# Patient Record
Sex: MALE | Race: WHITE | NOT HISPANIC OR LATINO | Employment: PART TIME | URBAN - METROPOLITAN AREA
[De-identification: names, ages, dates, MRNs, and addresses within clinical notes are randomized per-mention and may not be internally consistent; named-entity substitution may affect disease eponyms.]

---

## 2017-01-04 ENCOUNTER — GENERIC CONVERSION - ENCOUNTER (OUTPATIENT)
Dept: OTHER | Facility: OTHER | Age: 42
End: 2017-01-04

## 2017-01-09 ENCOUNTER — APPOINTMENT (OUTPATIENT)
Dept: PHYSICAL THERAPY | Facility: CLINIC | Age: 42
End: 2017-01-09
Payer: COMMERCIAL

## 2017-01-09 PROCEDURE — 97110 THERAPEUTIC EXERCISES: CPT

## 2017-01-09 PROCEDURE — 97162 PT EVAL MOD COMPLEX 30 MIN: CPT

## 2017-01-10 ENCOUNTER — APPOINTMENT (OUTPATIENT)
Dept: PHYSICAL THERAPY | Facility: CLINIC | Age: 42
End: 2017-01-10
Payer: COMMERCIAL

## 2017-01-11 ENCOUNTER — ALLSCRIPTS OFFICE VISIT (OUTPATIENT)
Dept: OTHER | Facility: OTHER | Age: 42
End: 2017-01-11

## 2017-01-11 ENCOUNTER — APPOINTMENT (OUTPATIENT)
Dept: PHYSICAL THERAPY | Facility: CLINIC | Age: 42
End: 2017-01-11
Payer: COMMERCIAL

## 2017-01-11 DIAGNOSIS — G47.00 INSOMNIA: ICD-10-CM

## 2017-01-11 DIAGNOSIS — N52.9 MALE ERECTILE DYSFUNCTION: ICD-10-CM

## 2017-01-11 PROCEDURE — 97110 THERAPEUTIC EXERCISES: CPT

## 2017-01-11 PROCEDURE — 97140 MANUAL THERAPY 1/> REGIONS: CPT

## 2017-01-13 ENCOUNTER — ALLSCRIPTS OFFICE VISIT (OUTPATIENT)
Dept: OTHER | Facility: OTHER | Age: 42
End: 2017-01-13

## 2017-01-17 ENCOUNTER — APPOINTMENT (OUTPATIENT)
Dept: PHYSICAL THERAPY | Facility: CLINIC | Age: 42
End: 2017-01-17
Payer: COMMERCIAL

## 2017-01-19 ENCOUNTER — APPOINTMENT (OUTPATIENT)
Dept: PHYSICAL THERAPY | Facility: CLINIC | Age: 42
End: 2017-01-19
Payer: COMMERCIAL

## 2017-01-23 ENCOUNTER — APPOINTMENT (OUTPATIENT)
Dept: PHYSICAL THERAPY | Facility: CLINIC | Age: 42
End: 2017-01-23
Payer: COMMERCIAL

## 2017-01-23 ENCOUNTER — GENERIC CONVERSION - ENCOUNTER (OUTPATIENT)
Dept: OTHER | Facility: OTHER | Age: 42
End: 2017-01-23

## 2017-01-23 PROCEDURE — 97140 MANUAL THERAPY 1/> REGIONS: CPT

## 2017-01-23 PROCEDURE — 97110 THERAPEUTIC EXERCISES: CPT

## 2017-02-01 ENCOUNTER — APPOINTMENT (OUTPATIENT)
Dept: PHYSICAL THERAPY | Facility: CLINIC | Age: 42
End: 2017-02-01
Payer: COMMERCIAL

## 2017-02-02 ENCOUNTER — APPOINTMENT (OUTPATIENT)
Dept: PHYSICAL THERAPY | Facility: CLINIC | Age: 42
End: 2017-02-02
Payer: COMMERCIAL

## 2017-02-02 PROCEDURE — 97110 THERAPEUTIC EXERCISES: CPT

## 2017-02-08 ENCOUNTER — APPOINTMENT (OUTPATIENT)
Dept: PHYSICAL THERAPY | Facility: CLINIC | Age: 42
End: 2017-02-08
Payer: COMMERCIAL

## 2017-02-09 ENCOUNTER — APPOINTMENT (OUTPATIENT)
Dept: PHYSICAL THERAPY | Facility: CLINIC | Age: 42
End: 2017-02-09
Payer: COMMERCIAL

## 2017-02-10 ENCOUNTER — GENERIC CONVERSION - ENCOUNTER (OUTPATIENT)
Dept: OTHER | Facility: OTHER | Age: 42
End: 2017-02-10

## 2017-02-14 ENCOUNTER — GENERIC CONVERSION - ENCOUNTER (OUTPATIENT)
Dept: OTHER | Facility: OTHER | Age: 42
End: 2017-02-14

## 2017-02-15 ENCOUNTER — APPOINTMENT (OUTPATIENT)
Dept: PHYSICAL THERAPY | Facility: CLINIC | Age: 42
End: 2017-02-15
Payer: COMMERCIAL

## 2017-02-17 ENCOUNTER — HOSPITAL ENCOUNTER (EMERGENCY)
Facility: HOSPITAL | Age: 42
Discharge: HOME HEALTH | End: 2017-02-17
Attending: EMERGENCY MEDICINE | Admitting: EMERGENCY MEDICINE
Payer: COMMERCIAL

## 2017-02-17 VITALS
WEIGHT: 184.08 LBS | OXYGEN SATURATION: 97 % | DIASTOLIC BLOOD PRESSURE: 75 MMHG | BODY MASS INDEX: 27.18 KG/M2 | RESPIRATION RATE: 18 BRPM | TEMPERATURE: 98 F | SYSTOLIC BLOOD PRESSURE: 117 MMHG | HEART RATE: 77 BPM

## 2017-02-17 DIAGNOSIS — A08.4 VIRAL GASTROENTERITIS: ICD-10-CM

## 2017-02-17 DIAGNOSIS — E86.0 DEHYDRATION: ICD-10-CM

## 2017-02-17 DIAGNOSIS — R11.10 ACUTE VOMITING: Primary | ICD-10-CM

## 2017-02-17 DIAGNOSIS — S37.009A KIDNEY INJURY: ICD-10-CM

## 2017-02-17 LAB
ALBUMIN SERPL BCP-MCNC: 4.6 G/DL (ref 3.5–5)
ALP SERPL-CCNC: 87 U/L (ref 46–116)
ALT SERPL W P-5'-P-CCNC: 65 U/L (ref 12–78)
ANION GAP BLD CALC-SCNC: 19 MMOL/L (ref 4–13)
ANION GAP SERPL CALCULATED.3IONS-SCNC: 13 MMOL/L (ref 4–13)
AST SERPL W P-5'-P-CCNC: 35 U/L (ref 5–45)
BASOPHILS # BLD AUTO: 0.01 THOUSANDS/ΜL (ref 0–0.1)
BASOPHILS NFR BLD AUTO: 0 % (ref 0–1)
BILIRUB SERPL-MCNC: 0.9 MG/DL (ref 0.2–1)
BUN BLD-MCNC: 24 MG/DL (ref 5–25)
BUN SERPL-MCNC: 27 MG/DL (ref 5–25)
CA-I BLD-SCNC: 1.14 MMOL/L (ref 1.12–1.32)
CALCIUM SERPL-MCNC: 9 MG/DL (ref 8.3–10.1)
CHLORIDE BLD-SCNC: 103 MMOL/L (ref 100–108)
CHLORIDE SERPL-SCNC: 98 MMOL/L (ref 100–108)
CK MB SERPL-MCNC: 1.7 NG/ML (ref 0–5)
CK MB SERPL-MCNC: <1 % (ref 0–2.5)
CK SERPL-CCNC: 234 U/L (ref 39–308)
CLARITY, POC: CLEAR
CO2 SERPL-SCNC: 24 MMOL/L (ref 21–32)
COLOR, POC: NORMAL
CREAT BLD-MCNC: 0.9 MG/DL (ref 0.6–1.3)
CREAT SERPL-MCNC: 1.34 MG/DL (ref 0.6–1.3)
EOSINOPHIL # BLD AUTO: 0.05 THOUSAND/ΜL (ref 0–0.61)
EOSINOPHIL NFR BLD AUTO: 1 % (ref 0–6)
ERYTHROCYTE [DISTWIDTH] IN BLOOD BY AUTOMATED COUNT: 14.7 % (ref 11.6–15.1)
EXT BILIRUBIN, UA: NEGATIVE
EXT BLOOD URINE: NEGATIVE
EXT GLUCOSE, UA: NEGATIVE
EXT KETONES: NEGATIVE
EXT NITRITE, UA: NEGATIVE
EXT PH, UA: 6
EXT PROTEIN, UA: NORMAL
EXT SPECIFIC GRAVITY, UA: 1.03
EXT UROBILINOGEN: NEGATIVE
GFR SERPL CREATININE-BSD FRML MDRD: 58.7 ML/MIN/1.73SQ M
GFR SERPL CREATININE-BSD FRML MDRD: >60 ML/MIN/1.73SQ M
GLUCOSE SERPL-MCNC: 105 MG/DL (ref 65–140)
GLUCOSE SERPL-MCNC: 110 MG/DL (ref 65–140)
HCT VFR BLD AUTO: 36.5 % (ref 36.5–49.3)
HCT VFR BLD CALC: 32 % (ref 36.5–49.3)
HGB BLD-MCNC: 11.5 G/DL (ref 12–17)
HGB BLDA-MCNC: 10.9 G/DL (ref 12–17)
LIPASE SERPL-CCNC: 161 U/L (ref 73–393)
LYMPHOCYTES # BLD AUTO: 0.66 THOUSANDS/ΜL (ref 0.6–4.47)
LYMPHOCYTES NFR BLD AUTO: 12 % (ref 14–44)
MCH RBC QN AUTO: 24.1 PG (ref 26.8–34.3)
MCHC RBC AUTO-ENTMCNC: 31.5 G/DL (ref 31.4–37.4)
MCV RBC AUTO: 76 FL (ref 82–98)
MONOCYTES # BLD AUTO: 0.8 THOUSAND/ΜL (ref 0.17–1.22)
MONOCYTES NFR BLD AUTO: 14 % (ref 4–12)
NEUTROPHILS # BLD AUTO: 4.05 THOUSANDS/ΜL (ref 1.85–7.62)
NEUTS SEG NFR BLD AUTO: 73 % (ref 43–75)
PCO2 BLD: 21 MMOL/L (ref 21–32)
PLATELET # BLD AUTO: 367 THOUSANDS/UL (ref 149–390)
PMV BLD AUTO: 9.1 FL (ref 8.9–12.7)
POTASSIUM BLD-SCNC: 3.4 MMOL/L (ref 3.5–5.3)
POTASSIUM SERPL-SCNC: 3.6 MMOL/L (ref 3.5–5.3)
PROT SERPL-MCNC: 8.5 G/DL (ref 6.4–8.2)
RBC # BLD AUTO: 4.78 MILLION/UL (ref 3.88–5.62)
SODIUM BLD-SCNC: 139 MMOL/L (ref 136–145)
SODIUM SERPL-SCNC: 135 MMOL/L (ref 136–145)
SPECIMEN SOURCE: ABNORMAL
WBC # BLD AUTO: 5.57 THOUSAND/UL (ref 4.31–10.16)
WBC # BLD EST: NEGATIVE 10*3/UL

## 2017-02-17 PROCEDURE — 36415 COLL VENOUS BLD VENIPUNCTURE: CPT | Performed by: PHYSICIAN ASSISTANT

## 2017-02-17 PROCEDURE — 82550 ASSAY OF CK (CPK): CPT | Performed by: PHYSICIAN ASSISTANT

## 2017-02-17 PROCEDURE — 85025 COMPLETE CBC W/AUTO DIFF WBC: CPT | Performed by: PHYSICIAN ASSISTANT

## 2017-02-17 PROCEDURE — 96361 HYDRATE IV INFUSION ADD-ON: CPT

## 2017-02-17 PROCEDURE — 99283 EMERGENCY DEPT VISIT LOW MDM: CPT

## 2017-02-17 PROCEDURE — 82553 CREATINE MB FRACTION: CPT | Performed by: PHYSICIAN ASSISTANT

## 2017-02-17 PROCEDURE — 81002 URINALYSIS NONAUTO W/O SCOPE: CPT | Performed by: PHYSICIAN ASSISTANT

## 2017-02-17 PROCEDURE — 85014 HEMATOCRIT: CPT

## 2017-02-17 PROCEDURE — 83690 ASSAY OF LIPASE: CPT | Performed by: PHYSICIAN ASSISTANT

## 2017-02-17 PROCEDURE — 96374 THER/PROPH/DIAG INJ IV PUSH: CPT

## 2017-02-17 PROCEDURE — 80053 COMPREHEN METABOLIC PANEL: CPT | Performed by: PHYSICIAN ASSISTANT

## 2017-02-17 PROCEDURE — 80047 BASIC METABLC PNL IONIZED CA: CPT

## 2017-02-17 RX ORDER — DICYCLOMINE HCL 20 MG
20 TABLET ORAL ONCE
Status: COMPLETED | OUTPATIENT
Start: 2017-02-17 | End: 2017-02-17

## 2017-02-17 RX ORDER — ONDANSETRON 4 MG/1
4 TABLET, ORALLY DISINTEGRATING ORAL EVERY 8 HOURS PRN
Qty: 10 TABLET | Refills: 0 | Status: SHIPPED | OUTPATIENT
Start: 2017-02-17 | End: 2017-12-31

## 2017-02-17 RX ORDER — DICYCLOMINE HYDROCHLORIDE 10 MG/1
10 CAPSULE ORAL
Qty: 20 CAPSULE | Refills: 0 | Status: SHIPPED | OUTPATIENT
Start: 2017-02-17 | End: 2017-02-22

## 2017-02-17 RX ORDER — ONDANSETRON 2 MG/ML
4 INJECTION INTRAMUSCULAR; INTRAVENOUS ONCE
Status: COMPLETED | OUTPATIENT
Start: 2017-02-17 | End: 2017-02-17

## 2017-02-17 RX ADMIN — DICYCLOMINE HYDROCHLORIDE 20 MG: 20 TABLET ORAL at 16:32

## 2017-02-17 RX ADMIN — SODIUM CHLORIDE 1000 ML: 0.9 INJECTION, SOLUTION INTRAVENOUS at 13:32

## 2017-02-17 RX ADMIN — SODIUM CHLORIDE 1000 ML: 0.9 INJECTION, SOLUTION INTRAVENOUS at 14:15

## 2017-02-17 RX ADMIN — ONDANSETRON 4 MG: 2 INJECTION INTRAMUSCULAR; INTRAVENOUS at 13:27

## 2017-02-20 ENCOUNTER — ALLSCRIPTS OFFICE VISIT (OUTPATIENT)
Dept: OTHER | Facility: OTHER | Age: 42
End: 2017-02-20

## 2017-02-20 DIAGNOSIS — E87.6 HYPOKALEMIA: ICD-10-CM

## 2017-02-20 DIAGNOSIS — D64.9 ANEMIA: ICD-10-CM

## 2017-02-24 ENCOUNTER — ALLSCRIPTS OFFICE VISIT (OUTPATIENT)
Dept: OTHER | Facility: OTHER | Age: 42
End: 2017-02-24

## 2017-03-30 ENCOUNTER — ALLSCRIPTS OFFICE VISIT (OUTPATIENT)
Dept: OTHER | Facility: OTHER | Age: 42
End: 2017-03-30

## 2017-03-30 DIAGNOSIS — M65.4 RADIAL STYLOID TENOSYNOVITIS: ICD-10-CM

## 2017-03-30 DIAGNOSIS — M77.01 MEDIAL EPICONDYLITIS OF RIGHT ELBOW: ICD-10-CM

## 2017-03-30 DIAGNOSIS — M77.02 MEDIAL EPICONDYLITIS OF LEFT ELBOW: ICD-10-CM

## 2017-03-30 DIAGNOSIS — D50.9 IRON DEFICIENCY ANEMIA: ICD-10-CM

## 2017-04-04 ENCOUNTER — APPOINTMENT (OUTPATIENT)
Dept: OCCUPATIONAL THERAPY | Facility: CLINIC | Age: 42
End: 2017-04-04
Payer: COMMERCIAL

## 2017-04-04 DIAGNOSIS — M65.4 RADIAL STYLOID TENOSYNOVITIS: ICD-10-CM

## 2017-04-04 DIAGNOSIS — M77.02 MEDIAL EPICONDYLITIS OF LEFT ELBOW: ICD-10-CM

## 2017-04-04 DIAGNOSIS — M77.01 MEDIAL EPICONDYLITIS OF RIGHT ELBOW: ICD-10-CM

## 2017-04-04 PROCEDURE — 97166 OT EVAL MOD COMPLEX 45 MIN: CPT

## 2017-04-05 ENCOUNTER — APPOINTMENT (OUTPATIENT)
Dept: OCCUPATIONAL THERAPY | Facility: CLINIC | Age: 42
End: 2017-04-05
Payer: COMMERCIAL

## 2017-04-05 PROCEDURE — 97140 MANUAL THERAPY 1/> REGIONS: CPT

## 2017-04-10 ENCOUNTER — APPOINTMENT (OUTPATIENT)
Dept: OCCUPATIONAL THERAPY | Facility: CLINIC | Age: 42
End: 2017-04-10
Payer: COMMERCIAL

## 2017-04-10 PROCEDURE — 97140 MANUAL THERAPY 1/> REGIONS: CPT

## 2017-04-12 ENCOUNTER — APPOINTMENT (OUTPATIENT)
Dept: OCCUPATIONAL THERAPY | Facility: CLINIC | Age: 42
End: 2017-04-12
Payer: COMMERCIAL

## 2017-04-12 PROCEDURE — 97140 MANUAL THERAPY 1/> REGIONS: CPT

## 2017-04-19 ENCOUNTER — TRANSCRIBE ORDERS (OUTPATIENT)
Dept: LAB | Facility: CLINIC | Age: 42
End: 2017-04-19

## 2017-04-19 ENCOUNTER — APPOINTMENT (OUTPATIENT)
Dept: OCCUPATIONAL THERAPY | Facility: CLINIC | Age: 42
End: 2017-04-19
Payer: COMMERCIAL

## 2017-04-19 ENCOUNTER — APPOINTMENT (OUTPATIENT)
Dept: LAB | Facility: CLINIC | Age: 42
End: 2017-04-19
Payer: COMMERCIAL

## 2017-04-19 DIAGNOSIS — N52.9 IMPOTENCE OF ORGANIC ORIGIN: Primary | ICD-10-CM

## 2017-04-19 DIAGNOSIS — G47.00 INSOMNIA: ICD-10-CM

## 2017-04-19 DIAGNOSIS — D64.9 ANEMIA: ICD-10-CM

## 2017-04-19 DIAGNOSIS — E87.6 HYPOKALEMIA: ICD-10-CM

## 2017-04-19 DIAGNOSIS — N52.9 MALE ERECTILE DYSFUNCTION: ICD-10-CM

## 2017-04-19 DIAGNOSIS — D64.9 ANEMIA, UNSPECIFIED: ICD-10-CM

## 2017-04-19 PROCEDURE — 97140 MANUAL THERAPY 1/> REGIONS: CPT

## 2017-04-20 ENCOUNTER — APPOINTMENT (OUTPATIENT)
Dept: OCCUPATIONAL THERAPY | Facility: CLINIC | Age: 42
End: 2017-04-20
Payer: COMMERCIAL

## 2017-04-20 ENCOUNTER — GENERIC CONVERSION - ENCOUNTER (OUTPATIENT)
Dept: OTHER | Facility: OTHER | Age: 42
End: 2017-04-20

## 2017-04-20 PROCEDURE — 97140 MANUAL THERAPY 1/> REGIONS: CPT

## 2017-04-22 ENCOUNTER — APPOINTMENT (OUTPATIENT)
Dept: LAB | Facility: CLINIC | Age: 42
End: 2017-04-22
Payer: COMMERCIAL

## 2017-04-22 DIAGNOSIS — D64.9 ANEMIA, UNSPECIFIED: ICD-10-CM

## 2017-04-22 DIAGNOSIS — N52.9 IMPOTENCE OF ORGANIC ORIGIN: ICD-10-CM

## 2017-04-22 LAB
ANION GAP SERPL CALCULATED.3IONS-SCNC: 6 MMOL/L (ref 4–13)
BASOPHILS # BLD AUTO: 0.03 THOUSANDS/ΜL (ref 0–0.1)
BASOPHILS NFR BLD AUTO: 1 % (ref 0–1)
BUN SERPL-MCNC: 20 MG/DL (ref 5–25)
CALCIUM SERPL-MCNC: 8.9 MG/DL (ref 8.3–10.1)
CHLORIDE SERPL-SCNC: 102 MMOL/L (ref 100–108)
CHOLEST SERPL-MCNC: 145 MG/DL (ref 50–200)
CO2 SERPL-SCNC: 29 MMOL/L (ref 21–32)
CREAT SERPL-MCNC: 0.81 MG/DL (ref 0.6–1.3)
EOSINOPHIL # BLD AUTO: 0.16 THOUSAND/ΜL (ref 0–0.61)
EOSINOPHIL NFR BLD AUTO: 3 % (ref 0–6)
ERYTHROCYTE [DISTWIDTH] IN BLOOD BY AUTOMATED COUNT: 14.9 % (ref 11.6–15.1)
FERRITIN SERPL-MCNC: 4 NG/ML (ref 8–388)
GFR SERPL CREATININE-BSD FRML MDRD: >60 ML/MIN/1.73SQ M
GLUCOSE P FAST SERPL-MCNC: 93 MG/DL (ref 65–99)
HCT VFR BLD AUTO: 34.4 % (ref 36.5–49.3)
HDLC SERPL-MCNC: 40 MG/DL (ref 40–60)
HGB BLD-MCNC: 10.5 G/DL (ref 12–17)
IRON SERPL-MCNC: 37 UG/DL (ref 65–175)
LDLC SERPL CALC-MCNC: 62 MG/DL (ref 0–100)
LYMPHOCYTES # BLD AUTO: 1.54 THOUSANDS/ΜL (ref 0.6–4.47)
LYMPHOCYTES NFR BLD AUTO: 28 % (ref 14–44)
MAGNESIUM SERPL-MCNC: 1.9 MG/DL (ref 1.6–2.6)
MCH RBC QN AUTO: 21.9 PG (ref 26.8–34.3)
MCHC RBC AUTO-ENTMCNC: 30.5 G/DL (ref 31.4–37.4)
MCV RBC AUTO: 72 FL (ref 82–98)
MONOCYTES # BLD AUTO: 0.73 THOUSAND/ΜL (ref 0.17–1.22)
MONOCYTES NFR BLD AUTO: 13 % (ref 4–12)
NEUTROPHILS # BLD AUTO: 3.1 THOUSANDS/ΜL (ref 1.85–7.62)
NEUTS SEG NFR BLD AUTO: 55 % (ref 43–75)
PLATELET # BLD AUTO: 423 THOUSANDS/UL (ref 149–390)
PMV BLD AUTO: 9.4 FL (ref 8.9–12.7)
POTASSIUM SERPL-SCNC: 4.8 MMOL/L (ref 3.5–5.3)
RBC # BLD AUTO: 4.8 MILLION/UL (ref 3.88–5.62)
SODIUM SERPL-SCNC: 137 MMOL/L (ref 136–145)
TIBC SERPL-MCNC: 540 UG/DL (ref 250–450)
TRIGL SERPL-MCNC: 215 MG/DL
TSH SERPL DL<=0.05 MIU/L-ACNC: 0.55 UIU/ML (ref 0.36–3.74)
WBC # BLD AUTO: 5.56 THOUSAND/UL (ref 4.31–10.16)

## 2017-04-22 PROCEDURE — 82728 ASSAY OF FERRITIN: CPT

## 2017-04-22 PROCEDURE — 80048 BASIC METABOLIC PNL TOTAL CA: CPT

## 2017-04-22 PROCEDURE — 83540 ASSAY OF IRON: CPT

## 2017-04-22 PROCEDURE — 83735 ASSAY OF MAGNESIUM: CPT

## 2017-04-22 PROCEDURE — 83550 IRON BINDING TEST: CPT

## 2017-04-22 PROCEDURE — 84443 ASSAY THYROID STIM HORMONE: CPT

## 2017-04-22 PROCEDURE — 85025 COMPLETE CBC W/AUTO DIFF WBC: CPT

## 2017-04-22 PROCEDURE — 80061 LIPID PANEL: CPT

## 2017-04-22 PROCEDURE — 36415 COLL VENOUS BLD VENIPUNCTURE: CPT

## 2017-04-24 ENCOUNTER — APPOINTMENT (OUTPATIENT)
Dept: OCCUPATIONAL THERAPY | Facility: CLINIC | Age: 42
End: 2017-04-24
Payer: COMMERCIAL

## 2017-04-24 PROCEDURE — 97140 MANUAL THERAPY 1/> REGIONS: CPT

## 2017-04-26 ENCOUNTER — ALLSCRIPTS OFFICE VISIT (OUTPATIENT)
Dept: OTHER | Facility: OTHER | Age: 42
End: 2017-04-26

## 2017-04-26 ENCOUNTER — GENERIC CONVERSION - ENCOUNTER (OUTPATIENT)
Dept: OTHER | Facility: OTHER | Age: 42
End: 2017-04-26

## 2017-04-28 ENCOUNTER — APPOINTMENT (OUTPATIENT)
Dept: OCCUPATIONAL THERAPY | Facility: CLINIC | Age: 42
End: 2017-04-28
Payer: COMMERCIAL

## 2017-04-28 PROCEDURE — 97140 MANUAL THERAPY 1/> REGIONS: CPT

## 2017-05-02 ENCOUNTER — APPOINTMENT (OUTPATIENT)
Dept: OCCUPATIONAL THERAPY | Facility: CLINIC | Age: 42
End: 2017-05-02
Payer: COMMERCIAL

## 2017-05-02 PROCEDURE — 97140 MANUAL THERAPY 1/> REGIONS: CPT

## 2017-05-05 ENCOUNTER — APPOINTMENT (OUTPATIENT)
Dept: OCCUPATIONAL THERAPY | Facility: CLINIC | Age: 42
End: 2017-05-05
Payer: COMMERCIAL

## 2017-05-05 ENCOUNTER — GENERIC CONVERSION - ENCOUNTER (OUTPATIENT)
Dept: OTHER | Facility: OTHER | Age: 42
End: 2017-05-05

## 2017-05-05 PROCEDURE — 97140 MANUAL THERAPY 1/> REGIONS: CPT

## 2017-05-09 ENCOUNTER — APPOINTMENT (OUTPATIENT)
Dept: OCCUPATIONAL THERAPY | Facility: CLINIC | Age: 42
End: 2017-05-09
Payer: COMMERCIAL

## 2017-05-09 PROCEDURE — 97140 MANUAL THERAPY 1/> REGIONS: CPT

## 2017-05-11 ENCOUNTER — ALLSCRIPTS OFFICE VISIT (OUTPATIENT)
Dept: OTHER | Facility: OTHER | Age: 42
End: 2017-05-11

## 2017-06-12 ENCOUNTER — GENERIC CONVERSION - ENCOUNTER (OUTPATIENT)
Dept: OTHER | Facility: OTHER | Age: 42
End: 2017-06-12

## 2017-06-12 ENCOUNTER — APPOINTMENT (OUTPATIENT)
Dept: LAB | Facility: CLINIC | Age: 42
End: 2017-06-12
Payer: COMMERCIAL

## 2017-06-12 ENCOUNTER — TRANSCRIBE ORDERS (OUTPATIENT)
Dept: LAB | Facility: CLINIC | Age: 42
End: 2017-06-12

## 2017-06-12 DIAGNOSIS — D50.9 IRON DEFICIENCY ANEMIA: ICD-10-CM

## 2017-06-12 LAB
BASOPHILS # BLD AUTO: 0.02 THOUSANDS/ΜL (ref 0–0.1)
BASOPHILS NFR BLD AUTO: 0 % (ref 0–1)
EOSINOPHIL # BLD AUTO: 0.21 THOUSAND/ΜL (ref 0–0.61)
EOSINOPHIL NFR BLD AUTO: 3 % (ref 0–6)
ERYTHROCYTE [DISTWIDTH] IN BLOOD BY AUTOMATED COUNT: 20.1 % (ref 11.6–15.1)
FERRITIN SERPL-MCNC: 8 NG/ML (ref 8–388)
HCT VFR BLD AUTO: 37 % (ref 36.5–49.3)
HGB BLD-MCNC: 11.8 G/DL (ref 12–17)
IRON SATN MFR SERPL: 10 %
IRON SERPL-MCNC: 45 UG/DL (ref 65–175)
LYMPHOCYTES # BLD AUTO: 1.67 THOUSANDS/ΜL (ref 0.6–4.47)
LYMPHOCYTES NFR BLD AUTO: 22 % (ref 14–44)
MCH RBC QN AUTO: 23.2 PG (ref 26.8–34.3)
MCHC RBC AUTO-ENTMCNC: 31.9 G/DL (ref 31.4–37.4)
MCV RBC AUTO: 73 FL (ref 82–98)
MONOCYTES # BLD AUTO: 0.72 THOUSAND/ΜL (ref 0.17–1.22)
MONOCYTES NFR BLD AUTO: 10 % (ref 4–12)
NEUTROPHILS # BLD AUTO: 4.99 THOUSANDS/ΜL (ref 1.85–7.62)
NEUTS SEG NFR BLD AUTO: 65 % (ref 43–75)
PLATELET # BLD AUTO: 369 THOUSANDS/UL (ref 149–390)
PMV BLD AUTO: 9.6 FL (ref 8.9–12.7)
RBC # BLD AUTO: 5.08 MILLION/UL (ref 3.88–5.62)
TIBC SERPL-MCNC: 467 UG/DL (ref 250–450)
WBC # BLD AUTO: 7.61 THOUSAND/UL (ref 4.31–10.16)

## 2017-06-12 PROCEDURE — 83540 ASSAY OF IRON: CPT

## 2017-06-12 PROCEDURE — 36415 COLL VENOUS BLD VENIPUNCTURE: CPT

## 2017-06-12 PROCEDURE — 82728 ASSAY OF FERRITIN: CPT

## 2017-06-12 PROCEDURE — 85025 COMPLETE CBC W/AUTO DIFF WBC: CPT

## 2017-06-12 PROCEDURE — 83550 IRON BINDING TEST: CPT

## 2017-06-20 ENCOUNTER — ALLSCRIPTS OFFICE VISIT (OUTPATIENT)
Dept: OTHER | Facility: OTHER | Age: 42
End: 2017-06-20

## 2017-07-05 ENCOUNTER — ALLSCRIPTS OFFICE VISIT (OUTPATIENT)
Dept: OTHER | Facility: OTHER | Age: 42
End: 2017-07-05

## 2017-07-17 ENCOUNTER — ALLSCRIPTS OFFICE VISIT (OUTPATIENT)
Dept: OTHER | Facility: OTHER | Age: 42
End: 2017-07-17

## 2017-08-17 ENCOUNTER — GENERIC CONVERSION - ENCOUNTER (OUTPATIENT)
Dept: OTHER | Facility: OTHER | Age: 42
End: 2017-08-17

## 2017-08-17 ENCOUNTER — APPOINTMENT (OUTPATIENT)
Dept: LAB | Facility: CLINIC | Age: 42
End: 2017-08-17
Payer: COMMERCIAL

## 2017-08-17 ENCOUNTER — TRANSCRIBE ORDERS (OUTPATIENT)
Dept: LAB | Facility: CLINIC | Age: 42
End: 2017-08-17

## 2017-08-17 DIAGNOSIS — D50.9 IRON DEFICIENCY ANEMIA: ICD-10-CM

## 2017-08-17 LAB
BASOPHILS # BLD AUTO: 0.02 THOUSANDS/ΜL (ref 0–0.1)
BASOPHILS NFR BLD AUTO: 0 % (ref 0–1)
EOSINOPHIL # BLD AUTO: 0.07 THOUSAND/ΜL (ref 0–0.61)
EOSINOPHIL NFR BLD AUTO: 1 % (ref 0–6)
ERYTHROCYTE [DISTWIDTH] IN BLOOD BY AUTOMATED COUNT: 17.2 % (ref 11.6–15.1)
FERRITIN SERPL-MCNC: 25 NG/ML (ref 8–388)
HCT VFR BLD AUTO: 42.4 % (ref 36.5–49.3)
HGB BLD-MCNC: 14.3 G/DL (ref 12–17)
IRON SATN MFR SERPL: 23 %
IRON SERPL-MCNC: 99 UG/DL (ref 65–175)
LYMPHOCYTES # BLD AUTO: 1.73 THOUSANDS/ΜL (ref 0.6–4.47)
LYMPHOCYTES NFR BLD AUTO: 25 % (ref 14–44)
MCH RBC QN AUTO: 25.8 PG (ref 26.8–34.3)
MCHC RBC AUTO-ENTMCNC: 33.7 G/DL (ref 31.4–37.4)
MCV RBC AUTO: 77 FL (ref 82–98)
MONOCYTES # BLD AUTO: 0.57 THOUSAND/ΜL (ref 0.17–1.22)
MONOCYTES NFR BLD AUTO: 8 % (ref 4–12)
NEUTROPHILS # BLD AUTO: 4.51 THOUSANDS/ΜL (ref 1.85–7.62)
NEUTS SEG NFR BLD AUTO: 66 % (ref 43–75)
PLATELET # BLD AUTO: 326 THOUSANDS/UL (ref 149–390)
PMV BLD AUTO: 8.9 FL (ref 8.9–12.7)
RBC # BLD AUTO: 5.54 MILLION/UL (ref 3.88–5.62)
TIBC SERPL-MCNC: 438 UG/DL (ref 250–450)
WBC # BLD AUTO: 6.9 THOUSAND/UL (ref 4.31–10.16)

## 2017-08-17 PROCEDURE — 83540 ASSAY OF IRON: CPT

## 2017-08-17 PROCEDURE — 83550 IRON BINDING TEST: CPT

## 2017-08-17 PROCEDURE — 82728 ASSAY OF FERRITIN: CPT

## 2017-08-17 PROCEDURE — 85025 COMPLETE CBC W/AUTO DIFF WBC: CPT

## 2017-08-17 PROCEDURE — 36415 COLL VENOUS BLD VENIPUNCTURE: CPT

## 2017-09-18 ENCOUNTER — ALLSCRIPTS OFFICE VISIT (OUTPATIENT)
Dept: OTHER | Facility: OTHER | Age: 42
End: 2017-09-18

## 2017-10-24 ENCOUNTER — GENERIC CONVERSION - ENCOUNTER (OUTPATIENT)
Dept: OTHER | Facility: OTHER | Age: 42
End: 2017-10-24

## 2017-11-01 ENCOUNTER — TRANSCRIBE ORDERS (OUTPATIENT)
Dept: LAB | Facility: CLINIC | Age: 42
End: 2017-11-01

## 2017-11-01 ENCOUNTER — APPOINTMENT (OUTPATIENT)
Dept: LAB | Facility: CLINIC | Age: 42
End: 2017-11-01
Payer: COMMERCIAL

## 2017-11-01 ENCOUNTER — GENERIC CONVERSION - ENCOUNTER (OUTPATIENT)
Dept: OTHER | Facility: OTHER | Age: 42
End: 2017-11-01

## 2017-11-01 DIAGNOSIS — D50.9 IRON DEFICIENCY ANEMIA: ICD-10-CM

## 2017-11-01 LAB
BASOPHILS # BLD AUTO: 0.01 THOUSANDS/ΜL (ref 0–0.1)
BASOPHILS NFR BLD AUTO: 0 % (ref 0–1)
EOSINOPHIL # BLD AUTO: 0.1 THOUSAND/ΜL (ref 0–0.61)
EOSINOPHIL NFR BLD AUTO: 1 % (ref 0–6)
ERYTHROCYTE [DISTWIDTH] IN BLOOD BY AUTOMATED COUNT: 13.9 % (ref 11.6–15.1)
FERRITIN SERPL-MCNC: 22 NG/ML (ref 8–388)
HCT VFR BLD AUTO: 42.4 % (ref 36.5–49.3)
HGB BLD-MCNC: 14.5 G/DL (ref 12–17)
IRON SATN MFR SERPL: 25 %
IRON SERPL-MCNC: 107 UG/DL (ref 65–175)
LYMPHOCYTES # BLD AUTO: 1.71 THOUSANDS/ΜL (ref 0.6–4.47)
LYMPHOCYTES NFR BLD AUTO: 24 % (ref 14–44)
MCH RBC QN AUTO: 28 PG (ref 26.8–34.3)
MCHC RBC AUTO-ENTMCNC: 34.2 G/DL (ref 31.4–37.4)
MCV RBC AUTO: 82 FL (ref 82–98)
MONOCYTES # BLD AUTO: 0.61 THOUSAND/ΜL (ref 0.17–1.22)
MONOCYTES NFR BLD AUTO: 8 % (ref 4–12)
NEUTROPHILS # BLD AUTO: 4.79 THOUSANDS/ΜL (ref 1.85–7.62)
NEUTS SEG NFR BLD AUTO: 67 % (ref 43–75)
PLATELET # BLD AUTO: 339 THOUSANDS/UL (ref 149–390)
PMV BLD AUTO: 9.3 FL (ref 8.9–12.7)
RBC # BLD AUTO: 5.17 MILLION/UL (ref 3.88–5.62)
TIBC SERPL-MCNC: 426 UG/DL (ref 250–450)
WBC # BLD AUTO: 7.22 THOUSAND/UL (ref 4.31–10.16)

## 2017-11-01 PROCEDURE — 83540 ASSAY OF IRON: CPT

## 2017-11-01 PROCEDURE — 85025 COMPLETE CBC W/AUTO DIFF WBC: CPT

## 2017-11-01 PROCEDURE — 82728 ASSAY OF FERRITIN: CPT

## 2017-11-01 PROCEDURE — 83550 IRON BINDING TEST: CPT

## 2017-11-01 PROCEDURE — 36415 COLL VENOUS BLD VENIPUNCTURE: CPT

## 2017-11-06 ENCOUNTER — ALLSCRIPTS OFFICE VISIT (OUTPATIENT)
Dept: OTHER | Facility: OTHER | Age: 42
End: 2017-11-06

## 2017-11-07 NOTE — CONSULTS
Assessment  1  Restless legs syndrome (333 94) (G25 81)   2  Hypnic jerks (307 49) (F51 8)    Plan  Hypnic jerks, Restless legs syndrome    · Follow-up PRN Evaluation and Treatment  Follow-up  Status: Complete  Done:  [id number]   Ordered; For: Hypnic jerks, Restless legs syndrome; Ordered By: Guido Leonardo Performed:  Due: [id number]  Restless legs syndrome    · Pramipexole Dihydrochloride 0 125 MG Oral Tablet; 1 po qhs, if no improvement  after 4 days can increase to 2 tabs po qhs   Rx By: Guido Leonardo; Dispense: 0 Days ; #:60 Tablet; Refill: 3;For: Restless legs syndrome; DUSTIN = N; Verified Transmission to [facility][id number] Last Updated By: System, [employer]; 11/6/2017 2:47:40 PM    Discussion/Summary  Discussion Summary:   Abnormal legs sensation and discomfort along with leg movements, an urge to move this legs and improvement with movement consistent with restless legs  He also has movement a night so perhaps PLMS but this is not bother some  We spoke about the associated between iron deficiency and RLS  He has supplemented but there have been studies showing keeping higher ferritin levels closer to 100 helping with symptoms  He can try to do this with diet if he no longer wishes to take pills  We spoke of the option of treating the symptoms with a medication versus staying on TylenolPM which helps him sleep  Will try pramipexole 0 125mg 1-2 hours before bed  If no improvement increase to 2 tabs  Will follow up with PCP  If worsens further workup with sleep study can be performed  Counseling Documentation With Imm: The patient was counseled regarding patient and family education,-- impressions,-- risks and benefits of treatment options  Medication SE Review and Pt Understands Tx: Possible side effects of new medications were reviewed with the patient/guardian today  The treatment plan was reviewed with the patient/guardian   The patient/guardian understands and agrees with the treatment plan      Chief Complaint  Chief Complaint Free Text Note Form: Patient present for a neurological consult for RLS  History of Present Illness  HPI: Brandon Leung is 43year old referred for neurological evaluation of restless legs syndrome  He developed shaking of his legs at night and while sleeping  It was bothering him but Tylenol PM has helped  This all started year ago but did not bother him  When exhausted and laying in bed his legs will move and feel uncomfortable  It can keep him up  Once he is asleep he is fine but his significant other tells him he kicks in his sleep as well  He thought it was secondary to vodka consumption but he has not drank in months  Kicks in sleep do not bother him as it does not wake him up  When symptoms are happening while awake moving his legs may help  He just lays there and lets it go until he falls asleep  He denies any numbness or weakness in his legs  No changes in gait  was iron deficient and has supplemented with iron  Last levels normalized  Ferritin 22  Review of Systems  Neurological ROS:   Constitutional: no fever, no chills, no recent weight gain, no recent weight loss, no complaints of feeling tired, no changes in appetite  HEENT:  no sinus problems, not feeling congested, no blurred vision, no dryness of the eyes, no eye pain, no hearing loss, no tinnitus, no mouth sores, no sore throat, no hoarseness, no dysphagia, no masses, no bleeding  Cardiovascular:  no chest pain or pressure, no palpitations present, the heart rate was not rapid or irregular, no swelling in the arms or legs, no poor circulation  Respiratory:  no unusual or persistant cough, no shortness of breath with or without exertion  Gastrointestinal:  no nausea, no vomiting, no diarrhea, no abdominal pain, no changes in bowel habits, no melena, no loss of bowel control     Genitourinary:  no incontinence, no feelings of urinary urgency, no increase in frequency, no urinary hesitancy, no dysuria, no hematuria  Musculoskeletal:  no arthralgias, no myalgias, no immobility or loss of function, no head/neck/back pain, no pain while walking  Integumentary  no masses, no rash, no skin lesions, no livedo reticularis  Psychiatric:  no anxiety, no depression, no mood swings, no psychiatric hospitalizations, no sleep problems  Endocrine  no unusual weight loss or gain, no excessive urination, no excessive thirst, no hair loss or gain, no hot or cold intolerance, no menstrual period change or irregularity, no loss of sexual ability or drive, no erection difficulty, no nipple discharge  Neurological General:  no headache, no nausea or vomiting, no lightheadedness, no convulsions, no blackouts, no syncope, no trauma, no photopsia, no increased sleepiness, no trouble falling asleep, no snoring, no awakening at night  Neurological Mental Status:  no confusion, no mood swings, no alteration or loss of consciousness, no difficulty expressing/understanding speech, no memory problems  Neurological Cranial Nerves:  no blurry or double vision, no loss of vision, no face drooping, no facial numbness or weakness, no taste or smell loss/changes, no hearing loss or ringing, no vertigo or dizziness, no dysphagia, no slurred speech  Neurological Motor findings include:  no tremor, no twitching, no cramping(pre/post exercise), no atrophy  Neurological Sensory:  no numbness, no pain, no tingling, does not fall when eyes closed or taking a shower  Neurological Gait:  no difficulty walking, not falling to one side, no sensation of being pushed, has not had falls  Active Problems  1  Acid reflux (530 81) (K21 9)   2  Anxiety (300 00) (F41 9)   3  Arthralgia, unspecified joint (719 40) (M25 50)   4  Closed fracture of proximal phalanx of left hand (816 01) (S60 693U)   5  De Quervain's tenosynovitis, left (727 04) (M65 4)   6  Encounter for smoking cessation counseling (V65 42,305 1) (Z71 6,Z72 0)   7   Erectile dysfunction (607 84) (N52 9)   8  External hemorrhoids (455 3) (K64 4)   9  Hypnic jerks (307 49) (F51 8)   10  Hypokalemia (276 8) (E87 6)   11  Insomnia (780 52) (G47 00)   12  Iron deficiency anemia (280 9) (D50 9)   13  Lateral epicondylitis of both elbows (726 32) (M77 11,M77 12)   14  Lower back pain (724 2) (M54 5)   15  Medial epicondylitis of left elbow (726 31) (M77 02)   16  Medial epicondylitis of right elbow (726 31) (M77 01)   17  Restless legs syndrome (333 94) (G25 81)   18  Skin tag (701 9) (L91 8)   19  Stress at home (V61 9) (F43 9)   20  Tendinitis of left rotator cuff (726 10) (M75 82)   21  Tendinitis of right rotator cuff (726 10) (M75 81)    Past Medical History  1  History of Acute upper respiratory infection (465 9) (J06 9)   2  History of Umbilical hernia (346 0) (K42 9)  Active Problems And Past Medical History Reviewed: The active problems and past medical history were reviewed and updated today  Surgical History  1  History of Closed Treatment Of Trimalleolar Ankle Fracture   2  History of Hernia Repair   3  History of Umbilical Hernia Repair  Surgical History Reviewed: The surgical history was reviewed and updated today  Family History  Mother    1  Denied: Family history of Alcoholism and drug addiction in family   2  Denied: Family history of Anxiety and depression   3  Denied: Family history of colon cancer   4  Denied: Family history of colonic polyps   5  Denied: Family history of liver disease  Father    10  Denied: Family history of Alcoholism and drug addiction in family   7  Denied: Family history of Anxiety and depression   8  Denied: Family history of colon cancer   9  Denied: Family history of colonic polyps   10  Denied: Family history of liver disease  Child    6  Denied: Family history of Alcoholism and drug addiction in family   15  Denied: Family history of Anxiety and depression  Sibling    15   Denied: Family history of Alcoholism and drug addiction in family   15  Denied: Family history of Anxiety and depression  Family History Reviewed: The family history was reviewed and updated today  Social History   · Alcohol use (V49 89) (Z78 9)   · Current some day smoker (305 1) (F17 200)   · Currently working   · History of marijuana use (305 23) (B45 655)   · Single   · Stress at home (V61 9) (F43 9)  Social History Reviewed: The social history was reviewed and updated today  Current Meds   1  Ibuprofen 800 MG Oral Tablet; TAKE 1 TABLET 3 TIMES DAILY AS NEEDED; Therapy: (Recorded:06Nov2017) to Recorded   2  RABEprazole Sodium 20 MG Oral Tablet Delayed Release; TAKE 1 TABLET DAILY AS   NEEDED FOR HEARTBURN;   Therapy: 11DJN8913 to (Evaluate:17Jan2018)  Requested for: 15Dsl7129; Last   Rx:[id number] Ordered   3  Tylenol PM Extra Strength 500-25 MG Oral Tablet; TAKE 1 TABLET AT BEDTIME AS   NEEDED FOR JOINT PAINS; Therapy: 27ZWV8259 to (Evaluate:17Nov2017)  Requested for: 18Sep2017; Last   Rx:[id number] Ordered  Medication List Reviewed: The medication list was reviewed and updated today  Allergies  1  No Known Drug Allergies    Vitals  Signs   Recorded: 63QBH2681 02:08PM   Heart Rate: 84  Systolic: 503  Diastolic: 68  Height: 5 ft 9 in  Weight: 168 lb   BMI Calculated: 24 81  BSA Calculated: 1 92    Physical Exam    Constitutional   General appearance: No acute distress, well appearing and well nourished  Eyes   Ophthalmoscopic examination: Vision is grossly normal  Gross visual field testing by confrontation shows no abnormalities  EOMI in both eyes  Conjunctivae clear  Eyelids normal palpebral fissures equal  Orbits exhibit normal position  No discharge from the eyes  PERRL  Musculoskeletal   Gait and station: Normal gait, stance and balance  Muscle strength: Normal strength throughout  Muscle tone: No atrophy, abnormal movements, flaccidity, cogwheeling or spasticity      Neurologic   Orientation to person, place, and time: Normal  Recent and remote memory: Demonstrates normal memory  Attention span and concentration: Normal thought process and attention span  Language: Names objects, able to repeat phrases and speaks spontaneously  Fund of knowledge: Normal vocabulary with appropriate knowledge of current events and past history      2nd cranial nerve: Normal     3rd, 4th, and 6th cranial nerves: Normal     5th cranial nerve: Normal     7th cranial nerve: Normal     8th cranial nerve: Normal     9th cranial nerve: Normal     11th cranial nerve: Normal     12th cranial nerve: Normal     Sensation: Normal     Reflexes: Normal     Coordination: Normal        Signatures   Electronically signed by : Valeria Pickering MD; Nov 6 2017  3:03PM EST                       (Author)

## 2017-12-19 ENCOUNTER — ALLSCRIPTS OFFICE VISIT (OUTPATIENT)
Dept: OTHER | Facility: OTHER | Age: 42
End: 2017-12-19

## 2017-12-20 NOTE — PROGRESS NOTES
Assessment  1  Left wrist pain (719 43) (M25 532)    Plan  De Quervain's tenosynovitis, left    · PredniSONE 10 MG (48) Oral Tablet Therapy Pack; use as directed    Discussion/Summary    Left wrist pain, likely overuse syndrome  Prescribed prednisone taper  Continue ibuprofen 800 mg as needed  Follow-up as needed if pain persists or worsens  Chief Complaint  1  Shoulder Pain    History of Present Illness  HPI: Patient returns today with worsening pain in his left wrist  He attributes the pain to increased use during the holiday season at [facility]  He has been taking ibuprofen 800 mg  States that he has not been drinking his usual amount of alcohol which has made the pain harder to deal with  He wears an elastic wrap over his wrist which helps slightly  Active Problems  1  Acid reflux (530 81) (K21 9)   2  Anxiety (300 00) (F41 9)   3  Arthralgia, unspecified joint (719 40) (M25 50)   4  Closed fracture of proximal phalanx of left hand (816 01) (S62 235F)   5  Encounter for smoking cessation counseling (V65 42,305 1) (Z71 6,Z72 0)   6  Erectile dysfunction (607 84) (N52 9)   7  External hemorrhoids (455 3) (K64 4)   8  Hypnic jerks (307 49) (F51 8)   9  Hypokalemia (276 8) (E87 6)   10  Insomnia (780 52) (G47 00)   11  Iron deficiency anemia (280 9) (D50 9)   12  Lateral epicondylitis of both elbows (726 32) (M77 11,M77 12)   13  Left wrist pain (719 43) (M25 532)   14  Lower back pain (724 2) (M54 5)   15  Medial epicondylitis of left elbow (726 31) (M77 02)   16  Medial epicondylitis of right elbow (726 31) (M77 01)   17  Restless legs syndrome (333 94) (G25 81)   18  Skin tag (701 9) (L91 8)   19  Stress at home (V61 9) (F43 9)   20  Tendinitis of left rotator cuff (726 10) (M75 82)   21  Tendinitis of right rotator cuff (726 10) (M75 81)    Current Meds   1  Ibuprofen 800 MG Oral Tablet; TAKE 1 TABLET 3 TIMES DAILY AS NEEDED  Requested for: 33MWW3701; Last Rx:[id number] Ordered   2   Pramipexole Dihydrochloride 0 125 MG Oral Tablet; 1 po qhs, if no improvement after 4 days can increase to 2 tabs po qhs; Therapy: 07DJS1106 to (Last Rx:06Nov2017)  Requested for: 42IYU8336 Ordered   3  RABEprazole Sodium 20 MG Oral Tablet Delayed Release; TAKE 1 TABLET DAILY AS NEEDED FOR HEARTBURN; Therapy: 48KNQ6626 to (Evaluate:17Jan2018)  Requested for: 11Wne8253; Last Rx:94Byq0043 Ordered   4  Tylenol PM Extra Strength 500-25 MG Oral Tablet; TAKE 1 TABLET AT BEDTIME AS NEEDED FOR JOINT PAINS; Therapy: 05JMK1503 to (Evaluate:17Nov2017)  Requested for: 72Qor2174; Last Rx:[id number] Ordered    Allergies  1  No Known Drug Allergies    Vitals  Signs   Heart Rate: 86  Systolic: 473  Diastolic: 81  Height: 5 ft 9 in  Weight: 164 lb   BMI Calculated: 24 22  BSA Calculated: 1 9    Physical Exam  Left wrist:  No soft tissue swelling  Tenderness to palpation diffusely over the dorsal carpus  Full range of motion of the wrist without DRUJ instability  Carpal tunnel compression test is negative  Skin is intact  2+ radial pulse  No gross sensory deficits  Mood and affect are appropriate        Signatures   Electronically signed by : FARRUKH Easley ; Dec 19 2017 12:22PM EST                       (Author)

## 2017-12-31 ENCOUNTER — HOSPITAL ENCOUNTER (EMERGENCY)
Facility: HOSPITAL | Age: 42
Discharge: HOME | End: 2017-12-31
Attending: EMERGENCY MEDICINE | Admitting: EMERGENCY MEDICINE
Payer: COMMERCIAL

## 2017-12-31 ENCOUNTER — APPOINTMENT (EMERGENCY)
Dept: CT IMAGING | Facility: HOSPITAL | Age: 42
End: 2017-12-31
Payer: COMMERCIAL

## 2017-12-31 VITALS
BODY MASS INDEX: 24.04 KG/M2 | RESPIRATION RATE: 16 BRPM | SYSTOLIC BLOOD PRESSURE: 128 MMHG | HEART RATE: 70 BPM | WEIGHT: 162.8 LBS | OXYGEN SATURATION: 100 % | TEMPERATURE: 97.8 F | DIASTOLIC BLOOD PRESSURE: 65 MMHG

## 2017-12-31 DIAGNOSIS — K52.9 GASTROENTERITIS: Primary | ICD-10-CM

## 2017-12-31 LAB
ALBUMIN SERPL BCP-MCNC: 3.8 G/DL (ref 3.5–5)
ALP SERPL-CCNC: 71 U/L (ref 46–116)
ALT SERPL W P-5'-P-CCNC: 61 U/L (ref 12–78)
ANION GAP SERPL CALCULATED.3IONS-SCNC: 9 MMOL/L (ref 4–13)
AST SERPL W P-5'-P-CCNC: 56 U/L (ref 5–45)
BASOPHILS # BLD AUTO: 0.01 THOUSANDS/ΜL (ref 0–0.1)
BASOPHILS NFR BLD AUTO: 0 % (ref 0–1)
BILIRUB SERPL-MCNC: 0.7 MG/DL (ref 0.2–1)
BUN SERPL-MCNC: 15 MG/DL (ref 5–25)
CALCIUM SERPL-MCNC: 9 MG/DL (ref 8.3–10.1)
CHLORIDE SERPL-SCNC: 97 MMOL/L (ref 100–108)
CO2 SERPL-SCNC: 27 MMOL/L (ref 21–32)
CREAT SERPL-MCNC: 0.8 MG/DL (ref 0.6–1.3)
EOSINOPHIL # BLD AUTO: 0.11 THOUSAND/ΜL (ref 0–0.61)
EOSINOPHIL NFR BLD AUTO: 2 % (ref 0–6)
ERYTHROCYTE [DISTWIDTH] IN BLOOD BY AUTOMATED COUNT: 13.3 % (ref 11.6–15.1)
GFR SERPL CREATININE-BSD FRML MDRD: 110 ML/MIN/1.73SQ M
GLUCOSE SERPL-MCNC: 97 MG/DL (ref 65–140)
HCT VFR BLD AUTO: 44.3 % (ref 36.5–49.3)
HGB BLD-MCNC: 15.1 G/DL (ref 12–17)
LIPASE SERPL-CCNC: 98 U/L (ref 73–393)
LYMPHOCYTES # BLD AUTO: 1.01 THOUSANDS/ΜL (ref 0.6–4.47)
LYMPHOCYTES NFR BLD AUTO: 15 % (ref 14–44)
MCH RBC QN AUTO: 27.7 PG (ref 26.8–34.3)
MCHC RBC AUTO-ENTMCNC: 34.1 G/DL (ref 31.4–37.4)
MCV RBC AUTO: 81 FL (ref 82–98)
MONOCYTES # BLD AUTO: 0.98 THOUSAND/ΜL (ref 0.17–1.22)
MONOCYTES NFR BLD AUTO: 15 % (ref 4–12)
NEUTROPHILS # BLD AUTO: 4.46 THOUSANDS/ΜL (ref 1.85–7.62)
NEUTS SEG NFR BLD AUTO: 68 % (ref 43–75)
PLATELET # BLD AUTO: 291 THOUSANDS/UL (ref 149–390)
PMV BLD AUTO: 9.1 FL (ref 8.9–12.7)
POTASSIUM SERPL-SCNC: 6.1 MMOL/L (ref 3.5–5.3)
PROT SERPL-MCNC: 8.2 G/DL (ref 6.4–8.2)
RBC # BLD AUTO: 5.45 MILLION/UL (ref 3.88–5.62)
SODIUM SERPL-SCNC: 133 MMOL/L (ref 136–145)
WBC # BLD AUTO: 6.57 THOUSAND/UL (ref 4.31–10.16)

## 2017-12-31 PROCEDURE — 96361 HYDRATE IV INFUSION ADD-ON: CPT

## 2017-12-31 PROCEDURE — 74177 CT ABD & PELVIS W/CONTRAST: CPT

## 2017-12-31 PROCEDURE — 96375 TX/PRO/DX INJ NEW DRUG ADDON: CPT

## 2017-12-31 PROCEDURE — 83690 ASSAY OF LIPASE: CPT | Performed by: EMERGENCY MEDICINE

## 2017-12-31 PROCEDURE — 80053 COMPREHEN METABOLIC PANEL: CPT | Performed by: EMERGENCY MEDICINE

## 2017-12-31 PROCEDURE — 96374 THER/PROPH/DIAG INJ IV PUSH: CPT

## 2017-12-31 PROCEDURE — 85025 COMPLETE CBC W/AUTO DIFF WBC: CPT | Performed by: EMERGENCY MEDICINE

## 2017-12-31 PROCEDURE — 99284 EMERGENCY DEPT VISIT MOD MDM: CPT

## 2017-12-31 PROCEDURE — 36415 COLL VENOUS BLD VENIPUNCTURE: CPT | Performed by: EMERGENCY MEDICINE

## 2017-12-31 RX ORDER — DICYCLOMINE HCL 20 MG
20 TABLET ORAL 2 TIMES DAILY
Qty: 20 TABLET | Refills: 0 | Status: SHIPPED | OUTPATIENT
Start: 2017-12-31 | End: 2018-08-16

## 2017-12-31 RX ORDER — DICYCLOMINE HCL 20 MG
20 TABLET ORAL ONCE
Status: COMPLETED | OUTPATIENT
Start: 2017-12-31 | End: 2017-12-31

## 2017-12-31 RX ORDER — ONDANSETRON 4 MG/1
4 TABLET, ORALLY DISINTEGRATING ORAL EVERY 8 HOURS PRN
Qty: 10 TABLET | Refills: 0 | Status: SHIPPED | OUTPATIENT
Start: 2017-12-31 | End: 2018-08-16

## 2017-12-31 RX ORDER — DICYCLOMINE HCL 20 MG
20 TABLET ORAL 3 TIMES DAILY PRN
Qty: 20 TABLET | Refills: 0 | Status: SHIPPED | OUTPATIENT
Start: 2017-12-31 | End: 2018-08-16

## 2017-12-31 RX ORDER — ONDANSETRON 2 MG/ML
4 INJECTION INTRAMUSCULAR; INTRAVENOUS ONCE
Status: COMPLETED | OUTPATIENT
Start: 2017-12-31 | End: 2017-12-31

## 2017-12-31 RX ORDER — ONDANSETRON 4 MG/1
4 TABLET, ORALLY DISINTEGRATING ORAL EVERY 8 HOURS PRN
Qty: 20 TABLET | Refills: 0 | Status: SHIPPED | OUTPATIENT
Start: 2017-12-31 | End: 2018-08-16

## 2017-12-31 RX ORDER — KETOROLAC TROMETHAMINE 30 MG/ML
15 INJECTION, SOLUTION INTRAMUSCULAR; INTRAVENOUS ONCE
Status: COMPLETED | OUTPATIENT
Start: 2017-12-31 | End: 2017-12-31

## 2017-12-31 RX ADMIN — SODIUM CHLORIDE 1000 ML: 0.9 INJECTION, SOLUTION INTRAVENOUS at 17:15

## 2017-12-31 RX ADMIN — KETOROLAC TROMETHAMINE 15 MG: 30 INJECTION, SOLUTION INTRAMUSCULAR at 17:12

## 2017-12-31 RX ADMIN — IODIXANOL 100 ML: 320 INJECTION, SOLUTION INTRAVASCULAR at 17:53

## 2017-12-31 RX ADMIN — ONDANSETRON 4 MG: 2 INJECTION INTRAMUSCULAR; INTRAVENOUS at 17:13

## 2017-12-31 RX ADMIN — DICYCLOMINE HYDROCHLORIDE 20 MG: 20 TABLET ORAL at 17:12

## 2017-12-31 NOTE — ED PROVIDER NOTES
History  Chief Complaint   Patient presents with    Abdominal Pain     Pt presents to ED from home w/ abd pain for 3 days  Pt (+) diarrhea 15x/daily  Pt (-) N/V   Pt (-) health hx      3 days of n/v/d        History provided by:  Patient   used: No    Diarrhea   Quality:  Explosive  Severity:  Severe  Onset quality:  Sudden  Duration:  3 days  Timing:  Constant  Progression:  Worsening  Ineffective treatments:  None tried  Associated symptoms: abdominal pain and vomiting    Associated symptoms: no arthralgias and no headaches    Abdominal pain:     Location:  LLQ    Quality: gnawing      Severity:  Moderate    Onset quality:  Gradual    Duration:  3 days    Timing:  Intermittent    Progression:  Waxing and waning    Chronicity:  New  Vomiting:     Severity:  Mild    Duration:  3 days    Timing:  Intermittent    Progression:  Partially resolved  Risk factors: no recent antibiotic use and no travel to endemic areas        Prior to Admission Medications   Prescriptions Last Dose Informant Patient Reported? Taking?   dicyclomine (BENTYL) 10 mg capsule   No No   Sig: Take 1 capsule by mouth 4 (four) times a day (before meals and at bedtime) for 5 days   ondansetron (ZOFRAN-ODT) 4 mg disintegrating tablet   No No   Sig: Take 1 tablet by mouth every 8 (eight) hours as needed for nausea or vomiting for up to 7 days   pantoprazole (PROTONIX) 20 mg tablet   Yes No   Sig: Take 20 mg by mouth daily      Facility-Administered Medications: None       Past Medical History:   Diagnosis Date    GERD (gastroesophageal reflux disease)        Past Surgical History:   Procedure Laterality Date    PA REPAIR UMBILICAL HAVW,7+S/M,IXSVN N/A 2/1/2016    Procedure: UMBILICAL HERNIA REPAIR ;  Surgeon: Cesar Herndon DO;  Location: [facility];  Service: General       History reviewed  No pertinent family history  I have reviewed and agree with the history as documented      Social History   Substance Use Topics    Smoking status: Current Every Day Smoker     Packs/day: 0 50     Types: Cigarettes    Smokeless tobacco: Never Used    Alcohol use No        Review of Systems   Constitutional: Negative for activity change and appetite change  HENT: Negative for congestion and facial swelling  Eyes: Negative for discharge and redness  Respiratory: Negative for cough and wheezing  Cardiovascular: Negative for chest pain and leg swelling  Gastrointestinal: Positive for abdominal pain, diarrhea and vomiting  Negative for abdominal distention and blood in stool  Endocrine: Negative for cold intolerance and polydipsia  Genitourinary: Negative for difficulty urinating and hematuria  Musculoskeletal: Negative for arthralgias and gait problem  Skin: Negative for color change and rash  Allergic/Immunologic: Negative for food allergies and immunocompromised state  Neurological: Negative for dizziness, seizures and headaches  Hematological: Negative for adenopathy  Does not bruise/bleed easily  Psychiatric/Behavioral: Negative for agitation, confusion and decreased concentration  All other systems reviewed and are negative  Physical Exam  ED Triage Vitals [12/31/17 1611]   Temperature Pulse Respirations Blood Pressure SpO2   97 8 °F (36 6 °C) 73 16 129/67 100 %      Temp Source Heart Rate Source Patient Position - Orthostatic VS BP Location FiO2 (%)   Oral Monitor Sitting Left arm --      Pain Score       7           Orthostatic Vital Signs  Vitals:    12/31/17 1611   BP: 129/67   Pulse: 73   Patient Position - Orthostatic VS: Sitting       Physical Exam   Constitutional: He is oriented to person, place, and time  He appears well-developed and well-nourished  Non-toxic appearance  HENT:   Head: Normocephalic and atraumatic     Right Ear: Tympanic membrane normal    Left Ear: Tympanic membrane normal    Nose: Nose normal    Mouth/Throat: Oropharynx is clear and moist    Eyes: Conjunctivae, EOM and lids are normal  Pupils are equal, round, and reactive to light  Neck: Trachea normal and normal range of motion  Neck supple  No JVD present  Carotid bruit is not present  Cardiovascular: Normal rate, regular rhythm, normal heart sounds and intact distal pulses  No extrasystoles are present  Pulmonary/Chest: Effort normal  He has no decreased breath sounds  He has no wheezes  He has no rhonchi  He has no rales  He exhibits no tenderness and no deformity  Abdominal: Soft  Bowel sounds are normal  There is tenderness in the left lower quadrant  There is no rebound, no guarding and no CVA tenderness  Musculoskeletal:        Right shoulder: He exhibits normal range of motion, no tenderness, no swelling and no deformity  Cervical back: Normal  He exhibits normal range of motion, no tenderness, no bony tenderness and no deformity  Lymphadenopathy:     He has no cervical adenopathy  He has no axillary adenopathy  Neurological: He is alert and oriented to person, place, and time  He has normal strength and normal reflexes  No cranial nerve deficit or sensory deficit  He displays a negative Romberg sign  Skin: Skin is warm and dry  Psychiatric: He has a normal mood and affect  His speech is normal and behavior is normal  Judgment and thought content normal  Cognition and memory are normal    Nursing note and vitals reviewed        ED Medications  Medications   ondansetron (ZOFRAN) injection 4 mg (4 mg Intravenous Given 12/31/17 1713)   ketorolac (TORADOL) injection 15 mg (15 mg Intravenous Given 12/31/17 1712)   sodium chloride 0 9 % bolus 1,000 mL (1,000 mL Intravenous New Bag 12/31/17 1715)   dicyclomine (BENTYL) tablet 20 mg (20 mg Oral Given 12/31/17 1712)   iodixanol (VISIPAQUE) 320 MG/ML injection 100 mL (100 mL Intravenous Given 12/31/17 1753)       Diagnostic Studies  Results Reviewed     Procedure Component Value Units Date/Time    CMP [[id number]]  (Abnormal) Collected:  12/31/17 1702    Lab Status:  Final result Specimen:  Blood from Arm, Left Updated:  12/31/17 1743     Sodium 133 (L) mmol/L      Potassium 6 1 (H) mmol/L      Chloride 97 (L) mmol/L      CO2 27 mmol/L      Anion Gap 9 mmol/L      BUN 15 mg/dL      Creatinine 0 80 mg/dL      Glucose 97 mg/dL      Calcium 9 0 mg/dL      AST 56 (H) U/L      ALT 61 U/L      Alkaline Phosphatase 71 U/L      Total Protein 8 2 g/dL      Albumin 3 8 g/dL      Total Bilirubin 0 70 mg/dL      eGFR 110 ml/min/1 73sq m     Narrative:         National Kidney Disease Education Program recommendations are as follows:  GFR calculation is accurate only with a steady state creatinine  Chronic Kidney disease less than 60 ml/min/1 73 sq  meters  Kidney failure less than 15 ml/min/1 73 sq  meters  Lipase [[id number]]  (Normal) Collected:  12/31/17 1702    Lab Status:  Final result Specimen:  Blood from Arm, Left Updated:  12/31/17 1732     Lipase 98 u/L     CBC and differential [[id number]]  (Abnormal) Collected:  12/31/17 1702    Lab Status:  Final result Specimen:  Blood from Arm, Left Updated:  12/31/17 1715     WBC 6 57 Thousand/uL      RBC 5 45 Million/uL      Hemoglobin 15 1 g/dL      Hematocrit 44 3 %      MCV 81 (L) fL      MCH 27 7 pg      MCHC 34 1 g/dL      RDW 13 3 %      MPV 9 1 fL      Platelets 137 Thousands/uL      Neutrophils Relative 68 %      Lymphocytes Relative 15 %      Monocytes Relative 15 (H) %      Eosinophils Relative 2 %      Basophils Relative 0 %      Neutrophils Absolute 4 46 Thousands/µL      Lymphocytes Absolute 1 01 Thousands/µL      Monocytes Absolute 0 98 Thousand/µL      Eosinophils Absolute 0 11 Thousand/µL      Basophils Absolute 0 01 Thousands/µL                  CT abdomen pelvis with contrast   Final Result by Binu Barragan MD (12/31 1811)      No acute abnormality within the abdomen or pelvis           Workstation performed: [id number]                    Procedures  Procedures       Phone Contacts  ED Phone Contact    ED Course  ED Course                                MDM  Number of Diagnoses or Management Options  Gastroenteritis: new and requires workup     Amount and/or Complexity of Data Reviewed  Clinical lab tests: ordered and reviewed  Tests in the radiology section of CPT®: ordered and reviewed  Tests in the medicine section of CPT®: ordered and reviewed    Patient Progress  Patient progress: improved    CritCare Time    Disposition  Final diagnoses:   Gastroenteritis     Time reflects when diagnosis was documented in both MDM as applicable and the Disposition within this note     Time User Action Codes Description Comment    12/31/2017  6:27 PM Willy Tavarez [K52 9] Gastroenteritis       ED Disposition     ED Disposition Condition Comment    Discharge  Luda Napier discharge to home/self care  Condition at discharge: Good        Follow-up Information     Follow up With Specialties Details Why Gia 124, DO Internal Medicine Schedule an appointment as soon as possible for a visit  [address] [place] [address],[address]  [address] [place] [address]  [phone]          Patient's Medications   Discharge Prescriptions    DICYCLOMINE (BENTYL) 20 MG TABLET    Take 1 tablet by mouth 3 (three) times a day as needed (crampy abd pain)       Start Date: 12/31/2017End Date: --       Order Dose: 20 mg       Quantity: 20 tablet    Refills: 0    DICYCLOMINE (BENTYL) 20 MG TABLET    Take 1 tablet by mouth 2 (two) times a day       Start Date: 12/31/2017End Date: --       Order Dose: 20 mg       Quantity: 20 tablet    Refills: 0    ONDANSETRON (ZOFRAN-ODT) 4 MG DISINTEGRATING TABLET    Take 1 tablet by mouth every 8 (eight) hours as needed for nausea or vomiting for up to 7 days       Start Date: 12/31/2017End Date: 1/7/2018       Order Dose: 4 mg       Quantity: 20 tablet    Refills: 0     No discharge procedures on file      ED Provider  Electronically Signed by           Kimberly Ramos DO  12/31/17 9041

## 2017-12-31 NOTE — DISCHARGE INSTRUCTIONS
Enteritis   WHAT YOU NEED TO KNOW:   Enteritis is inflammation of the small intestine  It may be caused by eating foods or drinking liquids contaminated with a virus, bacteria, or parasites  It may also be caused by certain medicines, damage from radiation, and medical conditions such as Crohn disease  DISCHARGE INSTRUCTIONS:   Return to the emergency department if:   · You cannot stop vomiting  · You have not urinated for 12 hours  Contact your healthcare provider if:   · You have a fever over 101 5  · You have blood or mucus in your bowel movements  · You continue to vomit or have diarrhea for more than 3 days, even after treatment  · You have a dry mouth and eyes, you are urinating less than usual, and you feel dizzy when you stand up  · Your mouth or eyes are dry  You are not urinating as much or as often  · You are losing weight without trying  · You have questions or concerns about your condition or care  Medicines:   · Medicines  may be given to fight an infection caused by bacteria or a parasite  You may also need medicines to slow or stop your diarrhea or vomiting  Do not take these medicines unless your healthcare provider say it is okay  Other medicines may be needed to treat medical conditions that are causing enteritis  · Take your medicine as directed  Contact your healthcare provider if you think your medicine is not helping or if you have side effects  Tell him of her if you are allergic to any medicine  Keep a list of the medicines, vitamins, and herbs you take  Include the amounts, and when and why you take them  Bring the list or the pill bottles to follow-up visits  Carry your medicine list with you in case of an emergency  Manage enteritis:   · Eat foods that help to decrease symptoms  Limit or avoid foods and liquids that are high in sugar, fat, and fiber to help relieve diarrhea  It may be helpful to avoid lactose   Lactose is a type of sugar that is found in milk products  You may be able to tolerate soups, broths, well-cooked vegetables, canned fruit, and baked or broiled meats  Ask your dietitian or healthcare provider if you should follow a special diet  You may need to avoid other foods if you have certain medical conditions such as celiac disease  · Drink liquids as directed  Ask how much liquid to drink each day and which liquids are best for you  It is important to prevent or treat dehydration  Even if you have been vomiting, suck on ice chips or take small sips of clear liquids often  Slowly increase the amount of clear liquids you drink  If you become dehydrated, you may need IV liquids  · Drink an oral rehydration solution (ORS) as directed  An ORS contains water, salts, and sugar that are needed to replace lost body fluids  Ask what kind of ORS to use, how much to drink, and where to get it  Prevent enteritis:  Enteritis that is caused by bacteria, parasites, or viruses can be prevented  The following may help to prevent this type of enteritis:  · Wash your hands often  Use soap and water  Wash your hands after you use the bathroom, change a child's diapers, or sneeze  Wash your hands before you prepare or eat food  · Clean surfaces and do laundry often  Wash your clothes and towels separately from the rest of the laundry  Clean surfaces in your home with antibacterial  or bleach  · Clean food thoroughly and cook safely  Wash raw vegetables before you cook  Cook meat, fish, and eggs fully  Do not use the same dishes for raw meat as you do for other foods  Refrigerate any leftover food immediately  · Be aware when you camp or travel  Drink only clean water  Do not drink from rivers or lakes unless you purify or boil the water first  When you travel, drink bottled water and do not add ice  Do not eat fruit that has not been peeled  Do not eat raw fish or meat that is not fully cooked    Follow up with your healthcare provider as directed:  Write down your questions so you remember to ask them during your visits  © 2017 [employer] Information is for End User's use only and may not be sold, redistributed or otherwise used for commercial purposes  All illustrations and images included in CareNotes® are the copyrighted property of [employer]  or Yoni Villa  The above information is an  only  It is not intended as medical advice for individual conditions or treatments  Talk to your doctor, nurse or pharmacist before following any medical regimen to see if it is safe and effective for you

## 2018-01-09 NOTE — RESULT NOTES
Verified Results  (1) CBC/PLT/DIFF [id number] 11:02AM [facility]: [id number]     Test Name Result Flag Reference   WBC COUNT 6 90 Thousand/uL  4 31-10 16   RBC COUNT 5 54 Million/uL  3 88-5 62   HEMOGLOBIN 14 3 g/dL  12 0-17 0   HEMATOCRIT 42 4 %  36 5-49 3   MCV 77 fL L 82-98   MCH 25 8 pg L 26 8-34 3   MCHC 33 7 g/dL  31 4-37 4   RDW 17 2 % H 11 6-15 1   MPV 8 9 fL  8 9-12 7   PLATELET COUNT 546 Thousands/uL  149-390   NEUTROPHILS RELATIVE PERCENT 66 %  43-75   LYMPHOCYTES RELATIVE PERCENT 25 %  14-44   MONOCYTES RELATIVE PERCENT 8 %  4-12   EOSINOPHILS RELATIVE PERCENT 1 %  0-6   BASOPHILS RELATIVE PERCENT 0 %  0-1   NEUTROPHILS ABSOLUTE COUNT 4 51 Thousands/? ??L  1 85-7 62   LYMPHOCYTES ABSOLUTE COUNT 1 73 Thousands/? ??L  0 60-4 47   MONOCYTES ABSOLUTE COUNT 0 57 Thousand/? ??L  0 17-1 22   EOSINOPHILS ABSOLUTE COUNT 0 07 Thousand/? ??L  0 00-0 61   BASOPHILS ABSOLUTE COUNT 0 02 Thousands/? ??L  0 00-0 10     (1) IRON SATURATION %, TIBC [id number] 11:02AM Alba Hamzah   [facility] Order Number: [id number]     Test Name Result Flag Reference   IRON SATURATION 23 %     TOTAL IRON BINDING CAPACITY 438 ug/dL  250-450   IRON 99 ug/dL     Patients treated with metal-binding drugs (ie  Deferoxamine) may have depressed iron values       (1) FERRITIN [id number] 11:02AM Alba Hamzah    Order Number: [id number]     Test Name Result Flag Reference   FERRITIN 25 ng/mL  5-175

## 2018-01-11 NOTE — RESULT NOTES
Verified Results  (1) CBC/PLT/DIFF [id number] 09:52AM Cheryl Cantu    Order Number: [id number]     Test Name Result Flag Reference   WBC COUNT 7 22 Thousand/uL  4 31-10 16   RBC COUNT 5 17 Million/uL  3 88-5 62   HEMOGLOBIN 14 5 g/dL  12 0-17 0   HEMATOCRIT 42 4 %  36 5-49 3   MCV 82 fL  82-98   MCH 28 0 pg  26 8-34 3   MCHC 34 2 g/dL  31 4-37 4   RDW 13 9 %  11 6-15 1   MPV 9 3 fL  8 9-12 7   PLATELET COUNT 169 Thousands/uL  149-390   NEUTROPHILS RELATIVE PERCENT 67 %  43-75   LYMPHOCYTES RELATIVE PERCENT 24 %  14-44   MONOCYTES RELATIVE PERCENT 8 %  4-12   EOSINOPHILS RELATIVE PERCENT 1 %  0-6   BASOPHILS RELATIVE PERCENT 0 %  0-1   NEUTROPHILS ABSOLUTE COUNT 4 79 Thousands/? ??L  1 85-7 62   LYMPHOCYTES ABSOLUTE COUNT 1 71 Thousands/? ??L  0 60-4 47   MONOCYTES ABSOLUTE COUNT 0 61 Thousand/? ??L  0 17-1 22   EOSINOPHILS ABSOLUTE COUNT 0 10 Thousand/? ??L  0 00-0 61   BASOPHILS ABSOLUTE COUNT 0 01 Thousands/? ??L  0 00-0 10     (1) IRON SATURATION %, TIBC [id number] 09:52AM Cheryl Cantu    Order Number: [id number]     Test Name Result Flag Reference   IRON SATURATION 25 %     TOTAL IRON BINDING CAPACITY 426 ug/dL  250-450   IRON 107 ug/dL     Patients treated with metal-binding drugs (ie  Deferoxamine) may have depressed iron values       (1) FERRITIN [id number] 09:52AM Cheryl Cantu    Order Number: [id number]     Test Name Result Flag Reference   FERRITIN 22 ng/mL  2-784

## 2018-01-12 VITALS
BODY MASS INDEX: 26.66 KG/M2 | HEIGHT: 69 IN | WEIGHT: 180 LBS | DIASTOLIC BLOOD PRESSURE: 81 MMHG | HEART RATE: 88 BPM | SYSTOLIC BLOOD PRESSURE: 121 MMHG

## 2018-01-12 VITALS
BODY MASS INDEX: 26.66 KG/M2 | WEIGHT: 180 LBS | HEART RATE: 105 BPM | DIASTOLIC BLOOD PRESSURE: 71 MMHG | HEIGHT: 69 IN | SYSTOLIC BLOOD PRESSURE: 110 MMHG

## 2018-01-12 NOTE — RESULT NOTES
Verified Results  (1) BASIC METABOLIC PROFILE [id number] 11:25AM Kimberley Lev Order Number: [id number]     Test Name Result Flag Reference   SODIUM 137 mmol/L  136-145   POTASSIUM 4 8 mmol/L  3 5-5 3   CHLORIDE 102 mmol/L  100-108   CARBON DIOXIDE 29 mmol/L  21-32   ANION GAP (CALC) 6 mmol/L  4-13   BLOOD UREA NITROGEN 20 mg/dL  5-25   CREATININE 0 81 mg/dL  0 60-1 30   Standardized to IDMS reference method   CALCIUM 8 9 mg/dL  8 3-10 1   eGFR Non-African American      >60 0 ml/min/1 73sq m   [facility] Disease Education Program recommendations are as follows:  GFR calculation is accurate only with a steady state creatinine  Chronic Kidney disease less than 60 ml/min/1 73 sq  meters  Kidney failure less than 15 ml/min/1 73 sq  meters  GLUCOSE FASTING 93 mg/dL  65-99     (1) TSH WITH FT4 REFLEX 22Apr2017 11:25AM [employer] Order Number: [id number]     Test Name Result Flag Reference   TSH 0 554 uIU/mL  0 358-3 740   Patients undergoing fluorescein dye angiography may retain small amounts of fluorescein in the body for 48-72 hours post procedure  Samples containing fluorescein can produce falsely depressed TSH values  If the patient had this procedure,a specimen should be resubmitted post fluorescein clearance  (1) LIPID PANEL FASTING W DIRECT LDL REFLEX 22Apr2017 11:25AM [employer] Order Number: [id number]     Test Name Result Flag Reference   CHOLESTEROL 145 mg/dL     LDL CHOLESTEROL CALCULATED 62 mg/dL  0-100   - Patient Instructions:  This is a fasting blood test  Water, black tea or black coffee only after 9:00pm the night before test   Drink 2 glasses of water the morning of test       Triglyceride:         Normal              <150 mg/dl       Borderline High    150-199 mg/dl       High               200-499 mg/dl       Very High          >499 mg/dl  Cholesterol:         Desirable        <200 mg/dl      Borderline High  200-239 mg/dl      High >239 mg/dl  HDL Cholesterol:        High    >59 mg/dL      Low     <41 mg/dL  LDL Cholesterol:        Optimal          <100 mg/dl        Near Optimal     100-129 mg/dl        Above Optimal          Borderline High   130-159 mg/dl          High              160-189 mg/dl          Very High        >189 mg/dl  LDL CALCULATED:    This screening LDL is a calculated result  It does not have the accuracy of the Direct Measured LDL in the monitoring of patients with hyperlipidemia and/or statin therapy  Direct Measure LDL (RRE019) must be ordered separately in these patients  TRIGLYCERIDES 215 mg/dL H <=150   Specimen collection should occur prior to N-Acetylcysteine or Metamizole administration due to the potential for falsely depressed results  HDL,DIRECT 40 mg/dL  40-60   Specimen collection should occur prior to Metamizole administration due to the potential for falsely depressed results

## 2018-01-13 VITALS
HEIGHT: 69 IN | RESPIRATION RATE: 18 BRPM | HEART RATE: 90 BPM | SYSTOLIC BLOOD PRESSURE: 100 MMHG | DIASTOLIC BLOOD PRESSURE: 70 MMHG | TEMPERATURE: 98 F | WEIGHT: 172.5 LBS | BODY MASS INDEX: 25.55 KG/M2 | OXYGEN SATURATION: 98 %

## 2018-01-13 VITALS
HEIGHT: 69 IN | WEIGHT: 186.25 LBS | BODY MASS INDEX: 27.59 KG/M2 | HEART RATE: 84 BPM | SYSTOLIC BLOOD PRESSURE: 101 MMHG | DIASTOLIC BLOOD PRESSURE: 69 MMHG

## 2018-01-13 VITALS
SYSTOLIC BLOOD PRESSURE: 120 MMHG | OXYGEN SATURATION: 98 % | HEIGHT: 69 IN | BODY MASS INDEX: 27.59 KG/M2 | HEART RATE: 96 BPM | WEIGHT: 186.25 LBS | RESPIRATION RATE: 18 BRPM | DIASTOLIC BLOOD PRESSURE: 82 MMHG

## 2018-01-13 VITALS
HEIGHT: 69 IN | DIASTOLIC BLOOD PRESSURE: 68 MMHG | BODY MASS INDEX: 24.88 KG/M2 | HEART RATE: 84 BPM | WEIGHT: 168 LBS | SYSTOLIC BLOOD PRESSURE: 141 MMHG

## 2018-01-13 NOTE — RESULT NOTES
Message   shoulder x-ray results are back  there is no arthritis or bony abnormalities in shoulder  Recommend to continue with current treatment plan as discussed today, thanks     Verified Results  * XR SHOULDER 2+ VIEW RIGHT [id number] 02:02PM Kimberley Barcenashand Order Number: [id number]     Test Name Result Flag Reference   XR SHOULDER 2+ VW RIGHT (Report)     RIGHT SHOULDER     INDICATION: Chronic generalized shoulder pain  COMPARISON: None     VIEWS: 3; 3 images     FINDINGS:     There is no acute fracture or dislocation  No degenerative changes  No lytic or blastic lesions are seen  Soft tissues are unremarkable  IMPRESSION:     No acute osseous abnormality         Workstation performed: [id number]     Signed by:   Va Kurtz MD   8/15/16

## 2018-01-14 VITALS
BODY MASS INDEX: 26.66 KG/M2 | HEART RATE: 75 BPM | SYSTOLIC BLOOD PRESSURE: 145 MMHG | WEIGHT: 180 LBS | DIASTOLIC BLOOD PRESSURE: 91 MMHG | HEIGHT: 69 IN

## 2018-01-14 VITALS
BODY MASS INDEX: 25.09 KG/M2 | OXYGEN SATURATION: 98 % | SYSTOLIC BLOOD PRESSURE: 122 MMHG | DIASTOLIC BLOOD PRESSURE: 68 MMHG | HEIGHT: 69 IN | WEIGHT: 169.38 LBS | RESPIRATION RATE: 18 BRPM | HEART RATE: 74 BPM

## 2018-01-14 VITALS
WEIGHT: 182.13 LBS | RESPIRATION RATE: 18 BRPM | BODY MASS INDEX: 26.97 KG/M2 | HEART RATE: 78 BPM | SYSTOLIC BLOOD PRESSURE: 134 MMHG | DIASTOLIC BLOOD PRESSURE: 76 MMHG | HEIGHT: 69 IN | OXYGEN SATURATION: 96 %

## 2018-01-14 VITALS
OXYGEN SATURATION: 99 % | HEART RATE: 80 BPM | BODY MASS INDEX: 26.36 KG/M2 | SYSTOLIC BLOOD PRESSURE: 122 MMHG | HEIGHT: 69 IN | WEIGHT: 178 LBS | RESPIRATION RATE: 18 BRPM | TEMPERATURE: 98 F | DIASTOLIC BLOOD PRESSURE: 88 MMHG

## 2018-01-14 VITALS
HEIGHT: 69 IN | BODY MASS INDEX: 25.03 KG/M2 | SYSTOLIC BLOOD PRESSURE: 112 MMHG | TEMPERATURE: 96.8 F | HEART RATE: 74 BPM | WEIGHT: 169 LBS | RESPIRATION RATE: 18 BRPM | OXYGEN SATURATION: 99 % | DIASTOLIC BLOOD PRESSURE: 72 MMHG

## 2018-01-14 VITALS
TEMPERATURE: 96.7 F | DIASTOLIC BLOOD PRESSURE: 62 MMHG | OXYGEN SATURATION: 99 % | BODY MASS INDEX: 25.4 KG/M2 | WEIGHT: 172 LBS | HEART RATE: 68 BPM | SYSTOLIC BLOOD PRESSURE: 116 MMHG

## 2018-01-14 NOTE — MISCELLANEOUS
Dear to whom it may concern    Lyn Hampton is requesting a lower bunk bed due to his history of ankle fracture in past which required surgery with placement of hardware      [address] [address]  Internal Medicine      Electronically signed by:Alireza Gary DO  Apr 20 2017 12:06PM EST Author

## 2018-01-15 NOTE — MISCELLANEOUS
Message   Recorded as Task   Date: 02/14/2017 04:03 PM, Created By: Jessica Pineda   Task Name: Med Renewal Request   Assigned To: Alireza Rodriguez   Regarding Patient: Rod Nye, Status: Active   Comment:    Jessica Pineda - 14 Feb 2017 4:03 PM     TASK CREATED  Please update Trazodone to 90-day supply per insurance  Phone: [phone]; please call patient once completed          Plan  Anxiety, Insomnia    · TraZODone HCl - 50 MG Oral Tablet; take 1 tablet at bedtime as needed for  sleep    Signatures   Electronically signed by : Pippa Alcala DO; Feb 14 2017  5:34PM EST                       (Author)

## 2018-01-16 NOTE — MISCELLANEOUS
Message   Recorded as Task   Date: 10/24/2017 03:09 PM, Created By: Micha Bardales   Task Name: Follow Up   Assigned To: Alireza Rodriguez   Regarding Patient: Yue Talamantes, Status: In Progress   Comment:    Jayla Hoover - 24 Oct 2017 3:09 PM     TASK CREATED  Caller: [facility]; General Medical Question; [phone]  [facility] pharmacy called  the rabeprazole is on back order and unavailable at this time  please call the pharmacy at  to make a change   Alireza Rodriguez - 24 Oct 2017 3:44 PM     TASK REPLIED TO: Previously Assigned To [employer],Team  I can order pantoprazole, does pharmacy have this rx?    if yes & I order rx, can pharmacy notify Alee Tamayo about medication change?     thanks   Ramon Ureña - 24 Oct 2017 4:31 PM     TASK IN PROGRESS   Ramon Ureña - 24 Oct 2017 4:38 PM     TASK REPLIED TO: Previously Assigned To SL JOSUE,Team  spoke to [facility] pharm who states yes they have pantoprazole and will be able to notify pt of this change        Plan  Acid reflux    · Pantoprazole Sodium 20 MG Oral Tablet Delayed Release; TAKE 1 TABLET DAILY  OR AS NEEDED FOR HEARTBURN - 30 MINUTES BEFORE MEAL SUCH AS  BREAKFAST    Signatures   Electronically signed by Rome Delacruz DO; Oct 24 2017  5:02PM EST                       (Author)

## 2018-01-17 NOTE — MISCELLANEOUS
Message   Recorded as Task   Date: 01/04/2017 08:40 AM, Created By: Iván Leiva   Task Name: Med Renewal Request   Assigned To: Alireza Rodriguez   Regarding Patient: Pati Schlatter, Status: Active   Comment:    Sylvie Kelly - 04 Jan 2017 8:40 AM     TASK CREATED  Caller: Self; Renew Medication; [phone] (Home)  PT  LM W/ ANSWERING SERVICE THAT HE RECENTLY MOVED AND LOST HIS HEARTBURN MED  SEND REFILL TO [facility]          Plan  Acid reflux    · RABEprazole Sodium 20 MG Oral Tablet Delayed Release; TAKE 1 TABLET  DAILY AS NEEDED FOR HEARTBURN    Signatures   Electronically signed by : Lizabeth Najjar, DO; Jan 4 2017 10:31AM EST                       (Author)

## 2018-01-17 NOTE — MISCELLANEOUS
Message   Recorded as Task   Date: 02/10/2017 02:53 PM, Created By: Rodrigue Garcia   Task Name: Follow Up   Assigned To: Alireza Rodriguez   Regarding Patient: Shaquille Phan, Status: Active   Comment:    Jayla Hoover - 10 Feb 2017 2:53 PM     TASK CREATED  Caller: Self; General Medical Question; [phone] (Home)  PTS LEG SHAKES ALOT AT NIGHT  WORSE WHEN TIRED  PLEASE CALL PT  [phone] TO ADVISE  HE IS ASKING ABOUT MEDICATION FOR IT   PHARMACY IS [facility], [place]    called and spoke to patient    poor historian, his description of sx changes during conversation  c/o legs moving when lying in bed watching TV and when using smartphone/mobile devices  has no trouble sleeping and no leg movement while asleep  no leg swelling or leg pains  pt reports it does not bother him but bothers his GF, later reports it does bother him(?)    worried it could be from drinking too much etoh  stretches his leg and feels relief  looked up sx in internet and found it could be a sign of neurologic disorder    hx of anxiety in past, declines to treat except with prn BZD  no other sleep sx or complaints    plan - advised if his symptoms occur while lying in bed watching TV and/or while on smartphone & do not bother him while he is asleep and he sleeps okay, this is not c/w RLS   discussed sleep hygeine   advised to cut down on etoh intake, drink plenty of water, strech his legs daily and exercise regularly   also advised to ask his GF if he has repeated/continuous leg movements during his sleep(does not sound like this is the case) & let me know   if not improved, t/c sleep eval vs neuro eval      Signatures   Electronically signed by : Michael Hernandez DO; Feb 10 2017  5:17PM EST                       (Author)

## 2018-01-23 VITALS
DIASTOLIC BLOOD PRESSURE: 81 MMHG | HEIGHT: 69 IN | BODY MASS INDEX: 24.29 KG/M2 | SYSTOLIC BLOOD PRESSURE: 123 MMHG | WEIGHT: 164 LBS | HEART RATE: 86 BPM

## 2018-02-15 DIAGNOSIS — G25.81 RESTLESS LEG SYNDROME: Primary | ICD-10-CM

## 2018-02-15 RX ORDER — PRAMIPEXOLE DIHYDROCHLORIDE 0.12 MG/1
TABLET ORAL
Qty: 60 TABLET | Refills: 3 | Status: SHIPPED | OUTPATIENT
Start: 2018-02-15 | End: 2018-08-16

## 2018-02-15 NOTE — TELEPHONE ENCOUNTER
Will refill this time but pt should be informed that further refills should be from PCP as he was only seen once and was to follow up prn

## 2018-02-15 NOTE — TELEPHONE ENCOUNTER
I called and spoke to pt and advised him that his medication was refilled but that future refills should come from his PCP  He is aware and aggreable and asked that I forward this message to his PCP to be aware for the future

## 2018-04-10 ENCOUNTER — TELEPHONE (OUTPATIENT)
Dept: OBGYN CLINIC | Facility: HOSPITAL | Age: 43
End: 2018-04-10

## 2018-04-10 DIAGNOSIS — M25.511 ACUTE PAIN OF BOTH SHOULDERS: Primary | ICD-10-CM

## 2018-04-10 DIAGNOSIS — M25.512 ACUTE PAIN OF BOTH SHOULDERS: Primary | ICD-10-CM

## 2018-04-10 RX ORDER — IBUPROFEN 800 MG/1
800 TABLET ORAL EVERY 8 HOURS PRN
Qty: 60 TABLET | Refills: 0 | Status: SHIPPED | OUTPATIENT
Start: 2018-04-10 | End: 2018-05-02 | Stop reason: SDUPTHER

## 2018-04-10 NOTE — TELEPHONE ENCOUNTER
Caller: patient  Call back number: [phone]  Patient's doctor: Dr Patrick Bee    Patient called stating he needs a refill of ibuprofen 800mg  Patient has not been seen since 2/24/17  Patient is aware he has not been seen for a year but he does not want to schedule   Please advise

## 2018-05-02 DIAGNOSIS — M25.512 ACUTE PAIN OF BOTH SHOULDERS: ICD-10-CM

## 2018-05-02 DIAGNOSIS — M25.511 ACUTE PAIN OF BOTH SHOULDERS: ICD-10-CM

## 2018-05-02 RX ORDER — IBUPROFEN 800 MG/1
800 TABLET ORAL EVERY 8 HOURS PRN
Qty: 60 TABLET | Refills: 0 | Status: SHIPPED | OUTPATIENT
Start: 2018-05-02 | End: 2019-09-23 | Stop reason: SDUPTHER

## 2018-05-30 ENCOUNTER — TELEPHONE (OUTPATIENT)
Dept: INTERNAL MEDICINE CLINIC | Facility: CLINIC | Age: 43
End: 2018-05-30

## 2018-05-30 DIAGNOSIS — K21.9 GASTROESOPHAGEAL REFLUX DISEASE, ESOPHAGITIS PRESENCE NOT SPECIFIED: Primary | ICD-10-CM

## 2018-05-30 DIAGNOSIS — M25.50 MULTIPLE JOINT PAIN: ICD-10-CM

## 2018-05-30 RX ORDER — ACETAMINOPHEN,DIPHENHYDRAMINE HCL 500; 25 MG/1; MG/1
TABLET, FILM COATED ORAL
COMMUNITY
Start: 2017-04-26 | End: 2018-05-30 | Stop reason: SDUPTHER

## 2018-05-30 RX ORDER — ACETAMINOPHEN,DIPHENHYDRAMINE HCL 500; 25 MG/1; MG/1
1 TABLET, FILM COATED ORAL DAILY
Qty: 30 TABLET | Refills: 0 | Status: SHIPPED | OUTPATIENT
Start: 2018-05-30 | End: 2018-08-16

## 2018-05-30 RX ORDER — RABEPRAZOLE SODIUM 20 MG/1
TABLET, DELAYED RELEASE ORAL
COMMUNITY
Start: 2015-12-28 | End: 2018-05-30 | Stop reason: SDUPTHER

## 2018-05-30 RX ORDER — RABEPRAZOLE SODIUM 20 MG/1
20 TABLET, DELAYED RELEASE ORAL DAILY
Qty: 90 TABLET | Refills: 0 | Status: SHIPPED | OUTPATIENT
Start: 2018-05-30 | End: 2018-08-16 | Stop reason: SDUPTHER

## 2018-05-30 NOTE — TELEPHONE ENCOUNTER
[facility] on  Rabeprazole SOD DR 20 mg tAB, QTY 90, TAKE ONE DAILY AS NEEDED FOR HEARTBURN  AND  [facility] ASPIRIN PM CAPLET, QTY 30, TAKE 1 TABLET AT BEDTIME AS NEEDED FOR JOINT PAINS  TO [facility] ON [address]

## 2018-06-18 ENCOUNTER — APPOINTMENT (EMERGENCY)
Dept: CT IMAGING | Facility: HOSPITAL | Age: 43
End: 2018-06-18
Payer: COMMERCIAL

## 2018-06-18 ENCOUNTER — HOSPITAL ENCOUNTER (EMERGENCY)
Facility: HOSPITAL | Age: 43
Discharge: HOME | End: 2018-06-19
Attending: EMERGENCY MEDICINE
Payer: COMMERCIAL

## 2018-06-18 DIAGNOSIS — R79.89 ELEVATED LFTS: ICD-10-CM

## 2018-06-18 DIAGNOSIS — K52.9 GASTROENTERITIS: Primary | ICD-10-CM

## 2018-06-18 LAB
ALBUMIN SERPL BCP-MCNC: 4.8 G/DL (ref 3.5–5)
ALP SERPL-CCNC: 118 U/L (ref 46–116)
ALT SERPL W P-5'-P-CCNC: 60 U/L (ref 12–78)
ANION GAP SERPL CALCULATED.3IONS-SCNC: 13 MMOL/L (ref 4–13)
AST SERPL W P-5'-P-CCNC: 31 U/L (ref 5–45)
BASOPHILS # BLD AUTO: 0.02 THOUSANDS/ΜL (ref 0–0.1)
BASOPHILS NFR BLD AUTO: 0 % (ref 0–1)
BILIRUB SERPL-MCNC: 1.6 MG/DL (ref 0.2–1)
BUN SERPL-MCNC: 19 MG/DL (ref 5–25)
CALCIUM SERPL-MCNC: 9.6 MG/DL (ref 8.3–10.1)
CHLORIDE SERPL-SCNC: 99 MMOL/L (ref 100–108)
CO2 SERPL-SCNC: 24 MMOL/L (ref 21–32)
CREAT SERPL-MCNC: 1.06 MG/DL (ref 0.6–1.3)
EOSINOPHIL # BLD AUTO: 0.01 THOUSAND/ΜL (ref 0–0.61)
EOSINOPHIL NFR BLD AUTO: 0 % (ref 0–6)
ERYTHROCYTE [DISTWIDTH] IN BLOOD BY AUTOMATED COUNT: 13.5 % (ref 11.6–15.1)
GFR SERPL CREATININE-BSD FRML MDRD: 86 ML/MIN/1.73SQ M
GLUCOSE SERPL-MCNC: 119 MG/DL (ref 65–140)
HCT VFR BLD AUTO: 47.7 % (ref 36.5–49.3)
HGB BLD-MCNC: 16.5 G/DL (ref 12–17)
LIPASE SERPL-CCNC: 112 U/L (ref 73–393)
LYMPHOCYTES # BLD AUTO: 0.86 THOUSANDS/ΜL (ref 0.6–4.47)
LYMPHOCYTES NFR BLD AUTO: 6 % (ref 14–44)
MCH RBC QN AUTO: 27.9 PG (ref 26.8–34.3)
MCHC RBC AUTO-ENTMCNC: 34.6 G/DL (ref 31.4–37.4)
MCV RBC AUTO: 81 FL (ref 82–98)
MONOCYTES # BLD AUTO: 0.66 THOUSAND/ΜL (ref 0.17–1.22)
MONOCYTES NFR BLD AUTO: 5 % (ref 4–12)
NEUTROPHILS # BLD AUTO: 12.8 THOUSANDS/ΜL (ref 1.85–7.62)
NEUTS SEG NFR BLD AUTO: 89 % (ref 43–75)
PLATELET # BLD AUTO: 366 THOUSANDS/UL (ref 149–390)
PMV BLD AUTO: 8.8 FL (ref 8.9–12.7)
POTASSIUM SERPL-SCNC: 4.1 MMOL/L (ref 3.5–5.3)
PROT SERPL-MCNC: 9.1 G/DL (ref 6.4–8.2)
RBC # BLD AUTO: 5.91 MILLION/UL (ref 3.88–5.62)
SODIUM SERPL-SCNC: 136 MMOL/L (ref 136–145)
WBC # BLD AUTO: 14.35 THOUSAND/UL (ref 4.31–10.16)

## 2018-06-18 PROCEDURE — 83690 ASSAY OF LIPASE: CPT | Performed by: EMERGENCY MEDICINE

## 2018-06-18 PROCEDURE — 96361 HYDRATE IV INFUSION ADD-ON: CPT

## 2018-06-18 PROCEDURE — 85025 COMPLETE CBC W/AUTO DIFF WBC: CPT | Performed by: EMERGENCY MEDICINE

## 2018-06-18 PROCEDURE — 80053 COMPREHEN METABOLIC PANEL: CPT | Performed by: EMERGENCY MEDICINE

## 2018-06-18 PROCEDURE — 96374 THER/PROPH/DIAG INJ IV PUSH: CPT

## 2018-06-18 PROCEDURE — 36415 COLL VENOUS BLD VENIPUNCTURE: CPT | Performed by: EMERGENCY MEDICINE

## 2018-06-18 RX ORDER — ONDANSETRON 2 MG/ML
4 INJECTION INTRAMUSCULAR; INTRAVENOUS ONCE
Status: COMPLETED | OUTPATIENT
Start: 2018-06-18 | End: 2018-06-18

## 2018-06-18 RX ADMIN — SODIUM CHLORIDE 1000 ML: 0.9 INJECTION, SOLUTION INTRAVENOUS at 23:12

## 2018-06-18 RX ADMIN — SODIUM CHLORIDE 1000 ML: 0.9 INJECTION, SOLUTION INTRAVENOUS at 22:23

## 2018-06-18 RX ADMIN — ONDANSETRON 4 MG: 2 INJECTION INTRAMUSCULAR; INTRAVENOUS at 22:25

## 2018-06-19 ENCOUNTER — OFFICE VISIT (OUTPATIENT)
Dept: INTERNAL MEDICINE CLINIC | Facility: CLINIC | Age: 43
End: 2018-06-19
Payer: COMMERCIAL

## 2018-06-19 VITALS
SYSTOLIC BLOOD PRESSURE: 121 MMHG | OXYGEN SATURATION: 97 % | TEMPERATURE: 98.3 F | DIASTOLIC BLOOD PRESSURE: 80 MMHG | HEART RATE: 74 BPM | RESPIRATION RATE: 14 BRPM

## 2018-06-19 VITALS
DIASTOLIC BLOOD PRESSURE: 72 MMHG | SYSTOLIC BLOOD PRESSURE: 104 MMHG | WEIGHT: 173 LBS | RESPIRATION RATE: 16 BRPM | HEIGHT: 69 IN | OXYGEN SATURATION: 98 % | HEART RATE: 74 BPM | BODY MASS INDEX: 25.62 KG/M2

## 2018-06-19 DIAGNOSIS — K21.9 GASTROESOPHAGEAL REFLUX DISEASE, ESOPHAGITIS PRESENCE NOT SPECIFIED: Primary | ICD-10-CM

## 2018-06-19 DIAGNOSIS — R74.8 ELEVATED LIVER ENZYMES: ICD-10-CM

## 2018-06-19 DIAGNOSIS — K52.9 GASTROENTERITIS: ICD-10-CM

## 2018-06-19 PROBLEM — N52.9 ERECTILE DYSFUNCTION: Status: ACTIVE | Noted: 2017-01-11

## 2018-06-19 PROBLEM — G25.81 RESTLESS LEGS SYNDROME: Status: ACTIVE | Noted: 2017-02-20

## 2018-06-19 PROCEDURE — 99284 EMERGENCY DEPT VISIT MOD MDM: CPT

## 2018-06-19 PROCEDURE — 99213 OFFICE O/P EST LOW 20 MIN: CPT | Performed by: INTERNAL MEDICINE

## 2018-06-19 RX ORDER — ONDANSETRON 4 MG/1
4 TABLET, ORALLY DISINTEGRATING ORAL EVERY 8 HOURS PRN
Qty: 10 TABLET | Refills: 0 | Status: SHIPPED | OUTPATIENT
Start: 2018-06-19 | End: 2019-03-11

## 2018-06-19 NOTE — ED PROVIDER NOTES
History  Chief Complaint   Patient presents with    Diarrhea     PT reports "not feeling well yesterday, N/V/D, I cant hold anything down  Took immodium about an hour ago"     45-year-old male presents chief complaint of diffuse crampy abdominal pain associated with nausea, vomiting and diarrhea  Onset was yesterday shortly after eating some shrimp and rice  Patient states that his wife ate similar food and did not suffer any symptoms  No fevers or chills  Patient reports he had similar symptoms in December of last year and was good to [facility] after 2 bags of normal saline  Patient appears in mild distress but does not appear toxic  History provided by:  Patient and medical records   used: No    Diarrhea   Quality:  Copious  Severity:  Moderate  Onset quality:  Sudden  Number of episodes:  Multiple  Duration:  2 days  Timing:  Constant  Progression:  Unchanged  Relieved by:  Nothing  Worsened by:  Nothing  Ineffective treatments:  None tried  Associated symptoms: abdominal pain (diffuse crampy) and vomiting    Associated symptoms: no chills, no diaphoresis and no fever    Vomiting   Associated symptoms: abdominal pain (diffuse crampy) and diarrhea    Associated symptoms: no chills and no fever        Prior to Admission Medications   Prescriptions Last Dose Informant Patient Reported? Taking?    RABEprazole (ACIPHEX) 20 MG tablet   No Yes   Sig: Take 1 tablet (20 mg total) by mouth daily   dicyclomine (BENTYL) 20 mg tablet   No Yes   Sig: Take 1 tablet by mouth 3 (three) times a day as needed (crampy abd pain)   dicyclomine (BENTYL) 20 mg tablet   No Yes   Sig: Take 1 tablet by mouth 2 (two) times a day   diphenhydrAMINE-acetaminophen (TYLENOL PM EXTRA STRENGTH)  MG TABS   No Yes   Sig: Take 1 tablet by mouth daily   ibuprofen (MOTRIN) 800 mg tablet   No Yes   Sig: TAKE 1 TABLET (800 MG TOTAL) BY MOUTH EVERY 8 (EIGHT) HOURS AS NEEDED FOR MILD PAIN   ondansetron (ZOFRAN-ODT) 4 mg disintegrating tablet   No No   Sig: Take 1 tablet by mouth every 8 (eight) hours as needed for nausea or vomiting for up to 7 days   ondansetron (ZOFRAN-ODT) 4 mg disintegrating tablet   No No   Sig: Take 1 tablet by mouth every 8 (eight) hours as needed for nausea or vomiting for up to 7 days   pramipexole (MIRAPEX) 0 125 mg tablet   No Yes   Sig: Take 2 tabs at bedtime      Facility-Administered Medications: None       Past Medical History:   Diagnosis Date    GERD (gastroesophageal reflux disease)     Umbilical hernia     last assessed 12/28/15       Past Surgical History:   Procedure Laterality Date    ANKLE SURGERY      closed treatment of trimalleolar ankle fracture, last assessed 12/28/15    IA REPAIR UMBILICAL RWKB,1+V/M,YSZIM N/A 2/1/2016    Procedure: UMBILICAL HERNIA REPAIR ;  repaired with mesh Surgeon: Lina Keys DO;  Location: [facility];  Service: General       History reviewed  No pertinent family history  I have reviewed and agree with the history as documented  Social History   Substance Use Topics    Smoking status: Current Every Day Smoker     Packs/day: 0 50     Types: Cigarettes    Smokeless tobacco: Never Used      Comment: current some day per [employer]    Alcohol use No      Comment: drinks of friday and saturday several drinks each night        Review of Systems   Constitutional: Negative for chills, diaphoresis and fever  Respiratory: Negative for shortness of breath  Cardiovascular: Negative for chest pain and palpitations  Gastrointestinal: Positive for abdominal pain (diffuse crampy), diarrhea, nausea and vomiting  Negative for constipation  Genitourinary: Negative for dysuria and frequency  Skin: Negative for rash  All other systems reviewed and are negative  Physical Exam  Physical Exam   Constitutional: He is oriented to person, place, and time  He appears well-developed and well-nourished  He appears distressed (mild)     HENT:   Head: Normocephalic and atraumatic  Eyes: EOM are normal  Pupils are equal, round, and reactive to light  Neck: Normal range of motion  No JVD present  Cardiovascular: Normal rate, regular rhythm, normal heart sounds and intact distal pulses  Exam reveals no gallop and no friction rub  No murmur heard  Pulmonary/Chest: Effort normal and breath sounds normal  No respiratory distress  He has no wheezes  He has no rales  He exhibits no tenderness  Abdominal: He exhibits no distension  There is no tenderness  There is no rebound and no guarding  Musculoskeletal: Normal range of motion  He exhibits no tenderness  Neurological: He is alert and oriented to person, place, and time  Skin: Skin is warm and dry  Psychiatric: He has a normal mood and affect  His behavior is normal  Judgment and thought content normal    Nursing note and vitals reviewed        Vital Signs  ED Triage Vitals [06/18/18 2040]   Temperature Pulse Respirations Blood Pressure SpO2   98 3 °F (36 8 °C) 86 16 141/71 97 %      Temp Source Heart Rate Source Patient Position - Orthostatic VS BP Location FiO2 (%)   Oral Monitor Lying Left arm --      Pain Score       --           Vitals:    06/18/18 2230 06/18/18 2300 06/18/18 2330 06/19/18 0000   BP: 145/90 132/88 125/82 121/80   Pulse: 70 66 66 74   Patient Position - Orthostatic VS: Lying Lying Lying Lying       Visual Acuity      ED Medications  Medications   sodium chloride 0 9 % bolus 1,000 mL (1,000 mL Intravenous New Bag 6/18/18 2312)   sodium chloride 0 9 % bolus 1,000 mL (0 mL Intravenous Stopped 6/18/18 2312)   ondansetron (ZOFRAN) injection 4 mg (4 mg Intravenous Given 6/18/18 2225)       Diagnostic Studies  Results Reviewed     Procedure Component Value Units Date/Time    Comprehensive metabolic panel [[id number]]  (Abnormal) Collected:  06/18/18 2222    Lab Status:  Final result Specimen:  Blood from Arm, Left Updated:  06/18/18 2247     Sodium 136 mmol/L      Potassium 4 1 mmol/L Chloride 99 (L) mmol/L      CO2 24 mmol/L      Anion Gap 13 mmol/L      BUN 19 mg/dL      Creatinine 1 06 mg/dL      Glucose 119 mg/dL      Calcium 9 6 mg/dL      AST 31 U/L      ALT 60 U/L      Alkaline Phosphatase 118 (H) U/L      Total Protein 9 1 (H) g/dL      Albumin 4 8 g/dL      Total Bilirubin 1 60 (H) mg/dL      eGFR 86 ml/min/1 73sq m     Narrative:         National Kidney Disease Education Program recommendations are as follows:  GFR calculation is accurate only with a steady state creatinine  Chronic Kidney disease less than 60 ml/min/1 73 sq  meters  Kidney failure less than 15 ml/min/1 73 sq  meters      Lipase [[id number]]  (Normal) Collected:  06/18/18 2222    Lab Status:  Final result Specimen:  Blood from Arm, Left Updated:  06/18/18 2247     Lipase 112 u/L     CBC and differential [[id number]]  (Abnormal) Collected:  06/18/18 2222    Lab Status:  Final result Specimen:  Blood from Arm, Left Updated:  06/18/18 2228     WBC 14 35 (H) Thousand/uL      RBC 5 91 (H) Million/uL      Hemoglobin 16 5 g/dL      Hematocrit 47 7 %      MCV 81 (L) fL      MCH 27 9 pg      MCHC 34 6 g/dL      RDW 13 5 %      MPV 8 8 (L) fL      Platelets 055 Thousands/uL      Neutrophils Relative 89 (H) %      Lymphocytes Relative 6 (L) %      Monocytes Relative 5 %      Eosinophils Relative 0 %      Basophils Relative 0 %      Neutrophils Absolute 12 80 (H) Thousands/µL      Lymphocytes Absolute 0 86 Thousands/µL      Monocytes Absolute 0 66 Thousand/µL      Eosinophils Absolute 0 01 Thousand/µL      Basophils Absolute 0 02 Thousands/µL                  CT abdomen pelvis with contrast    (Results Pending)              Procedures  Procedures       Phone Contacts  ED Phone Contact    ED Course                               MDM  Number of Diagnoses or Management Options  Elevated LFTs: new and requires workup  Gastroenteritis: new and requires workup  Diagnosis management comments: 43 y o  male presents with chief complaint of epigastric abdominal pain associated with nausea and vomiting  Differential: gastritis, gastroenteritis, pancreatitis, cholecystitis, choledocholithiasis, mesenteric adenitis, non specific abdominal pain    Plan: cbc, cmp, urinalysis, lipase, symptom control +/- ct scan         Amount and/or Complexity of Data Reviewed  Clinical lab tests: ordered and reviewed    Risk of Complications, Morbidity, and/or Mortality  Presenting problems: high  Diagnostic procedures: high  Management options: high  General comments: Patient is refusing the CT scan of his abdomen  He understands that it is possible that he has an infection in his gallbladder and that this could cause significant morbidity and even death  He would like to be discharged and states he will be seen by his family physician  He will return here if his symptoms worsen  He believes his bloodwork abnormality is likely due to significant drinking over the weekend  Patient Progress  Patient progress: stable    CritCare Time    Disposition  Final diagnoses:   Gastroenteritis   Elevated LFTs     Time reflects when diagnosis was documented in both MDM as applicable and the Disposition within this note     Time User Action Codes Description Comment    6/19/2018 12:00 AM Patricia ERNANDEZ Add [K52 9] Gastroenteritis     6/19/2018 12:01 AM Jacquie Maradiaga Add [R79 89] Elevated LFTs       ED Disposition     ED Disposition Condition Comment    Discharge  Otf Em discharge to home/self care      Condition at discharge: Good        Follow-up Information     Follow up With Specialties Details Why Gia 124, DO Internal Medicine Schedule an appointment as soon as possible for a visit As needed [address]  [address] [place] [address]  [phone]            Patient's Medications   Discharge Prescriptions    ONDANSETRON (ZOFRAN-ODT) 4 MG DISINTEGRATING TABLET    Take 1 tablet (4 mg total) by mouth every 8 (eight) hours as needed for nausea or vomiting for up to 30 days       Start Date: 6/19/2018 End Date: 7/19/2018       Order Dose: 4 mg       Quantity: 10 tablet    Refills: 0     No discharge procedures on file      ED Provider  Electronically Signed by           Zarina Venegas MD  06/19/18 5982

## 2018-06-19 NOTE — PROGRESS NOTES
Assessment/Plan:     Diagnoses and all orders for this visit:    Gastroesophageal reflux disease, esophagitis presence not specified  Comments:  tkaing aciphex with relief and no side effects    Gastroenteritis  Comments:  feeling better s/p IV fluids in ER, declines  imaging in ER and now & for elevated LFTs  re-check LFTs in few weeks and precautions advised    Elevated liver enzymes  Comments:  re-check as above  Orders:  -     Hepatic function panel; Future          Subjective:      Patient ID: Bassem Rodriguez is a 43 y o  male  HPI     Here for follow up, reviewed meds with patient  Taking aciphex for reflux but not every day with relief and no side effects  Off probation and living at home, back to drinking etoh  Went to ER yesterday for N/V and treated with IV fluids, declined further imaging in ER and again today  Feeling at least 50% improved since yesterday  Denies any other complaints  Past Medical History:   Diagnosis Date    GERD (gastroesophageal reflux disease)     Umbilical hernia     last assessed 12/28/15     Vitals:    06/19/18 1555   BP: 104/72   Pulse: 74   Resp: 16   SpO2: 98%   Weight: 78 5 kg (173 lb)   Height: 5' 9" (1 753 m)     Body mass index is 25 55 kg/m²      Current Outpatient Prescriptions:     dicyclomine (BENTYL) 20 mg tablet, Take 1 tablet by mouth 3 (three) times a day as needed (crampy abd pain), Disp: 20 tablet, Rfl: 0    dicyclomine (BENTYL) 20 mg tablet, Take 1 tablet by mouth 2 (two) times a day, Disp: 20 tablet, Rfl: 0    diphenhydrAMINE-acetaminophen (TYLENOL PM EXTRA STRENGTH)  MG TABS, Take 1 tablet by mouth daily, Disp: 30 tablet, Rfl: 0    ibuprofen (MOTRIN) 800 mg tablet, TAKE 1 TABLET (800 MG TOTAL) BY MOUTH EVERY 8 (EIGHT) HOURS AS NEEDED FOR MILD PAIN, Disp: 60 tablet, Rfl: 0    ondansetron (ZOFRAN-ODT) 4 mg disintegrating tablet, Take 1 tablet (4 mg total) by mouth every 8 (eight) hours as needed for nausea or vomiting for up to 30 days, Disp: 10 tablet, Rfl: 0    pramipexole (MIRAPEX) 0 125 mg tablet, Take 2 tabs at bedtime, Disp: 60 tablet, Rfl: 3    RABEprazole (ACIPHEX) 20 MG tablet, Take 1 tablet (20 mg total) by mouth daily, Disp: 90 tablet, Rfl: 0    ondansetron (ZOFRAN-ODT) 4 mg disintegrating tablet, Take 1 tablet by mouth every 8 (eight) hours as needed for nausea or vomiting for up to 7 days, Disp: 10 tablet, Rfl: 0    ondansetron (ZOFRAN-ODT) 4 mg disintegrating tablet, Take 1 tablet by mouth every 8 (eight) hours as needed for nausea or vomiting for up to 7 days, Disp: 20 tablet, Rfl: 0  No current facility-administered medications for this visit  No Known Allergies      Review of Systems   Constitutional: Negative for fever  HENT: Negative for congestion  Eyes: Negative for visual disturbance  Respiratory: Negative for shortness of breath  Cardiovascular: Negative for chest pain  Gastrointestinal: Negative for abdominal pain  Genitourinary: Negative for difficulty urinating  Neurological: Negative for headaches  Psychiatric/Behavioral: Negative for dysphoric mood  Objective:      /72   Pulse 74   Resp 16   Ht 5' 9" (1 753 m)   Wt 78 5 kg (173 lb)   SpO2 98%   BMI 25 55 kg/m²          Physical Exam   Constitutional: He is oriented to person, place, and time  He appears well-developed and well-nourished  HENT:   Head: Normocephalic and atraumatic  Eyes: Pupils are equal, round, and reactive to light  Cardiovascular: Normal rate, regular rhythm and normal heart sounds  No murmur heard  Pulmonary/Chest: Effort normal and breath sounds normal  He has no wheezes  He has no rales  Abdominal: Soft  Bowel sounds are normal  There is no tenderness  Musculoskeletal: He exhibits no edema  Lymphadenopathy:     He has no cervical adenopathy  Neurological: He is alert and oriented to person, place, and time  Psychiatric: He has a normal mood and affect   His behavior is normal    Vitals reviewed

## 2018-06-19 NOTE — ED NOTES
PT approached registrations asking for something to drink  Registration asked RN if water could be given  RN s/w PT stated that a room was being cleaned and asking for water could be addressed to the DR, because of N/V/D       April Janine [place] RN  06/18/18 5431

## 2018-06-19 NOTE — DISCHARGE INSTRUCTIONS
Gastroenteritis   AMBULATORY CARE:   Gastroenteritis , or stomach flu, is an infection of the stomach and intestines  It is caused by bacteria, parasites, or viruses  Common symptoms include the following:   · Diarrhea or gas    · Nausea, vomiting, or poor appetite    · Abdominal cramps, pain, or gurgling    · Fever    · Tiredness or weakness    · Headaches or muscle aches with any of the above symptoms  Call 911 for any of the following:   · You have trouble breathing or a very fast pulse  Seek care immediately if:   · You see blood in your diarrhea  · You cannot stop vomiting  · You have not urinated for 12 hours  · You feel like you are going to faint  Contact your healthcare provider if:   · You have a fever  · You continue to vomit or have diarrhea, even after treatment  · You see worms in your diarrhea  · Your mouth or eyes are dry  You are not urinating as much or as often  · You have questions or concerns about your condition or care  Treatment for gastroenteritis  may include medicines to slow or stop your diarrhea or vomiting  You may also need medicines to treat an infection caused by bacteria or a parasite  Manage your symptoms:   · Drink liquids as directed  Ask your healthcare provider how much liquid to drink each day, and which liquids are best for you  You may also need to drink an oral rehydration solution (ORS)  An ORS has the right amounts of sugar, salt, and minerals in water to replace body fluids  · Eat bland foods  When you feel hungry, begin eating soft, bland foods  Examples are bananas, clear soup, potatoes, and applesauce  Do not have dairy products, alcohol, sugary drinks, or drinks with caffeine until you feel better  · Rest as much as possible  Slowly start to do more each day when you begin to feel better  Prevent the spread of germs:  Gastroenteritis can spread easily   Keep yourself, your family, and your surroundings clean to help prevent the spread of gastroenteritis:  · Wash your hands often  Use soap and water  Wash your hands after you use the bathroom, change a child's diapers, or sneeze  Wash your hands before you prepare or eat food  · Clean surfaces and do laundry often  Wash your clothes and towels separately from the rest of the laundry  Clean surfaces in your home with antibacterial  or bleach  · Clean food thoroughly and cook safely  Wash raw vegetables before you cook  Cook meat, fish, and eggs fully  Do not use the same dishes for raw meat as you do for other foods  Refrigerate any leftover food immediately  · Be aware when you camp or travel  Drink only clean water  Do not drink from rivers or lakes unless you purify or boil the water first  When you travel, drink bottled water and do not add ice  Do not eat fruit that has not been peeled  Do not eat raw fish or meat that is not fully cooked  Follow up with your healthcare provider as directed:  Write down your questions so you remember to ask them during your visits  © 2017 [address] [facility] Information is for End User's use only and may not be sold, redistributed or otherwise used for commercial purposes  All illustrations and images included in CareNotes® are the copyrighted property of A D A M , Inc  or [employer]  The above information is an  only  It is not intended as medical advice for individual conditions or treatments  Talk to your doctor, nurse or pharmacist before following any medical regimen to see if it is safe and effective for you

## 2018-08-16 ENCOUNTER — OFFICE VISIT (OUTPATIENT)
Dept: INTERNAL MEDICINE CLINIC | Facility: CLINIC | Age: 43
End: 2018-08-16
Payer: COMMERCIAL

## 2018-08-16 ENCOUNTER — HOSPITAL ENCOUNTER (OUTPATIENT)
Dept: RADIOLOGY | Facility: HOSPITAL | Age: 43
Discharge: HOME | End: 2018-08-16

## 2018-08-16 VITALS
SYSTOLIC BLOOD PRESSURE: 126 MMHG | HEIGHT: 69 IN | WEIGHT: 171.8 LBS | BODY MASS INDEX: 25.45 KG/M2 | TEMPERATURE: 97.7 F | OXYGEN SATURATION: 98 % | DIASTOLIC BLOOD PRESSURE: 78 MMHG | HEART RATE: 67 BPM | RESPIRATION RATE: 18 BRPM

## 2018-08-16 DIAGNOSIS — M25.551 POSTERIOR PAIN OF HIP, RIGHT: ICD-10-CM

## 2018-08-16 DIAGNOSIS — T14.8XXA BRUISE OF MUSCLE: ICD-10-CM

## 2018-08-16 DIAGNOSIS — S39.012A STRAIN OF LUMBAR REGION, INITIAL ENCOUNTER: ICD-10-CM

## 2018-08-16 DIAGNOSIS — K21.9 GASTROESOPHAGEAL REFLUX DISEASE, ESOPHAGITIS PRESENCE NOT SPECIFIED: Primary | ICD-10-CM

## 2018-08-16 PROBLEM — M77.12 LATERAL EPICONDYLITIS OF BOTH ELBOWS: Status: ACTIVE | Noted: 2017-02-24

## 2018-08-16 PROBLEM — M77.01 MEDIAL EPICONDYLITIS OF RIGHT ELBOW: Status: RESOLVED | Noted: 2017-03-30 | Resolved: 2018-08-16

## 2018-08-16 PROBLEM — M77.11 LATERAL EPICONDYLITIS OF BOTH ELBOWS: Status: ACTIVE | Noted: 2017-02-24

## 2018-08-16 PROBLEM — M77.01 MEDIAL EPICONDYLITIS OF RIGHT ELBOW: Status: ACTIVE | Noted: 2017-03-30

## 2018-08-16 PROBLEM — M77.11 LATERAL EPICONDYLITIS OF BOTH ELBOWS: Status: RESOLVED | Noted: 2017-02-24 | Resolved: 2018-08-16

## 2018-08-16 PROBLEM — M77.02 MEDIAL EPICONDYLITIS OF LEFT ELBOW: Status: RESOLVED | Noted: 2017-03-30 | Resolved: 2018-08-16

## 2018-08-16 PROBLEM — M77.12 LATERAL EPICONDYLITIS OF BOTH ELBOWS: Status: RESOLVED | Noted: 2017-02-24 | Resolved: 2018-08-16

## 2018-08-16 PROBLEM — M77.02 MEDIAL EPICONDYLITIS OF LEFT ELBOW: Status: ACTIVE | Noted: 2017-03-30

## 2018-08-16 PROCEDURE — 99214 OFFICE O/P EST MOD 30 MIN: CPT | Performed by: INTERNAL MEDICINE

## 2018-08-16 PROCEDURE — 3008F BODY MASS INDEX DOCD: CPT | Performed by: INTERNAL MEDICINE

## 2018-08-16 RX ORDER — RABEPRAZOLE SODIUM 20 MG/1
20 TABLET, DELAYED RELEASE ORAL DAILY
Qty: 90 TABLET | Refills: 0 | Status: SHIPPED | OUTPATIENT
Start: 2018-08-16 | End: 2019-01-14 | Stop reason: SDUPTHER

## 2018-08-16 NOTE — PROGRESS NOTES
Assessment/Plan:     Diagnoses and all orders for this visit:    Gastroesophageal reflux disease, esophagitis presence not specified  Comments:  c/w aciphex, refuses EGD  Orders:  -     RABEprazole (ACIPHEX) 20 MG tablet; Take 1 tablet (20 mg total) by mouth daily    Strain of lumbar region, initial encounter  Comments:  rest/analgesics/PT  Orders:  -     XR spine lumbar minimum 4 views non injury; Future  -     Ambulatory referral to Physical Therapy; Future    Posterior pain of hip, right  Comments:  XR 2nd to ongoing pain  Orders:  -     XR hip/pelv 2-3 vws right if performed; Future  -     Ambulatory referral to Physical Therapy; Future    Bruise of muscle  Comments:  monitor for resolution over next 1-2 weeks          Subjective:      Patient ID: Otf Em is a 43 y o  male  HPI    Here for follow up  Reviewed meds with patient  Taking aciphex for VAHID and declined on multiple occassions to see GI for EGD(had iron def anemia last year)  Here with c/o bruise on right abd wall with tenderness and right hip pain  May have hit his right abd wall accidentally while at work, developed a bruise which has improved but tenderness and induration persists  Posterior right hip and low back pain(on same side) off/on for many months  Does lots of lifting at work & has been seeing chiropractor for adjustments which has helped(told his hip is 3/4 inch out of place)  No fall/injury or radicular sx  Pain mostly worse with changing positions  No prior back or hip problems  No other complaints  Past Medical History:   Diagnosis Date    GERD (gastroesophageal reflux disease)     Umbilical hernia     last assessed 12/28/15     Vitals:    08/16/18 1055   BP: 126/78   Pulse: 67   Resp: 18   Temp: 97 7 °F (36 5 °C)   TempSrc: Oral   SpO2: 98%   Weight: 77 9 kg (171 lb 12 8 oz)   Height: 5' 9" (1 753 m)     Body mass index is 25 37 kg/m²      Current Outpatient Prescriptions:     ibuprofen (MOTRIN) 800 mg tablet, TAKE 1 TABLET (800 MG TOTAL) BY MOUTH EVERY 8 (EIGHT) HOURS AS NEEDED FOR MILD PAIN, Disp: 60 tablet, Rfl: 0    RABEprazole (ACIPHEX) 20 MG tablet, Take 1 tablet (20 mg total) by mouth daily, Disp: 90 tablet, Rfl: 0    ondansetron (ZOFRAN-ODT) 4 mg disintegrating tablet, Take 1 tablet (4 mg total) by mouth every 8 (eight) hours as needed for nausea or vomiting for up to 30 days, Disp: 10 tablet, Rfl: 0  No Known Allergies      Review of Systems   Constitutional: Negative for fever  HENT: Negative for congestion  Eyes: Negative for visual disturbance  Respiratory: Negative for shortness of breath  Cardiovascular: Negative for chest pain  Gastrointestinal: Negative for abdominal pain  Genitourinary: Negative for difficulty urinating  Musculoskeletal: Positive for arthralgias and back pain  Neurological: Negative for headaches  Psychiatric/Behavioral: Negative for dysphoric mood  Objective:      /78   Pulse 67   Temp 97 7 °F (36 5 °C) (Oral)   Resp 18   Ht 5' 9" (1 753 m)   Wt 77 9 kg (171 lb 12 8 oz)   SpO2 98%   BMI 25 37 kg/m²          Physical Exam   Constitutional: He appears well-developed and well-nourished  HENT:   Head: Normocephalic and atraumatic  Mouth/Throat: Oropharynx is clear and moist    Eyes: Conjunctivae are normal  Pupils are equal, round, and reactive to light  Cardiovascular: Normal rate, regular rhythm and normal heart sounds  No murmur heard  Pulmonary/Chest: Effort normal and breath sounds normal  He has no wheezes  He has no rales  Abdominal: Soft  Bowel sounds are normal  There is no tenderness  Musculoskeletal: He exhibits no edema  No lumbar spinal or paraspinal tenderness  SLR  Positive on right but not on left  No hip pain with passive ROM b/l   Neurological: He is alert  Motor 5/5 LE b/l   Skin: Ecchymosis (healing on left lateral abd wall with local induration/tenderness(resolving hematoma?) approx 1 x 3cm in size) noted  Psychiatric: He has a normal mood and affect  His behavior is normal    Vitals reviewed

## 2019-01-14 DIAGNOSIS — K21.9 GASTROESOPHAGEAL REFLUX DISEASE, ESOPHAGITIS PRESENCE NOT SPECIFIED: ICD-10-CM

## 2019-01-14 RX ORDER — RABEPRAZOLE SODIUM 20 MG/1
20 TABLET, DELAYED RELEASE ORAL DAILY
Qty: 90 TABLET | Refills: 0 | Status: SHIPPED | OUTPATIENT
Start: 2019-01-14 | End: 2019-02-11 | Stop reason: SDUPTHER

## 2019-02-11 ENCOUNTER — TELEPHONE (OUTPATIENT)
Dept: UROLOGY | Facility: MEDICAL CENTER | Age: 44
End: 2019-02-11

## 2019-02-11 ENCOUNTER — OFFICE VISIT (OUTPATIENT)
Dept: INTERNAL MEDICINE CLINIC | Facility: CLINIC | Age: 44
End: 2019-02-11
Payer: COMMERCIAL

## 2019-02-11 VITALS
DIASTOLIC BLOOD PRESSURE: 74 MMHG | BODY MASS INDEX: 26.19 KG/M2 | HEART RATE: 62 BPM | OXYGEN SATURATION: 97 % | RESPIRATION RATE: 18 BRPM | WEIGHT: 176.8 LBS | HEIGHT: 69 IN | TEMPERATURE: 98.8 F | SYSTOLIC BLOOD PRESSURE: 126 MMHG

## 2019-02-11 DIAGNOSIS — R17 ELEVATED BILIRUBIN: ICD-10-CM

## 2019-02-11 DIAGNOSIS — K21.9 GASTROESOPHAGEAL REFLUX DISEASE, ESOPHAGITIS PRESENCE NOT SPECIFIED: Primary | ICD-10-CM

## 2019-02-11 DIAGNOSIS — Z13.6 ENCOUNTER FOR LIPID SCREENING FOR CARDIOVASCULAR DISEASE: ICD-10-CM

## 2019-02-11 DIAGNOSIS — A63.0 GENITAL WARTS: ICD-10-CM

## 2019-02-11 DIAGNOSIS — Z13.220 ENCOUNTER FOR LIPID SCREENING FOR CARDIOVASCULAR DISEASE: ICD-10-CM

## 2019-02-11 PROBLEM — F51.8 HYPNIC JERKS: Status: RESOLVED | Noted: 2017-04-26 | Resolved: 2019-02-11

## 2019-02-11 PROBLEM — F51.8 HYPNIC JERKS: Status: ACTIVE | Noted: 2017-04-26

## 2019-02-11 PROCEDURE — 99214 OFFICE O/P EST MOD 30 MIN: CPT | Performed by: INTERNAL MEDICINE

## 2019-02-11 RX ORDER — RABEPRAZOLE SODIUM 20 MG/1
20 TABLET, DELAYED RELEASE ORAL DAILY
Qty: 90 TABLET | Refills: 1 | Status: SHIPPED | OUTPATIENT
Start: 2019-02-11 | End: 2019-10-03 | Stop reason: SDUPTHER

## 2019-02-11 NOTE — TELEPHONE ENCOUNTER
New Patient Intake form:    Complaint/Diagnosis:  Genital Warts    Insurance:[employer]  History of Cancer: no    Previous urologist: no    Outside Testing/where:n/a    If yes, what kind:    Records Requested/Where:[facility]  Preferred location:[place]

## 2019-02-11 NOTE — PROGRESS NOTES
Assessment/Plan:     Diagnoses and all orders for this visit:    Gastroesophageal reflux disease, esophagitis presence not specified  Comments:  symptoms controlled/refused EGD in past   c/w PPI Prn  Orders:  -     RABEprazole (ACIPHEX) 20 MG tablet; Take 1 tablet (20 mg total) by mouth daily    Elevated bilirubin  Comments:  did not complete f/u BW after last OV  BW now and advised alcohol in moderation  Orders:  -     Comprehensive metabolic panel; Future  -     Lipase; Future    Encounter for lipid screening for cardiovascular disease  -     Lipid Panel with Direct LDL reflex; Future    Genital warts  Comments:  eval with urologist advised, ref provided  Orders:  -     Ambulatory referral to Urology; Future          Subjective:      Patient ID: Caty Cates is a 37 y o  male  HPI    Here for 6 mos follow up  Did not complete back, hip x-rays or BW as advised  Feeling well overall, takes PPI prn and acid reflux remains well controlled  Still occ smoker, declines flu and pneumonia vaccines  Works [employer], in a relationship  [facility] for chronic pain, mostly in ankle where he had a break and subsequent surgery  Has had genital warts for years, had cryo-ablated by derm in past with temporary relief but was quite painful  We discussed contagious nature of genital warts/HPV and toro verbalized understanding  No other complaints  Past Medical History:   Diagnosis Date    Umbilical hernia     last assessed 12/28/15     Vitals:    02/11/19 1559   BP: 126/74   Pulse: 62   Resp: 18   Temp: 98 8 °F (37 1 °C)   TempSrc: Oral   SpO2: 97%   Weight: 80 2 kg (176 lb 12 8 oz)   Height: 5' 9" (1 753 m)     Body mass index is 26 11 kg/m²      Current Outpatient Medications:     ibuprofen (MOTRIN) 800 mg tablet, TAKE 1 TABLET (800 MG TOTAL) BY MOUTH EVERY 8 (EIGHT) HOURS AS NEEDED FOR MILD PAIN, Disp: 60 tablet, Rfl: 0    RABEprazole (ACIPHEX) 20 MG tablet, Take 1 tablet (20 mg total) by mouth daily, Disp: 90 tablet, Rfl: 1    ondansetron (ZOFRAN-ODT) 4 mg disintegrating tablet, Take 1 tablet (4 mg total) by mouth every 8 (eight) hours as needed for nausea or vomiting for up to 30 days, Disp: 10 tablet, Rfl: 0  No Known Allergies      Review of Systems   Constitutional: Negative for fever  HENT: Negative for congestion  Eyes: Negative for visual disturbance  Respiratory: Negative for shortness of breath  Cardiovascular: Negative for chest pain  Gastrointestinal: Negative for abdominal pain  Genitourinary: Negative for dysuria  Musculoskeletal: Negative for arthralgias  Skin: Positive for rash  Neurological: Negative for headaches  Psychiatric/Behavioral: Negative for dysphoric mood  Objective:      /74   Pulse 62   Temp 98 8 °F (37 1 °C) (Oral)   Resp 18   Ht 5' 9" (1 753 m)   Wt 80 2 kg (176 lb 12 8 oz)   SpO2 97%   BMI 26 11 kg/m²          Physical Exam   Constitutional: He appears well-developed and well-nourished  HENT:   Head: Normocephalic and atraumatic  Eyes: Pupils are equal, round, and reactive to light  Cardiovascular: Normal rate, regular rhythm and normal heart sounds  No murmur heard  Pulmonary/Chest: Effort normal and breath sounds normal  He has no wheezes  He has no rales  Abdominal: Soft  Bowel sounds are normal  There is no tenderness  Genitourinary:   Genitourinary Comments: 2-3 verrocous wart-like lesions on left scrotum   Musculoskeletal: He exhibits no edema  Neurological: He is alert  Psychiatric: He has a normal mood and affect  His behavior is normal    Vitals reviewed

## 2019-02-11 NOTE — TELEPHONE ENCOUNTER
Patient mailed new patient paperwork    Patient has appointment in [facility] on 03/13/2019 at 5:15 pm

## 2019-03-11 PROBLEM — A63.0 GENITAL WARTS: Status: ACTIVE | Noted: 2019-03-11

## 2019-03-11 NOTE — PROGRESS NOTES
Problem List Items Addressed This Visit        Musculoskeletal and Integument    Genital warts     Patient with a history of recurrent genital condyloma, he currently has over 10 lesions, all of which are less than 1 centimeter in size  I spoke to him about the pathophysiology of this disorder, the need for consideration of vaccination for his children, as well as counseling his partner on the potential for genital warts  She has been attended to by her GYN physician and does not currently have these condyloma  He previously has had ablation procedures by a dermatologist, but was displeased with the experience of having this done while awake  I spoke with him about excision of condyloma and use of CO2 laser for ablation of genital condyloma and discussed with him the risks of infection, bleeding, pain, damage to surrounding structures, need for additional procedures, risk of failure of the procedure, risk of anesthesia, risk of positioning complications including neurapraxia, chronic pain, and paralysis as well as risk of rhabdomyolysis and compartment syndrome  Risk of deep venous thrombosis and venous thromboembolism as well as pneumonia and other potential unpredictable complications were also discussed with the patient  Plan:  Proceed to excision and laser destruction of multiple genital condylomata         Relevant Orders    Case request operating room: EXCISION CONDYLOMA PERINEAL (PENILE/VAGINAL) WITH LASER CO2, excision of condylomata (Completed)       Genitourinary    Erectile dysfunction - Primary            Discussion:  Patient denies a history of easy bruising and bleeding, endorses good physical fitness an exercise tolerance    No history of chest pain or shortness of breath or cardiovascular or vascular or neurologic disease      Assessment and plan:       Please see problem oriented charting for the assessment plan of today's urological complaints    Ivan Pierce MD      Chief Complaint     Chief Complaint   Patient presents with    Genital Warts         History of Present Illness     Sigrid Rosas is a 37 y o  gentleman that works for [employer] that is referred in consultation by Dr Lola Cevallos for genital condyloma  A copy of today's consultation is being sent to the referring provider in the name of continuity of care  The patient states that for many years he has had recurrent genital condyloma, that are painful and make it difficult for him to perform sexually as well as to shave his genitals  He has previously seen a dermatologist that perform some freezing procedures, but he did not like the way that this felt and he wants more definitive management  He identifies no aggravating or alleviating factors, he has been honest with his female partner regarding this finding and the need for follow-up with her health care provider, and she has done so  The patient states that he has had numerous female partners throughout his lifetime, and no male sexual partners  He is voiding well  On review of systems today he denies dysuria, incontinence, hesitancy of urination, gross hematuria, urgency, nocturia, he feels empty after voiding and stream quality is good  No other complaints today  The following portions of the patient's history were reviewed and updated as appropriate: allergies, current medications, past family history, past medical history, past social history, past surgical history and problem list     Detailed Urologic History     - please refer to HPI    Review of Systems     Review of Systems   Constitutional: Negative  HENT: Negative  Eyes: Negative  Respiratory: Negative  Cardiovascular: Negative  Gastrointestinal: Negative  Endocrine: Negative  Genitourinary:        As per HPI   Musculoskeletal: Negative  Skin: Negative  Allergic/Immunologic: Negative  Neurological: Negative  Hematological: Negative      Psychiatric/Behavioral: Negative  Allergies     No Known Allergies    Physical Exam     Physical Exam   Constitutional: He is oriented to person, place, and time  He appears well-developed and well-nourished  No distress  HENT:   Head: Normocephalic and atraumatic  Right Ear: External ear normal    Left Ear: External ear normal    Nose: Nose normal    Eyes: Pupils are equal, round, and reactive to light  Conjunctivae and EOM are normal  Right eye exhibits no discharge  Left eye exhibits no discharge  No scleral icterus  Neck: Normal range of motion  Neck supple  No tracheal deviation present  No thyromegaly present  Cardiovascular: Regular rhythm and intact distal pulses  Pulmonary/Chest: Effort normal  No stridor  No respiratory distress  He has no wheezes  Abdominal: Soft  He exhibits no distension and no mass  There is no tenderness  There is no rebound and no guarding  No hernia  Genitourinary:         Musculoskeletal: He exhibits no edema, tenderness or deformity  Neurological: He is alert and oriented to person, place, and time  No cranial nerve deficit  Coordination normal    Skin: Skin is warm and dry  No rash noted  He is not diaphoretic  No erythema  No pallor  Psychiatric: He has a normal mood and affect  His behavior is normal  Judgment and thought content normal    Nursing note and vitals reviewed            Vital Signs  Vitals:    03/13/19 1717   BP: 118/86   BP Location: Left arm   Patient Position: Sitting   Cuff Size: Standard   Pulse: 80   Weight: 80 2 kg (176 lb 12 8 oz)   Height: 5' 9" (1 753 m)         Current Medications       Current Outpatient Medications:     ibuprofen (MOTRIN) 800 mg tablet, TAKE 1 TABLET (800 MG TOTAL) BY MOUTH EVERY 8 (EIGHT) HOURS AS NEEDED FOR MILD PAIN, Disp: 60 tablet, Rfl: 0    RABEprazole (ACIPHEX) 20 MG tablet, Take 1 tablet (20 mg total) by mouth daily, Disp: 90 tablet, Rfl: 1      Active Problems     Patient Active Problem List   Diagnosis    Acid reflux  Multiple joint pain    Anxiety    Erectile dysfunction    Restless legs syndrome    External hemorrhoids    Closed fracture of proximal phalanx of left hand    Genital warts         Past Medical History     Past Medical History:   Diagnosis Date    Umbilical hernia     last assessed 12/28/15         Surgical History     Past Surgical History:   Procedure Laterality Date    ANKLE SURGERY      closed treatment of trimalleolar ankle fracture, last assessed 12/28/15    AL REPAIR UMBILICAL YWJI,5+L/U,IYOWM N/A 2/1/2016    Procedure: UMBILICAL HERNIA REPAIR ;  repaired with mesh Surgeon: Sho Waldron DO;  Location: [facility];  Service: General         Family History     Family History   Problem Relation Age of Onset    Alcohol abuse Neg Hx     Substance Abuse Neg Hx     Depression Neg Hx     Mental illness Neg Hx          Social History     Social History     Social History     Tobacco Use   Smoking Status Current Every Day Smoker    Packs/day: 0 50    Types: Cigarettes   Smokeless Tobacco Never Used   Tobacco Comment    current some day per [employer]         Pertinent Lab Values     Lab Results   Component Value Date    CREATININE 1 06 06/18/2018       No results found for: PSA          Pertinent Imaging      There is no pertinent urological imaging for my review

## 2019-03-11 NOTE — PATIENT INSTRUCTIONS
Genital Warts   WHAT YOU NEED TO KNOW:   What are genital warts? Genital warts are a sexually transmitted infection (STI) caused by the human papillomavirus (HPV)  Genital warts are growths that appear in or on the penis, vagina, or anus  Genital warts are spread during genital, anal, or oral sex  A woman can also pass them to a baby when she gives birth  What increases my risk for genital warts? · Not wearing protection during sex, such as a condom    · Multiple sex partners    · A weak immune system caused by other STIs, HIV, or cancer    · Smoking  What are the signs and symptoms of genital warts? Genital warts are flat or dome shaped and can be pink, red, or brown  As the warts grow, your skin may itch or burn  Warts can be painful if they grow together  Over time the warts may look like cauliflower  They may feel moist and rough when you touch them  How are genital warts diagnosed? Your healthcare provider will examine you and ask about your sexual activity  He will use a light to look at your penis, vagina, or anus  You may need any of the following tests:  · An acetic acid test  is when your healthcare provider puts a solution on the affected area  The solution turns the warts white  · A sample of one or more warts  may be removed and sent for tests  The tests may show if you have a higher risk of certain types of cancer, such as cervical cancer  · A Pap smear  may show if you have HPV or other cervical problems  A sample of cells from your cervix are collected and sent for testing  How are genital warts treated? Small genital warts may heal without treatment  In some cases, the warts can get bigger, or you may get more of them  Treatment can help prevent you from spreading warts to others and may help prevent cervical cancer in women  Treatment can also take away your symptoms and help you feel better   You may need any of the following:  · Medicines:      ¨ Immunomodulators  help strengthen your immune system and treat genital warts  ¨ Antiproliferatives  help stop genital warts from growing in size or increasing in number  ¨ Antivirals  help control and stop virus growth, such as HPV  · Procedures:      ¨ Cryotherapy  is used to freeze and destroy genital warts with liquid nitrogen  ¨ Electrocautery  is used to destroy genital warts with heat  ¨ A laser  may be used to remove larger or thicker genital warts  The laser uses heat to destroy the tissues around or near your warts  This treatment may help the areas heal without leaving scars  ¨ Surgery  with a scalpel, scissors, or other surgical tools may be used to remove the warts  How can I manage my symptoms? · Do not touch or scratch the warts  This can cause the infection to spread to other parts of your body  · Do not have sex while you are being treated for genital warts  Medicine used on your skin weakens condoms and diaphragms  You also risk spreading genital warts to your partner  · Get regular Pap smears  If you are a woman, this can help diagnose HPV and prevent the spread of the virus  How can I help prevent the spread of genital warts? · Tell your sexual partners that you are being treated for genital warts  They may also be infected and need treatment  · Get the HPV vaccine  The HPV vaccine is given at 5to 32years of age to help prevent cervical cancer and genital warts  Ask your healthcare provider for more information about this vaccine  When should I contact my healthcare provider? · Your genital warts return  · The skin that is being treated for genital warts is very painful or swollen  · You see or feel new warts on another part of your body  · You have questions or concerns about your condition or care  CARE AGREEMENT:   You have the right to help plan your care  Learn about your health condition and how it may be treated   Discuss treatment options with your caregivers to decide what care you want to receive  You always have the right to refuse treatment  The above information is an  only  It is not intended as medical advice for individual conditions or treatments  Talk to your doctor, nurse or pharmacist before following any medical regimen to see if it is safe and effective for you  © 2017 [employer] Information is for End User's use only and may not be sold, redistributed or otherwise used for commercial purposes  All illustrations and images included in CareNotes® are the copyrighted property of A D A M [employer]  or [employer]

## 2019-03-13 ENCOUNTER — OFFICE VISIT (OUTPATIENT)
Dept: UROLOGY | Facility: CLINIC | Age: 44
End: 2019-03-13
Payer: COMMERCIAL

## 2019-03-13 VITALS
DIASTOLIC BLOOD PRESSURE: 86 MMHG | HEIGHT: 69 IN | WEIGHT: 176.8 LBS | SYSTOLIC BLOOD PRESSURE: 118 MMHG | HEART RATE: 80 BPM | BODY MASS INDEX: 26.19 KG/M2

## 2019-03-13 DIAGNOSIS — A63.0 GENITAL WARTS: ICD-10-CM

## 2019-03-13 DIAGNOSIS — N52.9 ERECTILE DYSFUNCTION, UNSPECIFIED ERECTILE DYSFUNCTION TYPE: Primary | ICD-10-CM

## 2019-03-13 PROCEDURE — 99244 OFF/OP CNSLTJ NEW/EST MOD 40: CPT | Performed by: UROLOGY

## 2019-03-13 NOTE — ASSESSMENT & PLAN NOTE
Patient with a history of recurrent genital condyloma, he currently has over 10 lesions, all of which are less than 1 centimeter in size  I spoke to him about the pathophysiology of this disorder, the need for consideration of vaccination for his children, as well as counseling his partner on the potential for genital warts  She has been attended to by her GYN physician and does not currently have these condyloma  He previously has had ablation procedures by a dermatologist, but was displeased with the experience of having this done while awake  I spoke with him about excision of condyloma and use of CO2 laser for ablation of genital condyloma and discussed with him the risks of infection, bleeding, pain, damage to surrounding structures, need for additional procedures, risk of failure of the procedure, risk of anesthesia, risk of positioning complications including neurapraxia, chronic pain, and paralysis as well as risk of rhabdomyolysis and compartment syndrome  Risk of deep venous thrombosis and venous thromboembolism as well as pneumonia and other potential unpredictable complications were also discussed with the patient      Plan:  Proceed to excision and laser destruction of multiple genital condylomata

## 2019-03-13 NOTE — LETTER
March 13, 2019     Thao Waters DO  [address]  [facility]    Patient: Jerry Del Rosario   YOB: 1975   Date of Visit: 3/13/2019       Dear Dr Eloise Peters:    Thank you for referring Jerry Del Rosario to me for evaluation  Below are my notes for this consultation  If you have questions, please do not hesitate to call me  I look forward to following your patient along with you  Sincerely,        Candace Mohr MD        CC: No Recipients  Candace Mohr MD  3/13/2019  5:41 PM  Sign at close encounter       Problem List Items Addressed This Visit        Musculoskeletal and Integument    Genital warts     Patient with a history of recurrent genital condyloma, he currently has over 10 lesions, all of which are less than 1 centimeter in size  I spoke to him about the pathophysiology of this disorder, the need for consideration of vaccination for his children, as well as counseling his partner on the potential for genital warts  She has been attended to by her GYN physician and does not currently have these condyloma  He previously has had ablation procedures by a dermatologist, but was displeased with the experience of having this done while awake  I spoke with him about excision of condyloma and use of CO2 laser for ablation of genital condyloma and discussed with him the risks of infection, bleeding, pain, damage to surrounding structures, need for additional procedures, risk of failure of the procedure, risk of anesthesia, risk of positioning complications including neurapraxia, chronic pain, and paralysis as well as risk of rhabdomyolysis and compartment syndrome  Risk of deep venous thrombosis and venous thromboembolism as well as pneumonia and other potential unpredictable complications were also discussed with the patient      Plan:  Proceed to excision and laser destruction of multiple genital condylomata         Relevant Orders    Case request operating room: EXCISION CONDYLOMA PERINEAL (PENILE/VAGINAL) WITH LASER CO2, excision of condylomata (Completed)       Genitourinary    Erectile dysfunction - Primary            Discussion:  Patient denies a history of easy bruising and bleeding, endorses good physical fitness an exercise tolerance  No history of chest pain or shortness of breath or cardiovascular or vascular or neurologic disease      Assessment and plan:       Please see problem oriented charting for the assessment plan of today's urological complaints    Ivan Pierce MD      Chief Complaint     Chief Complaint   Patient presents with    Genital Warts         History of Present Illness     Jono Brock is a 37 y o  gentleman that works for [employer] that is referred in consultation by Dr John Paul Henson for genital condyloma  A copy of today's consultation is being sent to the referring provider in the name of continuity of care  The patient states that for many years he has had recurrent genital condyloma, that are painful and make it difficult for him to perform sexually as well as to shave his genitals  He has previously seen a dermatologist that perform some freezing procedures, but he did not like the way that this felt and he wants more definitive management  He identifies no aggravating or alleviating factors, he has been honest with his female partner regarding this finding and the need for follow-up with her health care provider, and she has done so  The patient states that he has had numerous female partners throughout his lifetime, and no male sexual partners  He is voiding well  On review of systems today he denies dysuria, incontinence, hesitancy of urination, gross hematuria, urgency, nocturia, he feels empty after voiding and stream quality is good  No other complaints today      The following portions of the patient's history were reviewed and updated as appropriate: allergies, current medications, past family history, past medical history, past social history, past surgical history and problem list     Detailed Urologic History     - please refer to HPI    Review of Systems     Review of Systems   Constitutional: Negative  HENT: Negative  Eyes: Negative  Respiratory: Negative  Cardiovascular: Negative  Gastrointestinal: Negative  Endocrine: Negative  Genitourinary:        As per HPI   Musculoskeletal: Negative  Skin: Negative  Allergic/Immunologic: Negative  Neurological: Negative  Hematological: Negative  Psychiatric/Behavioral: Negative  Allergies     No Known Allergies    Physical Exam     Physical Exam   Constitutional: He is oriented to person, place, and time  He appears well-developed and well-nourished  No distress  HENT:   Head: Normocephalic and atraumatic  Right Ear: External ear normal    Left Ear: External ear normal    Nose: Nose normal    Eyes: Pupils are equal, round, and reactive to light  Conjunctivae and EOM are normal  Right eye exhibits no discharge  Left eye exhibits no discharge  No scleral icterus  Neck: Normal range of motion  Neck supple  No tracheal deviation present  No thyromegaly present  Cardiovascular: Regular rhythm and intact distal pulses  Pulmonary/Chest: Effort normal  No stridor  No respiratory distress  He has no wheezes  Abdominal: Soft  He exhibits no distension and no mass  There is no tenderness  There is no rebound and no guarding  No hernia  Genitourinary:         Musculoskeletal: He exhibits no edema, tenderness or deformity  Neurological: He is alert and oriented to person, place, and time  No cranial nerve deficit  Coordination normal    Skin: Skin is warm and dry  No rash noted  He is not diaphoretic  No erythema  No pallor  Psychiatric: He has a normal mood and affect  His behavior is normal  Judgment and thought content normal    Nursing note and vitals reviewed            Vital Signs  Vitals:    03/13/19 1717   BP: 118/86   BP Location: Left arm   Patient Position: Sitting   Cuff Size: Standard   Pulse: 80   Weight: 80 2 kg (176 lb 12 8 oz)   Height: 5' 9" (1 753 m)         Current Medications       Current Outpatient Medications:     ibuprofen (MOTRIN) 800 mg tablet, TAKE 1 TABLET (800 MG TOTAL) BY MOUTH EVERY 8 (EIGHT) HOURS AS NEEDED FOR MILD PAIN, Disp: 60 tablet, Rfl: 0    RABEprazole (ACIPHEX) 20 MG tablet, Take 1 tablet (20 mg total) by mouth daily, Disp: 90 tablet, Rfl: 1      Active Problems     Patient Active Problem List   Diagnosis    Acid reflux    Multiple joint pain    Anxiety    Erectile dysfunction    Restless legs syndrome    External hemorrhoids    Closed fracture of proximal phalanx of left hand    Genital warts         Past Medical History     Past Medical History:   Diagnosis Date    Umbilical hernia     last assessed 12/28/15         Surgical History     Past Surgical History:   Procedure Laterality Date    ANKLE SURGERY      closed treatment of trimalleolar ankle fracture, last assessed 12/28/15    MT REPAIR UMBILICAL OUMY,8+A/M,DLNBS N/A 2/1/2016    Procedure: UMBILICAL HERNIA REPAIR ;  repaired with mesh Surgeon: Terra Cardenas DO;  [facility];  Service: General         Family History     Family History   Problem Relation Age of Onset    Alcohol abuse Neg Hx     Substance Abuse Neg Hx     Depression Neg Hx     Mental illness Neg Hx          Social History     Social History     Social History     Tobacco Use   Smoking Status Current Every Day Smoker    Packs/day: 0 50    Types: Cigarettes   Smokeless Tobacco Never Used   Tobacco Comment    current some day per [employer]         Pertinent Lab Values     Lab Results   Component Value Date    CREATININE 1 06 06/18/2018       No results found for: PSA          Pertinent Imaging      There is no pertinent urological imaging for my review

## 2019-03-18 ENCOUNTER — TELEPHONE (OUTPATIENT)
Dept: UROLOGY | Facility: CLINIC | Age: 44
End: 2019-03-18

## 2019-04-01 ENCOUNTER — ANESTHESIA (OUTPATIENT)
Dept: PERIOP | Facility: HOSPITAL | Age: 44
End: 2019-04-01
Payer: COMMERCIAL

## 2019-04-01 ENCOUNTER — TELEPHONE (OUTPATIENT)
Dept: UROLOGY | Facility: CLINIC | Age: 44
End: 2019-04-01

## 2019-04-01 ENCOUNTER — HOSPITAL ENCOUNTER (OUTPATIENT)
Facility: HOSPITAL | Age: 44
Setting detail: OUTPATIENT SURGERY
Discharge: HOME | End: 2019-04-01
Attending: UROLOGY | Admitting: UROLOGY
Payer: COMMERCIAL

## 2019-04-01 ENCOUNTER — ANESTHESIA EVENT (OUTPATIENT)
Dept: PERIOP | Facility: HOSPITAL | Age: 44
End: 2019-04-01
Payer: COMMERCIAL

## 2019-04-01 VITALS
OXYGEN SATURATION: 97 % | TEMPERATURE: 97.5 F | BODY MASS INDEX: 26.51 KG/M2 | SYSTOLIC BLOOD PRESSURE: 126 MMHG | WEIGHT: 179 LBS | HEART RATE: 71 BPM | RESPIRATION RATE: 16 BRPM | HEIGHT: 69 IN | DIASTOLIC BLOOD PRESSURE: 81 MMHG

## 2019-04-01 DIAGNOSIS — A63.0 GENITAL WARTS: Primary | ICD-10-CM

## 2019-04-01 PROCEDURE — 88305 TISSUE EXAM BY PATHOLOGIST: CPT | Performed by: PATHOLOGY

## 2019-04-01 PROCEDURE — ERR1 ERRONEOUS ENCOUNTER-DISREGARD: Performed by: UROLOGY

## 2019-04-01 PROCEDURE — 54065 DESTRUCTION PENIS LESION(S): CPT | Performed by: UROLOGY

## 2019-04-01 RX ORDER — ACETAMINOPHEN 325 MG/1
650 TABLET ORAL EVERY 6 HOURS PRN
Status: DISCONTINUED | OUTPATIENT
Start: 2019-04-01 | End: 2019-04-01 | Stop reason: HOSPADM

## 2019-04-01 RX ORDER — ONDANSETRON 2 MG/ML
4 INJECTION INTRAMUSCULAR; INTRAVENOUS EVERY 6 HOURS PRN
Status: DISCONTINUED | OUTPATIENT
Start: 2019-04-01 | End: 2019-04-01 | Stop reason: HOSPADM

## 2019-04-01 RX ORDER — CEFAZOLIN SODIUM 2 G/50ML
2000 SOLUTION INTRAVENOUS ONCE
Status: COMPLETED | OUTPATIENT
Start: 2019-04-01 | End: 2019-04-01

## 2019-04-01 RX ORDER — OXYCODONE HYDROCHLORIDE AND ACETAMINOPHEN 5; 325 MG/1; MG/1
1 TABLET ORAL EVERY 6 HOURS PRN
Qty: 12 TABLET | Refills: 0 | Status: SHIPPED | OUTPATIENT
Start: 2019-04-01 | End: 2019-04-06

## 2019-04-01 RX ORDER — SODIUM CHLORIDE 9 MG/ML
100 INJECTION, SOLUTION INTRAVENOUS CONTINUOUS
Status: DISCONTINUED | OUTPATIENT
Start: 2019-04-01 | End: 2019-04-01 | Stop reason: HOSPADM

## 2019-04-01 RX ORDER — PROPOFOL 10 MG/ML
INJECTION, EMULSION INTRAVENOUS AS NEEDED
Status: DISCONTINUED | OUTPATIENT
Start: 2019-04-01 | End: 2019-04-01 | Stop reason: SURG

## 2019-04-01 RX ORDER — FENTANYL CITRATE/PF 50 MCG/ML
50 SYRINGE (ML) INJECTION
Status: COMPLETED | OUTPATIENT
Start: 2019-04-01 | End: 2019-04-01

## 2019-04-01 RX ORDER — DOCUSATE SODIUM 100 MG/1
100 CAPSULE, LIQUID FILLED ORAL 3 TIMES DAILY
Qty: 42 CAPSULE | Refills: 0 | Status: SHIPPED | OUTPATIENT
Start: 2019-04-01 | End: 2019-04-29

## 2019-04-01 RX ORDER — OXYCODONE HYDROCHLORIDE AND ACETAMINOPHEN 5; 325 MG/1; MG/1
2 TABLET ORAL EVERY 6 HOURS PRN
Status: DISCONTINUED | OUTPATIENT
Start: 2019-04-01 | End: 2019-04-01 | Stop reason: HOSPADM

## 2019-04-01 RX ORDER — FENTANYL CITRATE 50 UG/ML
INJECTION, SOLUTION INTRAMUSCULAR; INTRAVENOUS AS NEEDED
Status: DISCONTINUED | OUTPATIENT
Start: 2019-04-01 | End: 2019-04-01 | Stop reason: SURG

## 2019-04-01 RX ORDER — MIDAZOLAM HYDROCHLORIDE 1 MG/ML
INJECTION INTRAMUSCULAR; INTRAVENOUS AS NEEDED
Status: DISCONTINUED | OUTPATIENT
Start: 2019-04-01 | End: 2019-04-01 | Stop reason: SURG

## 2019-04-01 RX ORDER — KETOROLAC TROMETHAMINE 30 MG/ML
15 INJECTION, SOLUTION INTRAMUSCULAR; INTRAVENOUS EVERY 6 HOURS PRN
Status: DISCONTINUED | OUTPATIENT
Start: 2019-04-01 | End: 2019-04-01 | Stop reason: HOSPADM

## 2019-04-01 RX ORDER — ONDANSETRON 2 MG/ML
4 INJECTION INTRAMUSCULAR; INTRAVENOUS ONCE AS NEEDED
Status: DISCONTINUED | OUTPATIENT
Start: 2019-04-01 | End: 2019-04-01 | Stop reason: HOSPADM

## 2019-04-01 RX ORDER — LIDOCAINE HYDROCHLORIDE 10 MG/ML
INJECTION, SOLUTION INFILTRATION; PERINEURAL AS NEEDED
Status: DISCONTINUED | OUTPATIENT
Start: 2019-04-01 | End: 2019-04-01 | Stop reason: SURG

## 2019-04-01 RX ORDER — ONDANSETRON 2 MG/ML
INJECTION INTRAMUSCULAR; INTRAVENOUS AS NEEDED
Status: DISCONTINUED | OUTPATIENT
Start: 2019-04-01 | End: 2019-04-01 | Stop reason: SURG

## 2019-04-01 RX ORDER — DIPHENHYDRAMINE HCL 25 MG
25 TABLET ORAL EVERY 6 HOURS PRN
Status: DISCONTINUED | OUTPATIENT
Start: 2019-04-01 | End: 2019-04-01 | Stop reason: HOSPADM

## 2019-04-01 RX ORDER — LIDOCAINE HYDROCHLORIDE 10 MG/ML
INJECTION, SOLUTION EPIDURAL; INFILTRATION; INTRACAUDAL; PERINEURAL AS NEEDED
Status: DISCONTINUED | OUTPATIENT
Start: 2019-04-01 | End: 2019-04-01 | Stop reason: HOSPADM

## 2019-04-01 RX ORDER — SODIUM CHLORIDE 9 MG/ML
INJECTION, SOLUTION INTRAVENOUS CONTINUOUS PRN
Status: DISCONTINUED | OUTPATIENT
Start: 2019-04-01 | End: 2019-04-01 | Stop reason: SURG

## 2019-04-01 RX ORDER — NICOTINE 21 MG/24HR
1 PATCH, TRANSDERMAL 24 HOURS TRANSDERMAL DAILY
Qty: 28 PATCH | Refills: 0 | Status: SHIPPED | OUTPATIENT
Start: 2019-04-01 | End: 2019-04-25 | Stop reason: SDUPTHER

## 2019-04-01 RX ADMIN — OXYCODONE HYDROCHLORIDE AND ACETAMINOPHEN 2 TABLET: 5; 325 TABLET ORAL at 14:32

## 2019-04-01 RX ADMIN — FENTANYL CITRATE 50 MCG: 50 INJECTION INTRAMUSCULAR; INTRAVENOUS at 13:41

## 2019-04-01 RX ADMIN — FENTANYL CITRATE 50 MCG: 50 INJECTION INTRAMUSCULAR; INTRAVENOUS at 13:35

## 2019-04-01 RX ADMIN — ONDANSETRON 4 MG: 2 INJECTION INTRAMUSCULAR; INTRAVENOUS at 12:31

## 2019-04-01 RX ADMIN — FENTANYL CITRATE 50 MCG: 50 INJECTION INTRAMUSCULAR; INTRAVENOUS at 13:46

## 2019-04-01 RX ADMIN — CEFAZOLIN SODIUM 2000 MG: 2 SOLUTION INTRAVENOUS at 12:03

## 2019-04-01 RX ADMIN — FENTANYL CITRATE 50 MCG: 50 INJECTION INTRAMUSCULAR; INTRAVENOUS at 13:02

## 2019-04-01 RX ADMIN — PROPOFOL 200 MG: 10 INJECTION, EMULSION INTRAVENOUS at 12:31

## 2019-04-01 RX ADMIN — MIDAZOLAM HYDROCHLORIDE 2 MG: 1 INJECTION, SOLUTION INTRAMUSCULAR; INTRAVENOUS at 12:25

## 2019-04-01 RX ADMIN — SODIUM CHLORIDE: 0.9 INJECTION, SOLUTION INTRAVENOUS at 12:11

## 2019-04-01 RX ADMIN — FENTANYL CITRATE 50 MCG: 50 INJECTION INTRAMUSCULAR; INTRAVENOUS at 12:29

## 2019-04-01 RX ADMIN — LIDOCAINE HYDROCHLORIDE ANHYDROUS 50 MG: 10 INJECTION, SOLUTION INFILTRATION at 12:31

## 2019-04-01 RX ADMIN — FENTANYL CITRATE 50 MCG: 50 INJECTION INTRAMUSCULAR; INTRAVENOUS at 13:55

## 2019-04-03 ENCOUNTER — TELEPHONE (OUTPATIENT)
Dept: UROLOGY | Facility: CLINIC | Age: 44
End: 2019-04-03

## 2019-04-16 ENCOUNTER — TELEPHONE (OUTPATIENT)
Dept: UROLOGY | Facility: MEDICAL CENTER | Age: 44
End: 2019-04-16

## 2019-04-17 ENCOUNTER — TELEPHONE (OUTPATIENT)
Dept: UROLOGY | Facility: CLINIC | Age: 44
End: 2019-04-17

## 2019-04-25 DIAGNOSIS — A63.0 GENITAL WARTS: ICD-10-CM

## 2019-04-29 ENCOUNTER — OFFICE VISIT (OUTPATIENT)
Dept: UROLOGY | Facility: CLINIC | Age: 44
End: 2019-04-29
Payer: COMMERCIAL

## 2019-04-29 VITALS
HEIGHT: 64 IN | HEART RATE: 72 BPM | WEIGHT: 178 LBS | SYSTOLIC BLOOD PRESSURE: 116 MMHG | BODY MASS INDEX: 30.39 KG/M2 | DIASTOLIC BLOOD PRESSURE: 84 MMHG

## 2019-04-29 DIAGNOSIS — N52.9 ERECTILE DYSFUNCTION, UNSPECIFIED ERECTILE DYSFUNCTION TYPE: ICD-10-CM

## 2019-04-29 DIAGNOSIS — A63.0 GENITAL WARTS: Primary | ICD-10-CM

## 2019-04-29 PROCEDURE — 99214 OFFICE O/P EST MOD 30 MIN: CPT | Performed by: UROLOGY

## 2019-06-14 DIAGNOSIS — A63.0 GENITAL WARTS: ICD-10-CM

## 2019-08-12 ENCOUNTER — OFFICE VISIT (OUTPATIENT)
Dept: INTERNAL MEDICINE CLINIC | Facility: CLINIC | Age: 44
End: 2019-08-12
Payer: COMMERCIAL

## 2019-08-12 VITALS
BODY MASS INDEX: 30.11 KG/M2 | HEART RATE: 84 BPM | TEMPERATURE: 98.4 F | SYSTOLIC BLOOD PRESSURE: 120 MMHG | RESPIRATION RATE: 18 BRPM | DIASTOLIC BLOOD PRESSURE: 70 MMHG | OXYGEN SATURATION: 97 % | WEIGHT: 176.4 LBS | HEIGHT: 64 IN

## 2019-08-12 DIAGNOSIS — M25.551 CHRONIC RIGHT HIP PAIN: ICD-10-CM

## 2019-08-12 DIAGNOSIS — E66.09 CLASS 1 OBESITY DUE TO EXCESS CALORIES WITHOUT SERIOUS COMORBIDITY WITH BODY MASS INDEX (BMI) OF 30.0 TO 30.9 IN ADULT: ICD-10-CM

## 2019-08-12 DIAGNOSIS — K21.9 GASTROESOPHAGEAL REFLUX DISEASE, ESOPHAGITIS PRESENCE NOT SPECIFIED: Primary | ICD-10-CM

## 2019-08-12 DIAGNOSIS — A63.0 GENITAL WARTS: ICD-10-CM

## 2019-08-12 DIAGNOSIS — G89.29 CHRONIC RIGHT HIP PAIN: ICD-10-CM

## 2019-08-12 DIAGNOSIS — R17 ELEVATED BILIRUBIN: ICD-10-CM

## 2019-08-12 PROCEDURE — 4004F PT TOBACCO SCREEN RCVD TLK: CPT | Performed by: NURSE PRACTITIONER

## 2019-08-12 PROCEDURE — 3008F BODY MASS INDEX DOCD: CPT | Performed by: NURSE PRACTITIONER

## 2019-08-12 PROCEDURE — 99214 OFFICE O/P EST MOD 30 MIN: CPT | Performed by: NURSE PRACTITIONER

## 2019-08-12 NOTE — PROGRESS NOTES
Assessment/Plan:    Acid reflux  Continue aciphex  Encouraged alcohol and smoking cessation but patient is not interested     Genital warts  S/p laser excision by urology       Chronic right hip pain  Declined imaging studies   He wants to continue chiropractor       Elevated bilirubin  Not interested in alcohol cessation  Recheck liver enzymes        Diagnoses and all orders for this visit:    Gastroesophageal reflux disease, esophagitis presence not specified  -     CBC and differential; Future    Elevated bilirubin  -     Comprehensive metabolic panel; Future    Genital warts    Class 1 obesity due to excess calories without serious comorbidity with body mass index (BMI) of 30 0 to 30 9 in adult  -     Lipid Panel with Direct LDL reflex; Future    Chronic right hip pain          Subjective:      Patient ID: Florence Wilson is a 37 y o  male  Here for follow up    On aciphex for reflux, only takes if on the weekends when he drinks alcohol  Drinks "a lot" on fridays and saturdays, cannot give an amount   Smokes 1 pack per week   Uses marijuana for chronic 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, has a medical card      Genital warts- s/p laser excision by urology   Doing much better     Elevated bili last year, has not had rechecked     Chronic right hip pain  Declines an xray   Sees a chiropractor which helps temporarily            The following portions of the patient's history were reviewed and updated as appropriate: allergies, current medications, past family history, past medical history, past social history, past surgical history and problem list     Review of Systems   Constitutional: Negative for activity change, appetite change, fatigue and unexpected weight change  Eyes: Negative for visual disturbance  Respiratory: Negative for cough, chest tightness and shortness of breath  Cardiovascular: Negative for chest pain, palpitations and leg swelling     Gastrointestinal: Negative for abdominal pain, blood in stool, constipation and diarrhea  Genitourinary: Negative for difficulty urinating  Musculoskeletal: Negative for arthralgias  Chronic right hip and right ankle pain    Skin: Negative for rash  Neurological: Negative for dizziness, weakness, light-headedness and headaches  Psychiatric/Behavioral: Negative for sleep disturbance  Objective:      /70   Pulse 84   Temp 98 4 °F (36 9 °C) (Oral)   Resp 18   Ht 5' 4" (1 626 m)   Wt 80 kg (176 lb 6 4 oz)   SpO2 97%   BMI 30 28 kg/m²          Physical Exam   Constitutional: He is oriented to person, place, and time  He appears well-developed and well-nourished  HENT:   Mouth/Throat: Oropharynx is clear and moist    Eyes: Pupils are equal, round, and reactive to light  Conjunctivae are normal    Neck: No thyromegaly present  Cardiovascular: Normal rate, regular rhythm, normal heart sounds and intact distal pulses  Pulmonary/Chest: Effort normal and breath sounds normal    Abdominal: Soft  Bowel sounds are normal    Musculoskeletal: He exhibits no edema  Right hip: He exhibits decreased strength  He exhibits normal range of motion  Lymphadenopathy:     He has no cervical adenopathy  Neurological: He is alert and oriented to person, place, and time  Skin: Skin is warm and dry  Psychiatric: He has a normal mood and affect  His behavior is normal    Vitals reviewed

## 2019-09-23 DIAGNOSIS — M25.511 ACUTE PAIN OF BOTH SHOULDERS: ICD-10-CM

## 2019-09-23 DIAGNOSIS — M25.512 ACUTE PAIN OF BOTH SHOULDERS: ICD-10-CM

## 2019-09-24 RX ORDER — IBUPROFEN 800 MG/1
800 TABLET ORAL EVERY 8 HOURS PRN
Qty: 60 TABLET | Refills: 0 | Status: SHIPPED | OUTPATIENT
Start: 2019-09-24 | End: 2020-09-09 | Stop reason: SDUPTHER

## 2019-10-03 DIAGNOSIS — K21.9 GASTROESOPHAGEAL REFLUX DISEASE, ESOPHAGITIS PRESENCE NOT SPECIFIED: ICD-10-CM

## 2019-10-03 RX ORDER — RABEPRAZOLE SODIUM 20 MG/1
20 TABLET, DELAYED RELEASE ORAL DAILY
Qty: 90 TABLET | Refills: 1 | Status: SHIPPED | OUTPATIENT
Start: 2019-10-03 | End: 2020-04-01 | Stop reason: SDUPTHER

## 2020-04-01 DIAGNOSIS — K21.9 GASTROESOPHAGEAL REFLUX DISEASE, ESOPHAGITIS PRESENCE NOT SPECIFIED: ICD-10-CM

## 2020-04-01 RX ORDER — RABEPRAZOLE SODIUM 20 MG/1
20 TABLET, DELAYED RELEASE ORAL DAILY
Qty: 90 TABLET | Refills: 1 | Status: SHIPPED | OUTPATIENT
Start: 2020-04-01 | End: 2020-09-25

## 2020-06-10 ENCOUNTER — OFFICE VISIT (OUTPATIENT)
Dept: OBGYN CLINIC | Facility: CLINIC | Age: 45
End: 2020-06-10
Payer: COMMERCIAL

## 2020-06-10 ENCOUNTER — APPOINTMENT (OUTPATIENT)
Dept: RADIOLOGY | Facility: AMBULARY SURGERY CENTER | Age: 45
End: 2020-06-10
Attending: SURGERY
Payer: COMMERCIAL

## 2020-06-10 VITALS
HEIGHT: 64 IN | BODY MASS INDEX: 30.05 KG/M2 | DIASTOLIC BLOOD PRESSURE: 84 MMHG | WEIGHT: 176 LBS | SYSTOLIC BLOOD PRESSURE: 127 MMHG | HEART RATE: 67 BPM

## 2020-06-10 DIAGNOSIS — M77.02 MEDIAL EPICONDYLITIS OF LEFT ELBOW: ICD-10-CM

## 2020-06-10 DIAGNOSIS — M25.522 PAIN IN LEFT ELBOW: ICD-10-CM

## 2020-06-10 DIAGNOSIS — R20.0 NUMBNESS AND TINGLING IN LEFT HAND: ICD-10-CM

## 2020-06-10 DIAGNOSIS — R20.2 NUMBNESS AND TINGLING IN LEFT HAND: ICD-10-CM

## 2020-06-10 DIAGNOSIS — M75.22 BICEPS TENDINITIS OF LEFT UPPER EXTREMITY: Primary | ICD-10-CM

## 2020-06-10 PROCEDURE — 99215 OFFICE O/P EST HI 40 MIN: CPT | Performed by: SURGERY

## 2020-06-10 PROCEDURE — 73080 X-RAY EXAM OF ELBOW: CPT

## 2020-06-10 PROCEDURE — 3008F BODY MASS INDEX DOCD: CPT | Performed by: SURGERY

## 2020-06-10 PROCEDURE — 4004F PT TOBACCO SCREEN RCVD TLK: CPT | Performed by: SURGERY

## 2020-06-10 RX ORDER — METHYLPREDNISOLONE 4 MG/1
TABLET ORAL
Qty: 1 EACH | Refills: 0 | Status: SHIPPED | OUTPATIENT
Start: 2020-06-10 | End: 2020-09-02 | Stop reason: ALTCHOICE

## 2020-06-10 RX ORDER — MELOXICAM 7.5 MG/1
15 TABLET ORAL DAILY
Qty: 30 TABLET | Refills: 3 | Status: SHIPPED | OUTPATIENT
Start: 2020-06-10 | End: 2020-09-02 | Stop reason: ALTCHOICE

## 2020-06-16 ENCOUNTER — EVALUATION (OUTPATIENT)
Dept: OCCUPATIONAL THERAPY | Facility: CLINIC | Age: 45
End: 2020-06-16
Payer: COMMERCIAL

## 2020-06-16 DIAGNOSIS — M77.02 MEDIAL EPICONDYLITIS OF LEFT ELBOW: ICD-10-CM

## 2020-06-16 DIAGNOSIS — M75.22 BICEPS TENDINITIS OF LEFT UPPER EXTREMITY: ICD-10-CM

## 2020-06-16 PROCEDURE — 97165 OT EVAL LOW COMPLEX 30 MIN: CPT | Performed by: OCCUPATIONAL THERAPIST

## 2020-06-18 ENCOUNTER — OFFICE VISIT (OUTPATIENT)
Dept: OCCUPATIONAL THERAPY | Facility: CLINIC | Age: 45
End: 2020-06-18
Payer: COMMERCIAL

## 2020-06-18 DIAGNOSIS — M77.02 MEDIAL EPICONDYLITIS OF LEFT ELBOW: ICD-10-CM

## 2020-06-18 DIAGNOSIS — M75.22 BICEPS TENDINITIS OF LEFT UPPER EXTREMITY: Primary | ICD-10-CM

## 2020-06-18 PROCEDURE — 97010 HOT OR COLD PACKS THERAPY: CPT | Performed by: OCCUPATIONAL THERAPIST

## 2020-06-18 PROCEDURE — 97140 MANUAL THERAPY 1/> REGIONS: CPT | Performed by: OCCUPATIONAL THERAPIST

## 2020-06-18 PROCEDURE — 97110 THERAPEUTIC EXERCISES: CPT | Performed by: OCCUPATIONAL THERAPIST

## 2020-06-24 ENCOUNTER — APPOINTMENT (OUTPATIENT)
Dept: OCCUPATIONAL THERAPY | Facility: CLINIC | Age: 45
End: 2020-06-24
Payer: COMMERCIAL

## 2020-06-24 ENCOUNTER — TELEPHONE (OUTPATIENT)
Dept: OBGYN CLINIC | Facility: CLINIC | Age: 45
End: 2020-06-24

## 2020-06-25 ENCOUNTER — OFFICE VISIT (OUTPATIENT)
Dept: OCCUPATIONAL THERAPY | Facility: CLINIC | Age: 45
End: 2020-06-25
Payer: COMMERCIAL

## 2020-06-25 DIAGNOSIS — M77.02 MEDIAL EPICONDYLITIS OF LEFT ELBOW: ICD-10-CM

## 2020-06-25 DIAGNOSIS — M75.22 BICEPS TENDINITIS OF LEFT UPPER EXTREMITY: Primary | ICD-10-CM

## 2020-06-25 PROCEDURE — 97110 THERAPEUTIC EXERCISES: CPT | Performed by: OCCUPATIONAL THERAPIST

## 2020-06-25 PROCEDURE — 97140 MANUAL THERAPY 1/> REGIONS: CPT | Performed by: OCCUPATIONAL THERAPIST

## 2020-07-07 ENCOUNTER — TELEPHONE (OUTPATIENT)
Dept: OTHER | Facility: OTHER | Age: 45
End: 2020-07-07

## 2020-07-07 ENCOUNTER — APPOINTMENT (OUTPATIENT)
Dept: OCCUPATIONAL THERAPY | Facility: CLINIC | Age: 45
End: 2020-07-07
Payer: COMMERCIAL

## 2020-07-07 NOTE — TELEPHONE ENCOUNTER
AN  [facility], [address] [facility], [address], [facility] [place], [place], [address]      Pt wanted to cancel appt   Gave him office #

## 2020-07-09 ENCOUNTER — OFFICE VISIT (OUTPATIENT)
Dept: OCCUPATIONAL THERAPY | Facility: CLINIC | Age: 45
End: 2020-07-09
Payer: COMMERCIAL

## 2020-07-09 DIAGNOSIS — M75.22 BICEPS TENDINITIS OF LEFT UPPER EXTREMITY: Primary | ICD-10-CM

## 2020-07-09 DIAGNOSIS — M77.02 MEDIAL EPICONDYLITIS OF LEFT ELBOW: ICD-10-CM

## 2020-07-09 PROCEDURE — 97140 MANUAL THERAPY 1/> REGIONS: CPT | Performed by: OCCUPATIONAL THERAPIST

## 2020-07-09 NOTE — PROGRESS NOTES
Daily Note     Today's date: 2020  Patient name: Viviane Solorio  : 1975  MRN: [id number]  Referring provider: Blue Gaines MD  Dx:   Encounter Diagnosis     ICD-10-CM    1  Biceps tendinitis of left upper extremity M75 22    2  Medial epicondylitis of left elbow M77 02                   Subjective: "I can't rest it"      Objective: See treatment diary below      Assessment: Pt returns after ~ 2 weeks absence  Reports no change, however less tender (grossly) medial epicondyle  Plan: Continue per plan of care        Precautions: Universal    HEP: Pec stretch, bicep stretch; eccentric WF, eccentric EF      Manuals           IASTM 25 15 15          TPR 5 5 5          Cupping 5 5 5          KT  5           Neuro Re-Ed             scap stabilization - FF and AB with bolster             Scap stab- alphabet w/weight in supine             Int/ext rot             SA pro                                                    Ther Ex             Eccentric grasp BPW 2x 10 BPW 3x 10           Eccentric EF 5# 2 x10 4# 3x 10 5# 3x 10          Eccentric WF  3# 3x 10           Eccentric pronation                                                                 Ther Activity                                                                              Modalities             mhp 10 10 10

## 2020-07-14 ENCOUNTER — OFFICE VISIT (OUTPATIENT)
Dept: OCCUPATIONAL THERAPY | Facility: CLINIC | Age: 45
End: 2020-07-14
Payer: COMMERCIAL

## 2020-07-14 DIAGNOSIS — M75.22 BICEPS TENDINITIS OF LEFT UPPER EXTREMITY: Primary | ICD-10-CM

## 2020-07-14 DIAGNOSIS — M77.02 MEDIAL EPICONDYLITIS OF LEFT ELBOW: ICD-10-CM

## 2020-07-14 PROCEDURE — 97140 MANUAL THERAPY 1/> REGIONS: CPT | Performed by: OCCUPATIONAL THERAPIST

## 2020-07-14 NOTE — PROGRESS NOTES
Daily Note     Today's date: 2020  Patient name: Anya Potts  : 1975  MRN: [id number]  Referring provider: Wendy Young MD  Dx:   Encounter Diagnosis     ICD-10-CM    1  Biceps tendinitis of left upper extremity M75 22    2  Medial epicondylitis of left elbow M77 02                   Subjective: "I can't ever rest it"      Objective: See treatment diary below      Assessment: Pain continues with frequent exacerbation from work  Plan: Continue per plan of care        Precautions: Universal    HEP: Pec stretch, bicep stretch; eccentric WF, eccentric EF      Manuals          IASTM 25 15 15 15         TPR 5 5 5 5         Cupping 5 5 5 5         KT  5  5         Neuro Re-Ed             scap stabilization - FF and AB with bolster             Scap stab- alphabet w/weight in supine             Int/ext rot             SA pro                                                    Ther Ex             Eccentric grasp BPW 2x 10 BPW 3x 10  Black 2x 10         Eccentric EF 5# 2 x10 4# 3x 10 5# 3x 10 6# 3x 10         Eccentric WF  3# 3x 10           Eccentric pronation                                                                 Ther Activity                                                                              Modalities             p 10 10 10

## 2020-07-16 ENCOUNTER — OFFICE VISIT (OUTPATIENT)
Dept: OCCUPATIONAL THERAPY | Facility: CLINIC | Age: 45
End: 2020-07-16
Payer: COMMERCIAL

## 2020-07-16 DIAGNOSIS — M75.22 BICEPS TENDINITIS OF LEFT UPPER EXTREMITY: Primary | ICD-10-CM

## 2020-07-16 DIAGNOSIS — M77.02 MEDIAL EPICONDYLITIS OF LEFT ELBOW: ICD-10-CM

## 2020-07-16 PROCEDURE — 97140 MANUAL THERAPY 1/> REGIONS: CPT | Performed by: OCCUPATIONAL THERAPIST

## 2020-07-16 PROCEDURE — 97010 HOT OR COLD PACKS THERAPY: CPT | Performed by: OCCUPATIONAL THERAPIST

## 2020-07-16 PROCEDURE — 97110 THERAPEUTIC EXERCISES: CPT | Performed by: OCCUPATIONAL THERAPIST

## 2020-07-16 NOTE — PROGRESS NOTES
Daily Note     Today's date: 2020  Patient name: Laura Nair  : 1975  MRN: [id number]  Referring provider: Yumiko Garcia MD  Dx:   Encounter Diagnosis     ICD-10-CM    1  Biceps tendinitis of left upper extremity M75 22    2  Medial epicondylitis of left elbow M77 02                   Subjective: "I never get to rest it"      Objective: See treatment diary below      Assessment: Fair tolerance; some C/O shooting pain, questionable ulnar nerve involvement  Plan: Progress note during next visit        Precautions: Universal    HEP: Pec stretch, bicep stretch; eccentric WF, eccentric EF      Manuals         IASTM 25 15 15 15 15        TPR 5 5 5 5 5        Cupping 5 5 5 5 5        KT  5  5         Neuro Re-Ed             scap stabilization - FF and AB with bolster             Scap stab- alphabet w/weight in supine             Int/ext rot             SA pro                                                    Ther Ex             Eccentric grasp BPW 2x 10 BPW 3x 10  Black 2x 10         Eccentric EF 5# 2 x10 4# 3x 10 5# 3x 10 6# 3x 10 8# 2x 10        Eccentric WF  3# 3x 10           Eccentric pronation             Int/ext rot     RTB 3x 10                                               Ther Activity                                                                              Modalities             mhp 10 10 10  10

## 2020-07-21 ENCOUNTER — OFFICE VISIT (OUTPATIENT)
Dept: OCCUPATIONAL THERAPY | Facility: CLINIC | Age: 45
End: 2020-07-21
Payer: COMMERCIAL

## 2020-07-21 DIAGNOSIS — M75.22 BICEPS TENDINITIS OF LEFT UPPER EXTREMITY: Primary | ICD-10-CM

## 2020-07-21 DIAGNOSIS — M77.02 MEDIAL EPICONDYLITIS OF LEFT ELBOW: ICD-10-CM

## 2020-07-21 PROCEDURE — 97110 THERAPEUTIC EXERCISES: CPT | Performed by: OCCUPATIONAL THERAPIST

## 2020-07-21 PROCEDURE — 97140 MANUAL THERAPY 1/> REGIONS: CPT | Performed by: OCCUPATIONAL THERAPIST

## 2020-07-21 NOTE — PROGRESS NOTES
Daily Note     Today's date: 2020  Patient name: Erwin Chau  : 1975  MRN: [id number]  Referring provider: Casandra Berger MD  Dx:   Encounter Diagnosis     ICD-10-CM    1  Biceps tendinitis of left upper extremity M75 22    2  Medial epicondylitis of left elbow M77 02                   Subjective: "I know I havent come enough times"      Objective: See treatment diary below      Assessment: Visit # 7, including initial evaluation  Pain: 4/10 with work/after work    Strength:  R 110 6, L 88 2 MMT: 5/ WF/WE/S/P  Reports some DOMS after int/ext rot shoulder PRE  States some improvement with pain  Plan: Continue per plan of care        Precautions: Universal    HEP: Pec stretch, bicep stretch; eccentric WF, eccentric EF      Manuals        IASTM 25 15 15 15 15 15       TPR 5 5 5 5 5        Cupping 5 5 5 5 5        KT  5  5         Neuro Re-Ed             scap stabilization - FF and AB with bolster             Scap stab- alphabet w/weight in supine             Int/ext rot             SA pro                                                    Ther Ex             Eccentric grasp BPW 2x 10 BPW 3x 10  Black 2x 10         Eccentric EF 5# 2 x10 4# 3x 10 5# 3x 10 6# 3x 10 8# 2x 10 8# 2x 19       Eccentric WF  3# 3x 10    7# 2x10       Eccentric pronation             Int/ext rot     RTB 3x 10 RTb 3x 10                                              Ther Activity                                                                              Modalities             mhp 10 10 10  10 5

## 2020-07-22 ENCOUNTER — OFFICE VISIT (OUTPATIENT)
Dept: OBGYN CLINIC | Facility: CLINIC | Age: 45
End: 2020-07-22
Payer: COMMERCIAL

## 2020-07-22 VITALS
BODY MASS INDEX: 30.05 KG/M2 | HEIGHT: 64 IN | DIASTOLIC BLOOD PRESSURE: 89 MMHG | HEART RATE: 92 BPM | SYSTOLIC BLOOD PRESSURE: 132 MMHG | WEIGHT: 176 LBS

## 2020-07-22 DIAGNOSIS — M75.22 BICEPS TENDINITIS OF LEFT UPPER EXTREMITY: Primary | ICD-10-CM

## 2020-07-22 DIAGNOSIS — M77.02 MEDIAL EPICONDYLITIS OF LEFT ELBOW: ICD-10-CM

## 2020-07-22 PROCEDURE — 99213 OFFICE O/P EST LOW 20 MIN: CPT | Performed by: SURGERY

## 2020-07-22 PROCEDURE — 3008F BODY MASS INDEX DOCD: CPT | Performed by: SURGERY

## 2020-07-22 PROCEDURE — 4004F PT TOBACCO SCREEN RCVD TLK: CPT | Performed by: SURGERY

## 2020-07-22 NOTE — PROGRESS NOTES
ASSESSMENT/PLAN:      Left medial epicondylitis , left biceps tendonitis     Did discuss rest would greatly help calm his medial tendonitis in part with physical therapy  He understands but prefers to work  Offered CSI, he will hold off  Discussed the cortisone is to calm the inflammation not cure of symptoms  He will continue with physical therapy, activity modification but this condition can still take 3-6 more months to resolve  He will think about taking some time off, note supplied in case he wants to use  No lifting greater than 10lbs with left arm for 6 weeks  See in 6-8 weeks      The patient verbalized understanding of exam findings and treatment plan  We engaged in the shared decision-making process and treatment options were discussed at length with the patient  Surgical and conservative management discussed today along with risks and benefits  To Do Next Visit:  Re-evaluation of current issue    ____________________________________________________________________________________________________________________________________________      CHIEF COMPLAINT:  Chief Complaint   Patient presents with    Left Elbow - Follow-up       SUBJECTIVE:  Krzysztof Haley is a 40y o  year old male in for f/u regarding his left medial epicondylitis, bicipital tendonitis  He has attended 7 session of OT  He is getting graston  He feels therapy helps some but as soon as active his symptoms return  He did medrol dose ritu, and stopped using mobic  He continues to note diffuse pain medial epicondyle and biceps tendon  He works for [employer] and is constantly lifting, grabbing boxes of various weights which further aggravate his symptoms  He does have cock up wrist brace for carpal tunnel symptoms but main issue is elbow currently  I have personally reviewed all the relevant PMH, PSH, SH, FH, Medications and allergies       PAST MEDICAL HISTORY:  Past Medical History:   Diagnosis Date    GERD (gastroesophageal reflux disease)     Umbilical hernia     last assessed 12/28/15       PAST SURGICAL HISTORY:  Past Surgical History:   Procedure Laterality Date    ANKLE SURGERY Right     closed treatment of trimalleolar ankle fracture, last assessed 12/28/15    HERNIA REPAIR      LASER ABLATION OF CONDYLOMAS N/A 4/1/2019    Procedure: EXCISION CONDYLOMA PERINEAL (PENILE/VAGINAL) WITH LASER CO2, excision of condylomata;   Surgeon: Jessica Null MD;  [facility];  Service: Urology    CT REPAIR UMBILICAL EOKO,5+O/Z,NXLKL N/A 2/1/2016    Procedure: UMBILICAL HERNIA REPAIR ;  repaired with mesh Surgeon: Vicki Hernandez DO;  [facility];  Service: General    WISDOM TOOTH EXTRACTION         FAMILY HISTORY:  Family History   Problem Relation Age of Onset    Alcohol abuse Neg Hx     Substance Abuse Neg Hx     Depression Neg Hx     Mental illness Neg Hx        SOCIAL HISTORY:  Social History     Tobacco Use    Smoking status: Current Every Day Smoker     Packs/day: 0 50     Types: Cigarettes    Smokeless tobacco: Never Used    Tobacco comment: current some day per [employer]   Substance Use Topics    Alcohol use: Yes     Frequency: 2-3 times a week     Drinks per session: 5 or 6     Comment: drinks of friday and saturday several drinks each night    Drug use: Yes     Types: Marijuana     Comment: marijuana use daily       MEDICATIONS:    Current Outpatient Medications:     ibuprofen (MOTRIN) 800 mg tablet, TAKE 1 TABLET (800 MG TOTAL) BY MOUTH EVERY 8 (EIGHT) HOURS AS NEEDED FOR MILD PAIN, Disp: 60 tablet, Rfl: 0    meloxicam (MOBIC) 7 5 mg tablet, Take 2 tablets (15 mg total) by mouth daily, Disp: 30 tablet, Rfl: 3    methylPREDNISolone 4 MG tablet therapy pack, Use as directed on package, Disp: 1 each, Rfl: 0    NICOTINE STEP 2 14 MG/24HR TD 24 hr patch, PLACE 1 PATCH ON THE SKIN DAILY EVERY 24 HOURS TO A CLEAN,DRY,HAIRLESS SITE ON THE UPPER ARM OR HIP , Disp: 28 patch, Rfl: 0   RABEprazole (ACIPHEX) 20 MG tablet, Take 1 tablet (20 mg total) by mouth daily, Disp: 90 tablet, Rfl: 1    ALLERGIES:  No Known Allergies    REVIEW OF SYSTEMS:  Review of Systems   Constitutional: Negative for chills and fever  HENT: Negative for drooling and sneezing  Eyes: Negative for redness  Respiratory: Negative for cough, shortness of breath and wheezing  Cardiovascular: Negative for chest pain  Skin: Negative for color change  Psychiatric/Behavioral: The patient is not nervous/anxious  VITALS:  Vitals:    07/22/20 1630   BP: 132/89   Pulse: 92       LABS:  HgA1c: No results found for: HGBA1C  BMP:   Lab Results   Component Value Date    GLUCOSE 105 02/17/2017    CALCIUM 9 6 06/18/2018    K 4 1 06/18/2018    CO2 24 06/18/2018    CL 99 (L) 06/18/2018    BUN 19 06/18/2018    CREATININE 1 06 06/18/2018       _____________________________________________________  PHYSICAL EXAMINATION:  General: well developed and well nourished, alert, oriented times 3 and appears comfortable  Psychiatric: Normal  HEENT: Normocephalic, Atraumatic Trachea Midline, No torticollis  Pulmonary: No audible wheezing or respiratory distress   Cardiovascular: No pitting edema, 2+ radial pulse   Skin: No masses, erythema, lacerations, fluctation, ulcerations  Neurovascular: Sensation Intact to the Median, Ulnar, Radial Nerve, Motor Intact to the Median, Ulnar, Radial Nerve and Pulses Intact  Musculoskeletal: Normal, except as noted in detailed exam and in HPI  MUSCULOSKELETAL EXAMINATION:    Left Elbow:  No swelling or deformity  Full range of motion with flexion, extension, supination and pronation  Positive tenderness to palpation over the Medical Epicondyles and bicep's tendon  Pain with passive extension of the wrist with elbow fully extended  Pain with resisted wrist extension with elbow fully extended    Negative tinels over the ulnar nerve at the elbow   ___________________________________________________  STUDIES REVIEWED:  No new images to review           PROCEDURES PERFORMED:  Procedures      _____________________________________________________      Dima Cure    I,:   Lizy Camilo am acting as a scribe while in the presence of the attending physician :        I,:   Karen Barragan MD personally performed the services described in this documentation    as scribed in my presence :

## 2020-07-22 NOTE — LETTER
July 22, 2020     Patient: Tiana Burkett   YOB: 1975   Date of Visit: 7/22/2020       To Whom it May Concern:    Tiana Burkett is under my professional care  He was seen in my office on 7/22/2020  He has following restriction max lifting 10 lbs with left arm until next evaluation  If you have any questions or concerns, please don't hesitate to call           Sincerely,          Josse Aguila MD        CC: No Recipients

## 2020-07-23 ENCOUNTER — OFFICE VISIT (OUTPATIENT)
Dept: OCCUPATIONAL THERAPY | Facility: CLINIC | Age: 45
End: 2020-07-23
Payer: COMMERCIAL

## 2020-07-23 DIAGNOSIS — M77.02 MEDIAL EPICONDYLITIS OF LEFT ELBOW: ICD-10-CM

## 2020-07-23 DIAGNOSIS — M75.22 BICEPS TENDINITIS OF LEFT UPPER EXTREMITY: Primary | ICD-10-CM

## 2020-07-23 PROCEDURE — 97140 MANUAL THERAPY 1/> REGIONS: CPT | Performed by: OCCUPATIONAL THERAPIST

## 2020-07-23 PROCEDURE — 97110 THERAPEUTIC EXERCISES: CPT | Performed by: OCCUPATIONAL THERAPIST

## 2020-07-23 NOTE — PROGRESS NOTES
Daily Note     Today's date: 2020  Patient name: Bahman Holt  : 1975  MRN: [id number]  Referring provider: Katharina Pearl MD  Dx:   Encounter Diagnosis     ICD-10-CM    1  Biceps tendinitis of left upper extremity M75 22    2  Medial epicondylitis of left elbow M77 02                   Subjective: "I don't know if I stop working if it will even get better"      Objective: See treatment diary below  Assessment: Increased medial epicondyle pain today to palpation  Discomfort with eccentrics, but able to tolerate and complete PREs  Plan: Continue per plan of care        Precautions: Universal    HEP: Pec stretch, bicep stretch; eccentric WF, eccentric EF      Manuals       IASTM 25 15 15 15 15 15 15'      TPR 5 5 5 5 5  5'      Cupping 5 5 5 5 5  5'      KT  5  5         Neuro Re-Ed             scap stabilization - FF and AB with bolster             Scap stab- alphabet w/weight in supine             Int/ext rot             SA pro                                                    Ther Ex             Eccentric grasp BPW 2x 10 BPW 3x 10  Black 2x 10         Eccentric EF 5# 2 x10 4# 3x 10 5# 3x 10 6# 3x 10 8# 2x 10 8# 2x 19 8# 2x10       Eccentric WF  3# 3x 10    7# 2x10 7# 2x10       Eccentric pronation             Int/ext rot     RTB 3x 10 RTb 3x 10 RTB 3x 10                                             Ther Activity                                                                              Modalities             mhp 10 10 10  10 5 5'

## 2020-07-28 ENCOUNTER — OFFICE VISIT (OUTPATIENT)
Dept: OCCUPATIONAL THERAPY | Facility: CLINIC | Age: 45
End: 2020-07-28
Payer: COMMERCIAL

## 2020-07-28 DIAGNOSIS — M75.22 BICEPS TENDINITIS OF LEFT UPPER EXTREMITY: Primary | ICD-10-CM

## 2020-07-28 DIAGNOSIS — M77.02 MEDIAL EPICONDYLITIS OF LEFT ELBOW: ICD-10-CM

## 2020-07-28 PROCEDURE — 97140 MANUAL THERAPY 1/> REGIONS: CPT | Performed by: OCCUPATIONAL THERAPIST

## 2020-07-28 NOTE — PROGRESS NOTES
Daily Note     Today's date: 2020  Patient name: Jono Brock  : 1975  MRN: [id number]  Referring provider: Franny Carranza MD  Dx:   Encounter Diagnosis     ICD-10-CM    1  Biceps tendinitis of left upper extremity M75 22    2  Medial epicondylitis of left elbow M77 02                   Subjective: "I can't rest it"      Objective: See treatment diary below      Assessment: See RE      Plan: Continue per plan of care        Precautions: Universal    HEP: Pec stretch, bicep stretch; eccentric WF, eccentric EF      Manuals      IASTM 25 15 15 15 15 15 15' 15     TPR 5 5 5 5 5  5'      Cupping 5 5 5 5 5  5'      KT  5  5    5     Neuro Re-Ed             scap stabilization - FF and AB with bolster             Scap stab- alphabet w/weight in supine             Int/ext rot             SA pro                                                    Ther Ex             Eccentric grasp BPW 2x 10 BPW 3x 10  Black 2x 10         Eccentric EF 5# 2 x10 4# 3x 10 5# 3x 10 6# 3x 10 8# 2x 10 8# 2x 19 8# 2x10       Eccentric WF  3# 3x 10    7# 2x10 7# 2x10       Eccentric pronation             Int/ext rot     RTB 3x 10 RTb 3x 10 RTB 3x 10                                             Ther Activity                                                                              Modalities             mhp 10 10 10  10 5 5' 5

## 2020-07-28 NOTE — PROGRESS NOTES
OT Re-Evaluation     Today's date: 2020  Patient name: Paul Noriega  : 1975  MRN: [id number]  Referring provider: Anjana Diggs MD  Dx:   Encounter Diagnosis     ICD-10-CM    1  Biceps tendinitis of left upper extremity M75 22    2  Medial epicondylitis of left elbow M77 02                   Assessment  Assessment details: Alfonso Mendoza presents with pain for several months in his distal bicep, medial epicondyle pain and median nerve irritation/compression  He also demonstrates proximal weakness and decreased AROM  He works at a [email or web address] and frequently has to lift moderate to heavy objects to chest an overhead levels  Recommend tx 2x/week for 4-6 weeks to reach goals    : Alfonso Mendoza still presents with pain, somewhat worse weakness and somewhat worse motion  He does have point tenderness at his mediall epicondyle, but worse pain at his cubital tunnel and symptomatic neural tension across his ulnar nerve  He also has recently been complaining of worse pain at night  Goals  STG) Motion increased by 25% in 4-6 weeks NOT MET  STG) Strength/Motor skills improved by 25% in 4-6 weeks NOT MET  STG) Pain decreased by 25% in 4-6 weeks PARTIALLY MET      STG) Decreased neural tension in 4 weeks  STG) Improved AROM and strength in 4 weeks  STG) Decreased pain 2-3 points in 4 weeks    LTG) ADL and IADL skills improved   LTG) Work skills improved  LTG) Leisure skills improved    Patient Goals: Pt wants less pain with work activities      Goals, plan of care and treatment condition discussed with patient  Patient expresses their understanding and questions regarding these isues were addressed        Plan  Planned modality interventions: thermotherapy: hydrocollator packs  Planned therapy interventions: Chen taping, manual therapy, joint mobilization, neuromuscular re-education, orthotic fitting/training, strengthening, therapeutic activities and therapeutic exercise  Frequency: 2x week  Duration in visits: 10        Subjective Evaluation    History of Present Illness  Mechanism of injury: Camilla Callaway has had L bicep distal tenderness for ~ 1 year  He also reports occasional N/T in his thumb and 1st 3 digits  He also has a dx of medial epicondolitis     Pain  Current pain ratin  At worst pain ratin    Hand dominance: right          Objective     Active Range of Motion   Left Shoulder   Flexion: 160 degrees   Extension: 60 degrees   Abduction: 145 degrees with pain  External rotation 45°: 55 degrees     Left Elbow   Flexion: WFL  Extension: WFL  Forearm supination: 85 degrees   Forearm pronation: 80 degrees     Left Wrist   Wrist flexion: 45 degrees   Wrist extension: 44 degrees     Additional Active Range of Motion Details  Pain w/ pronation    Strength/Myotome Testing     Left Shoulder     Planes of Motion   Flexion: 4   Abduction: 4     Left Elbow   Flexion: 4+  Extension: 4+  Forearm supination: 5  Forearm pronation: 4+    Left Wrist/Hand   Wrist extension: 5  Wrist flexion: 4+     (2nd hand position)     Trial 1: 83 1    Right Wrist/Hand      (2nd hand position)     Trial 1: 116 1    Additional Strength Details  Appears pain limited    Tests     Additional Tests Details  Tender CT    + ulnar  Nerve tension

## 2020-07-30 ENCOUNTER — APPOINTMENT (OUTPATIENT)
Dept: OCCUPATIONAL THERAPY | Facility: CLINIC | Age: 45
End: 2020-07-30
Payer: COMMERCIAL

## 2020-08-04 ENCOUNTER — OFFICE VISIT (OUTPATIENT)
Dept: OCCUPATIONAL THERAPY | Facility: CLINIC | Age: 45
End: 2020-08-04
Payer: COMMERCIAL

## 2020-08-04 DIAGNOSIS — M75.22 BICEPS TENDINITIS OF LEFT UPPER EXTREMITY: Primary | ICD-10-CM

## 2020-08-04 DIAGNOSIS — M77.02 MEDIAL EPICONDYLITIS OF LEFT ELBOW: ICD-10-CM

## 2020-08-04 PROCEDURE — 97140 MANUAL THERAPY 1/> REGIONS: CPT | Performed by: OCCUPATIONAL THERAPIST

## 2020-08-04 NOTE — PROGRESS NOTES
Daily Note     Today's date: 2020  Patient name: Ellyn Aschoff  : 1975  MRN: [id number]  Referring provider: Oscar Rose MD  Dx:   Encounter Diagnosis     ICD-10-CM    1  Biceps tendinitis of left upper extremity  M75 22    2  Medial epicondylitis of left elbow  M77 02                   Subjective: "I think if I feel the pain it is helping it"      Objective: See treatment diary below      Assessment: Cubital tunnel discomfort with elbow flexion greater than 90 degrees  No radiating symptoms or complaints of numbness or tingling  Held other PREs due to pain  Plan: Continue per plan of care        Precautions: Universal    HEP: Pec stretch, bicep stretch; eccentric WF, eccentric EF      Manuals     IASTM 25 15 15 15 15 15 15' 15 20'    TPR 5 5 5 5 5  5'      Cupping 5 5 5 5 5  5'  5'    JADE         10'    KT  5  5    5     Neuro Re-Ed             scap stabilization - FF and AB with bolster             Scap stab- alphabet w/weight in supine         2#,1x    Int/ext rot             SA pro                                                    Ther Ex             Eccentric grasp BPW 2x 10 BPW 3x 10  Black 2x 10         Eccentric EF 5# 2 x10 4# 3x 10 5# 3x 10 6# 3x 10 8# 2x 10 8# 2x 19 8# 2x10       Eccentric WF  3# 3x 10    7# 2x10 7# 2x10       Eccentric pronation             Int/ext rot     RTB 3x 10 RTb 3x 10 RTB 3x 10  RTB 3x10                                           Ther Activity                                                                              Modalities             mhp 10 10 10  10 5 5' 5 5'

## 2020-08-06 ENCOUNTER — OFFICE VISIT (OUTPATIENT)
Dept: OCCUPATIONAL THERAPY | Facility: CLINIC | Age: 45
End: 2020-08-06
Payer: COMMERCIAL

## 2020-08-06 DIAGNOSIS — M77.02 MEDIAL EPICONDYLITIS OF LEFT ELBOW: ICD-10-CM

## 2020-08-06 DIAGNOSIS — M75.22 BICEPS TENDINITIS OF LEFT UPPER EXTREMITY: Primary | ICD-10-CM

## 2020-08-06 PROCEDURE — 97140 MANUAL THERAPY 1/> REGIONS: CPT | Performed by: OCCUPATIONAL THERAPIST

## 2020-08-06 NOTE — PROGRESS NOTES
Daily Note     Today's date: 2020  Patient name: Georgina Saldivar  : 1975  MRN: [id number]  Referring provider: Clari Keys MD  Dx:   Encounter Diagnosis     ICD-10-CM    1  Biceps tendinitis of left upper extremity  M75 22    2  Medial epicondylitis of left elbow  M77 02                   Subjective: "it is right here" referring to the cubital tunnel for area of discomfort      Objective: See treatment diary below      Assessment: Cubital tunnel pain with elbow flexion greater than 90 degrees  No radiating symptoms or complaints of numbness or tingling  Held other PREs due to pain which is worse after multiple days of work  Plan: Continue per plan of care        Precautions: Universal    HEP: Pec stretch, bicep stretch; eccentric WF, eccentric EF      Manuals    IASTM 25 15 15 15 15 15 15' 15 20' 20'   TPR 5 5 5 5 5  5'      Cupping 5 5 5 5 5  5'  5' 5'   JADE         10' 10'   KT  5  5    5     Neuro Re-Ed             scap stabilization - FF and AB with bolster             Scap stab- alphabet w/weight in supine         2#,1x 2#,1x   Int/ext rot             SA pro                                                    Ther Ex             Eccentric grasp BPW 2x 10 BPW 3x 10  Black 2x 10         Eccentric EF 5# 2 x10 4# 3x 10 5# 3x 10 6# 3x 10 8# 2x 10 8# 2x 19 8# 2x10       Eccentric WF  3# 3x 10    7# 2x10 7# 2x10       Eccentric pronation             Int/ext rot     RTB 3x 10 RTb 3x 10 RTB 3x 10  RTB 3x10 RTB 3x10                                          Ther Activity                                                                              Modalities             mhp 10 10 10  10 5 5' 5 5' 5'

## 2020-08-11 ENCOUNTER — OFFICE VISIT (OUTPATIENT)
Dept: OCCUPATIONAL THERAPY | Facility: CLINIC | Age: 45
End: 2020-08-11
Payer: COMMERCIAL

## 2020-08-11 DIAGNOSIS — M77.02 MEDIAL EPICONDYLITIS OF LEFT ELBOW: ICD-10-CM

## 2020-08-11 DIAGNOSIS — M75.22 BICEPS TENDINITIS OF LEFT UPPER EXTREMITY: Primary | ICD-10-CM

## 2020-08-11 PROCEDURE — 97140 MANUAL THERAPY 1/> REGIONS: CPT | Performed by: OCCUPATIONAL THERAPIST

## 2020-08-11 NOTE — PROGRESS NOTES
Daily Note     Today's date: 2020  Patient name: Bahman Holt  : 1975  MRN: [id number]  Referring provider: Katharina Pearl MD  Dx:   Encounter Diagnosis     ICD-10-CM    1  Biceps tendinitis of left upper extremity  M75 22    2  Medial epicondylitis of left elbow  M77 02                   Subjective: "It's Tuesday, so the pain isn't so bad"      Objective: See treatment diary below      Assessment: Pt admits to not trying elbow block/towel; urged to try in order to determine ulnar nerve contribution to pain  Continued neural tension across wrist and elbow  Plan: Continue per plan of care        Precautions: Universal    HEP: Pec stretch, bicep stretch; eccentric WF, eccentric EF, doorway pec      Manuals    IASTM 10 15 15 15 15 15 15' 15 20' 20'   TPR  5 5 5 5  5'      Cupping 5 moving, distal to CT 5 5 5 5  5'  5' 5'   JADE 10        10' 10'   KT 5 5  5    5     Neuro Re-Ed             scap stabilization - FF and AB with bolster             Scap stab- alphabet w/weight in supine         2#,1x 2#,1x   Int/ext rot             SA pro                                                    Ther Ex             Eccentric grasp  BPW 3x 10  Black 2x 10         Eccentric EF  4# 3x 10 5# 3x 10 6# 3x 10 8# 2x 10 8# 2x 19 8# 2x10       Eccentric WF  3# 3x 10    7# 2x10 7# 2x10       Eccentric pronation             Int/ext rot     RTB 3x 10 RTb 3x 10 RTB 3x 10  RTB 3x10 RTB 3x10                                          Ther Activity                                       EDU Elbow block for night wear                                      Modalities             mhp 10 10 10  10 5 5' 5 5' 5'

## 2020-08-13 ENCOUNTER — OFFICE VISIT (OUTPATIENT)
Dept: OCCUPATIONAL THERAPY | Facility: CLINIC | Age: 45
End: 2020-08-13
Payer: COMMERCIAL

## 2020-08-13 DIAGNOSIS — M77.02 MEDIAL EPICONDYLITIS OF LEFT ELBOW: ICD-10-CM

## 2020-08-13 DIAGNOSIS — M75.22 BICEPS TENDINITIS OF LEFT UPPER EXTREMITY: Primary | ICD-10-CM

## 2020-08-13 PROCEDURE — 97110 THERAPEUTIC EXERCISES: CPT | Performed by: OCCUPATIONAL THERAPIST

## 2020-08-13 PROCEDURE — 97140 MANUAL THERAPY 1/> REGIONS: CPT | Performed by: OCCUPATIONAL THERAPIST

## 2020-08-13 NOTE — PROGRESS NOTES
Daily Note     Today's date: 2020  Patient name: Jono Brock  : 1975  MRN: [id number]  Referring provider: Franny Carranza MD  Dx:   Encounter Diagnosis     ICD-10-CM    1  Biceps tendinitis of left upper extremity  M75 22    2  Medial epicondylitis of left elbow  M77 02                   Subjective: "Yeah I started the towel thing"      Objective: See treatment diary below      Assessment: Pt can localize pain to cubital tunnel and reports painful symptoms with JADE, but no N/T  Grossly less today for neural tension, unsure of contribution from towel block  Plan: Continue per plan of care        Precautions: Universal    HEP: Pec stretch, bicep stretch; eccentric WF, eccentric EF, doorway pec      Manuals    IASTM 10 10 15 15 15 15 15' 15 20' 20'   TPR   5 5 5  5'      Cupping 5 moving, distal to CT 5 moving to ct 5 5 5  5'  5' 5'   JADE 10 10       10' 10'   KT 5 5  5    5     Neuro Re-Ed             scap stabilization - FF and AB with bolster             Scap stab- alphabet w/weight in supine         2#,1x 2#,1x   Int/ext rot             SA pro                                                    Ther Ex             Eccentric grasp  BPW 3x 10  Black 2x 10         Eccentric EF  4# 3x 10 5# 3x 10 6# 3x 10 8# 2x 10 8# 2x 19 8# 2x10       Eccentric WF  3# 3x 10    7# 2x10 7# 2x10       Eccentric pronation             Int/ext rot  rtb 3x 10   RTB 3x 10 RTb 3x 10 RTB 3x 10  RTB 3x10 RTB 3x10   scap add  M, L 3x 10 B                                     Ther Activity                                       EDU Elbow block for night wear                                      Modalities             mhp 10 5 10  10 5 5' 5 5' 5'

## 2020-08-18 ENCOUNTER — OFFICE VISIT (OUTPATIENT)
Dept: OCCUPATIONAL THERAPY | Facility: CLINIC | Age: 45
End: 2020-08-18
Payer: COMMERCIAL

## 2020-08-18 DIAGNOSIS — M77.02 MEDIAL EPICONDYLITIS OF LEFT ELBOW: ICD-10-CM

## 2020-08-18 DIAGNOSIS — M75.22 BICEPS TENDINITIS OF LEFT UPPER EXTREMITY: Primary | ICD-10-CM

## 2020-08-18 PROCEDURE — 97140 MANUAL THERAPY 1/> REGIONS: CPT | Performed by: OCCUPATIONAL THERAPIST

## 2020-08-18 PROCEDURE — 97110 THERAPEUTIC EXERCISES: CPT | Performed by: OCCUPATIONAL THERAPIST

## 2020-08-18 NOTE — PROGRESS NOTES
Daily Note     Today's date: 2020  Patient name: Sherolyn Duverney  : 1975  MRN: [id number]  Referring provider: Karen Mcdonough MD  Dx:   Encounter Diagnosis     ICD-10-CM    1  Biceps tendinitis of left upper extremity  M75 22    2  Medial epicondylitis of left elbow  M77 02                   Subjective: "my shoulder right here is killing me today" referring to distal deltoid      Objective: See treatment diary below      Assessment: Area of most ulnar nerve referred paresthesias is 1-2 cm proximal to the cubital tunnel  Component of shoulder impingement persists  Plan: Continue per plan of care        Precautions: Universal    HEP: Pec stretch, bicep stretch; eccentric WF, eccentric EF, doorway pec      Manuals  8   IASTM 10 10 10' 15 15 15 15' 13 20' 20'   TPR    5 5  5'      Cupping 5 moving, distal to CT 5 moving to ct  5 5  5'  5' 5'   JADE 10 10 10'      10' 10'   KT 5 5  5    5     Neuro Re-Ed             scap stabilization - FF and AB with bolster             Scap stab- alphabet w/weight in supine         2#,1x 2#,1x   Int/ext rot             SA pro                                                    Ther Ex             Eccentric grasp  BPW 3x 10  Black 2x 10         Eccentric EF  4# 3x 10 4# 3x 10 6# 3x 10 8# 2x 10 8# 2x 19 8# 2x10       Eccentric WF  3# 3x 10 3# 3x 10   7# 2x10 7# 2x10       Eccentric pronation             Int/ext rot  rtb 3x 10 rtb 3x 10  RTB 3x 10 RTb 3x 10 RTB 3x 10  RTB 3x10 RTB 3x10   scap add  M, L 3x 10 B M, L 3x 10 B          Prone scapular Y's   3x10                       Ther Activity                                       EDU Elbow block for night wear                                      Modalities             mhp 10 5 10'  10 5 5' 5 5' 5'

## 2020-08-20 ENCOUNTER — OFFICE VISIT (OUTPATIENT)
Dept: OCCUPATIONAL THERAPY | Facility: CLINIC | Age: 45
End: 2020-08-20
Payer: COMMERCIAL

## 2020-08-20 DIAGNOSIS — M75.22 BICEPS TENDINITIS OF LEFT UPPER EXTREMITY: Primary | ICD-10-CM

## 2020-08-20 DIAGNOSIS — M77.02 MEDIAL EPICONDYLITIS OF LEFT ELBOW: ICD-10-CM

## 2020-08-20 PROCEDURE — 97140 MANUAL THERAPY 1/> REGIONS: CPT | Performed by: OCCUPATIONAL THERAPIST

## 2020-08-20 PROCEDURE — 97110 THERAPEUTIC EXERCISES: CPT | Performed by: OCCUPATIONAL THERAPIST

## 2020-08-20 NOTE — PROGRESS NOTES
Daily Note     Today's date: 2020  Patient name: mIelda Langston  : 1975  MRN: [id number]  Referring provider: Dennis Patton MD  Dx:   Encounter Diagnosis     ICD-10-CM    1  Biceps tendinitis of left upper extremity  M75 22    2  Medial epicondylitis of left elbow  M77 02                   Subjective: "I cant lift for very long"      Objective: See treatment diary below      Assessment: Pulling sensation but still relatively asymptomatic cubital tunnel/ulnar neural tension  More symptomatic pronator pain today  Plan: Continue per plan of care        Precautions: Universal    HEP: Pec stretch, bicep stretch; eccentric WF, eccentric EF, doorway pec      Manuals    IASTM 10 10 10' 10 15 15 15' 13 20' 20'   TPR    5 5  5'      Cupping 5 moving, distal to CT 5 moving to ct   5  5'  5' 5'   JADE 10 10 10' 10     10' 10'   KT 5 5      5     Neuro Re-Ed             scap stabilization - FF and AB with bolster             Scap stab- alphabet w/weight in supine         2#,1x 2#,1x   Int/ext rot             SA pro                                                    Ther Ex             Eccentric grasp  BPW 3x 10           Eccentric EF  4# 3x 10 4# 3x 10  8# 2x 10 8# 2x 19 8# 2x10       Eccentric WF  3# 3x 10 3# 3x 10   7# 2x10 7# 2x10       Eccentric pronation    2# 3x 10         Int/ext rot  rtb 3x 10 rtb 3x 10 rtb 3x 10 RTB 3x 10 RTb 3x 10 RTB 3x 10  RTB 3x10 RTB 3x10   scap add  M, L 3x 10 B M, L 3x 10 B M, L 3x 10 B         Prone scapular Y's   3x10 B2x 10                        Ther Activity                                       EDU Elbow block for night wear                                      Modalities             mhp 10 5 10' 5 10 5 5' 5 5' 5'

## 2020-08-25 ENCOUNTER — APPOINTMENT (OUTPATIENT)
Dept: OCCUPATIONAL THERAPY | Facility: CLINIC | Age: 45
End: 2020-08-25
Payer: COMMERCIAL

## 2020-08-27 ENCOUNTER — OFFICE VISIT (OUTPATIENT)
Dept: OCCUPATIONAL THERAPY | Facility: CLINIC | Age: 45
End: 2020-08-27
Payer: COMMERCIAL

## 2020-08-27 DIAGNOSIS — M75.22 BICEPS TENDINITIS OF LEFT UPPER EXTREMITY: Primary | ICD-10-CM

## 2020-08-27 DIAGNOSIS — M77.02 MEDIAL EPICONDYLITIS OF LEFT ELBOW: ICD-10-CM

## 2020-08-27 PROCEDURE — 97110 THERAPEUTIC EXERCISES: CPT | Performed by: OCCUPATIONAL THERAPIST

## 2020-08-27 PROCEDURE — 97140 MANUAL THERAPY 1/> REGIONS: CPT | Performed by: OCCUPATIONAL THERAPIST

## 2020-08-27 NOTE — PROGRESS NOTES
Daily Note     Today's date: 2020  Patient name: Caty Cates  : 1975  MRN: [id number]  Referring provider: Mateo Patel MD  Dx:   Encounter Diagnosis     ICD-10-CM    1  Biceps tendinitis of left upper extremity  M75 22    2  Medial epicondylitis of left elbow  M77 02                   Subjective: "I can't take work off"      Objective: See treatment diary below      Assessment: Continued pain and irritation, pt reports some relief with tx but back to same amount when he returns to work    Plan: Progress note during next visit        Precautions: Universal    HEP: Pec stretch, bicep stretch; eccentric WF, eccentric EF, doorway pec      Manuals    IASTM 10 10 10' 10 20 15 15' 13 20' 20'   TPR    5 5  5'      Cupping 5 moving, distal to CT 5 moving to ct     5'  5' 5'   JADE 10 10 10' 10     10' 10'   KT 5 5      5     Neuro Re-Ed             scap stabilization - FF and AB with bolster             Scap stab- alphabet w/weight in supine         2#,1x 2#,1x   Int/ext rot             SA pro                                                    Ther Ex             Eccentric grasp  BPW 3x 10           Eccentric EF  4# 3x 10 4# 3x 10  5# 2x 10 8# 2x 19 8# 2x10       Eccentric WF  3# 3x 10 3# 3x 10  5# 2x10 7# 2x10 7# 2x10       Eccentric pronation    2# 3x 10         Int/ext rot  rtb 3x 10 rtb 3x 10 rtb 3x 10  RTb 3x 10 RTB 3x 10  RTB 3x10 RTB 3x10   scap add  M, L 3x 10 B M, L 3x 10 B M, L 3x 10 B         Prone scapular Y's   3x10 B2x 10                        Ther Activity                                       EDU Elbow block for night wear                                      Modalities             mhp 10 5 10' 5 5 5 5' 5 5' 5'

## 2020-09-01 ENCOUNTER — EVALUATION (OUTPATIENT)
Dept: OCCUPATIONAL THERAPY | Facility: CLINIC | Age: 45
End: 2020-09-01
Payer: COMMERCIAL

## 2020-09-01 DIAGNOSIS — M75.22 BICEPS TENDINITIS OF LEFT UPPER EXTREMITY: Primary | ICD-10-CM

## 2020-09-01 DIAGNOSIS — M77.02 MEDIAL EPICONDYLITIS OF LEFT ELBOW: ICD-10-CM

## 2020-09-01 PROCEDURE — 97140 MANUAL THERAPY 1/> REGIONS: CPT | Performed by: OCCUPATIONAL THERAPIST

## 2020-09-01 PROCEDURE — 97110 THERAPEUTIC EXERCISES: CPT | Performed by: OCCUPATIONAL THERAPIST

## 2020-09-01 NOTE — PROGRESS NOTES
OT Re-Evaluation     Today's date: 2020  Patient name: Jerry Del Rosario  : 1975  MRN: [id number]  Referring provider: Juan Herrera MD  Dx:   No diagnosis found  Assessment  Assessment details: Dre Perez presents with pain for several months in his distal bicep, medial epicondyle pain and median nerve irritation/compression  He also demonstrates proximal weakness and decreased AROM  He works at a [address] and frequently has to lift moderate to heavy objects to chest an overhead levels  Recommend tx 2x/week for 4-6 weeks to reach goals    : Dre Perez still presents with pain, somewhat worse weakness and somewhat worse motion  He does have point tenderness at his mediall epicondyle, but worse pain at his cubital tunnel and symptomatic neural tension across his ulnar nerve  He also has recently been complaining of worse pain at night  : Dre Perez continues to show weakness and pain at his medial epicondyle and distal bicep, and pain with resisted elbow and wrist motion, particularly with elbow flexion and pronation and grasping  He does show good strength gains as well as AROM gains  He may benefiit from continued tx  Goals  STG) Motion increased by 25% in 4-6 weeks NOT MET  STG) Strength/Motor skills improved by 25% in 4-6 weeks NOT MET  STG) Pain decreased by 25% in 4-6 weeks PARTIALLY MET      STG) Decreased neural tension in 4 weeks Partially met  STG) Improved AROM and strength in 4 weeks MET  STG) Decreased pain 2-3 points in 4 weeks NOT MET    New STG    STG) Improved Strength in 4 weeks by 10%  STG) Decreased pain 2-3 points in 4 weeks    LTG) ADL and IADL skills improved   LTG) Work skills improved  LTG) Leisure skills improved    Patient Goals: Pt wants less pain with work activities      Goals, plan of care and treatment condition discussed with patient   Patient expresses their understanding and questions regarding these isues were addressed  Plan  Planned modality interventions: thermotherapy: hydrocollator packs  Planned therapy interventions: Chen taping, manual therapy, joint mobilization, neuromuscular re-education, orthotic fitting/training, strengthening, therapeutic activities and therapeutic exercise  Frequency: 2x week  Duration in visits: 10        Subjective Evaluation    History of Present Illness  Mechanism of injury: Farzad King has had L bicep distal tenderness for ~ 1 year  He also reports occasional N/T in his thumb and 1st 3 digits  He also has a dx of medial epicondolitis     Pain  Current pain ratin  At worst pain ratin  Quality: sharp    Hand dominance: right          Objective     Active Range of Motion   Left Shoulder   Flexion: 160 degrees   Extension: 60 degrees   Abduction: 145 degrees with pain  External rotation 45°: 55 degrees     Left Elbow   Flexion: WFL  Extension: WFL  Forearm supination: 85 degrees   Forearm pronation: 75 degrees     Left Wrist   Wrist flexion: 70 degrees   Wrist extension: 70 degrees     Additional Active Range of Motion Details  Pain w/ pronation    Strength/Myotome Testing     Left Shoulder     Planes of Motion   Flexion: 4+   Abduction: 4+     Left Elbow   Flexion: 4+  Extension: 5  Forearm supination: 5  Forearm pronation: 4+    Left Wrist/Hand   Wrist extension: 5  Wrist flexion: 5     (2nd hand position)     Trial 1: 77 2    Trial 2: 90 6    Trial 3: 93 1    Right Wrist/Hand      (2nd hand position)     Trial 1: 115 7    Trial 2: 108 5    Trial 3: 112 5    Additional Strength Details  Appears pain limited    Tests     Additional Tests Details  Tender CT    + ulnar  Nerve tension

## 2020-09-01 NOTE — PROGRESS NOTES
Daily Note     Today's date: 2020  Patient name: Jj Cano  : 1975  MRN: [id number]  Referring provider: Salas Ron MD  Dx:   Encounter Diagnosis     ICD-10-CM    1  Biceps tendinitis of left upper extremity  M75 22    2  Medial epicondylitis of left elbow  M77 02                   Subjective: "I don't know, is it better?"      Objective: See treatment diary below      Assessment: See RE      Plan: Continue per plan of care        Precautions: Universal    HEP: Pec stretch, bicep stretch; eccentric WF, eccentric EF, doorway pec      Manuals    IASTM 10 10 10' 10 20 10 15' 13 20' 20'   TPR    5 5  5'      Cupping 5 moving, distal to CT 5 moving to ct     5'  5' 5'   JADE 10 10 10' 10  5   10' 10'   KT 5 5      5     Neuro Re-Ed             scap stabilization - FF and AB with bolster             Scap stab- alphabet w/weight in supine         2#,1x 2#,1x   Int/ext rot             SA pro                                                    Ther Ex             Eccentric grasp  BPW 3x 10           Eccentric EF  4# 3x 10 4# 3x 10  5# 2x 10 4# 2x 10 8# 2x10       Eccentric WF  3# 3x 10 3# 3x 10  5# 2x10 4# 2x10 7# 2x10       Eccentric pronation    2# 3x 10         Int/ext rot  rtb 3x 10 rtb 3x 10 rtb 3x 10  RTb 3x 10 RTB 3x 10  RTB 3x10 RTB 3x10   scap add  M, L 3x 10 B M, L 3x 10 B M, L 3x 10 B         Prone scapular Y's   3x10 B2x 10    2x 10                    Ther Activity                                       EDU Elbow block for night wear                                      Modalities             mhp 10 5 10' 5 5 5 5' 5 5' 5'

## 2020-09-02 ENCOUNTER — OFFICE VISIT (OUTPATIENT)
Dept: INTERNAL MEDICINE CLINIC | Facility: CLINIC | Age: 45
End: 2020-09-02
Payer: COMMERCIAL

## 2020-09-02 ENCOUNTER — APPOINTMENT (OUTPATIENT)
Dept: LAB | Facility: CLINIC | Age: 45
End: 2020-09-02
Payer: COMMERCIAL

## 2020-09-02 VITALS
DIASTOLIC BLOOD PRESSURE: 72 MMHG | TEMPERATURE: 97.7 F | BODY MASS INDEX: 30.73 KG/M2 | SYSTOLIC BLOOD PRESSURE: 110 MMHG | HEART RATE: 86 BPM | HEIGHT: 64 IN | WEIGHT: 180 LBS | OXYGEN SATURATION: 98 %

## 2020-09-02 DIAGNOSIS — M54.50 ACUTE RIGHT-SIDED LOW BACK PAIN WITHOUT SCIATICA: ICD-10-CM

## 2020-09-02 DIAGNOSIS — M25.551 CHRONIC RIGHT HIP PAIN: ICD-10-CM

## 2020-09-02 DIAGNOSIS — G89.29 CHRONIC RIGHT HIP PAIN: ICD-10-CM

## 2020-09-02 DIAGNOSIS — K64.4 EXTERNAL HEMORRHOIDS: ICD-10-CM

## 2020-09-02 DIAGNOSIS — R17 ELEVATED BILIRUBIN: Primary | ICD-10-CM

## 2020-09-02 DIAGNOSIS — Z00.00 LABORATORY EXAMINATION ORDERED AS PART OF A ROUTINE GENERAL MEDICAL EXAMINATION: ICD-10-CM

## 2020-09-02 DIAGNOSIS — K21.9 GASTROESOPHAGEAL REFLUX DISEASE, ESOPHAGITIS PRESENCE NOT SPECIFIED: ICD-10-CM

## 2020-09-02 LAB
ALBUMIN SERPL BCP-MCNC: 4.1 G/DL (ref 3.5–5)
ALP SERPL-CCNC: 77 U/L (ref 46–116)
ALT SERPL W P-5'-P-CCNC: 48 U/L (ref 12–78)
ANION GAP SERPL CALCULATED.3IONS-SCNC: 10 MMOL/L (ref 4–13)
AST SERPL W P-5'-P-CCNC: 40 U/L (ref 5–45)
BASOPHILS # BLD AUTO: 0.05 THOUSANDS/ΜL (ref 0–0.1)
BASOPHILS NFR BLD AUTO: 1 % (ref 0–1)
BILIRUB SERPL-MCNC: 0.44 MG/DL (ref 0.2–1)
BUN SERPL-MCNC: 18 MG/DL (ref 5–25)
CALCIUM SERPL-MCNC: 9 MG/DL (ref 8.3–10.1)
CHLORIDE SERPL-SCNC: 103 MMOL/L (ref 100–108)
CO2 SERPL-SCNC: 24 MMOL/L (ref 21–32)
CREAT SERPL-MCNC: 0.67 MG/DL (ref 0.6–1.3)
EOSINOPHIL # BLD AUTO: 0.17 THOUSAND/ΜL (ref 0–0.61)
EOSINOPHIL NFR BLD AUTO: 3 % (ref 0–6)
ERYTHROCYTE [DISTWIDTH] IN BLOOD BY AUTOMATED COUNT: 14 % (ref 11.6–15.1)
GFR SERPL CREATININE-BSD FRML MDRD: 117 ML/MIN/1.73SQ M
GLUCOSE SERPL-MCNC: 96 MG/DL (ref 65–140)
HCT VFR BLD AUTO: 42.3 % (ref 36.5–49.3)
HGB BLD-MCNC: 13.6 G/DL (ref 12–17)
IMM GRANULOCYTES # BLD AUTO: 0.03 THOUSAND/UL (ref 0–0.2)
IMM GRANULOCYTES NFR BLD AUTO: 1 % (ref 0–2)
LYMPHOCYTES # BLD AUTO: 1.94 THOUSANDS/ΜL (ref 0.6–4.47)
LYMPHOCYTES NFR BLD AUTO: 29 % (ref 14–44)
MCH RBC QN AUTO: 26.4 PG (ref 26.8–34.3)
MCHC RBC AUTO-ENTMCNC: 32.2 G/DL (ref 31.4–37.4)
MCV RBC AUTO: 82 FL (ref 82–98)
MONOCYTES # BLD AUTO: 0.69 THOUSAND/ΜL (ref 0.17–1.22)
MONOCYTES NFR BLD AUTO: 10 % (ref 4–12)
NEUTROPHILS # BLD AUTO: 3.75 THOUSANDS/ΜL (ref 1.85–7.62)
NEUTS SEG NFR BLD AUTO: 56 % (ref 43–75)
NRBC BLD AUTO-RTO: 0 /100 WBCS
PLATELET # BLD AUTO: 377 THOUSANDS/UL (ref 149–390)
PMV BLD AUTO: 9.6 FL (ref 8.9–12.7)
POTASSIUM SERPL-SCNC: 4.3 MMOL/L (ref 3.5–5.3)
PROT SERPL-MCNC: 7.8 G/DL (ref 6.4–8.2)
RBC # BLD AUTO: 5.16 MILLION/UL (ref 3.88–5.62)
SODIUM SERPL-SCNC: 137 MMOL/L (ref 136–145)
TSH SERPL DL<=0.05 MIU/L-ACNC: 0.81 UIU/ML (ref 0.36–3.74)
WBC # BLD AUTO: 6.63 THOUSAND/UL (ref 4.31–10.16)

## 2020-09-02 PROCEDURE — 84443 ASSAY THYROID STIM HORMONE: CPT | Performed by: INTERNAL MEDICINE

## 2020-09-02 PROCEDURE — 99214 OFFICE O/P EST MOD 30 MIN: CPT | Performed by: INTERNAL MEDICINE

## 2020-09-02 PROCEDURE — 85025 COMPLETE CBC W/AUTO DIFF WBC: CPT | Performed by: INTERNAL MEDICINE

## 2020-09-02 PROCEDURE — 3725F SCREEN DEPRESSION PERFORMED: CPT | Performed by: INTERNAL MEDICINE

## 2020-09-02 PROCEDURE — 36415 COLL VENOUS BLD VENIPUNCTURE: CPT | Performed by: INTERNAL MEDICINE

## 2020-09-02 PROCEDURE — 80053 COMPREHEN METABOLIC PANEL: CPT | Performed by: INTERNAL MEDICINE

## 2020-09-02 NOTE — PATIENT INSTRUCTIONS
May take ibuprofen 400 to 600 mg 3 times a day with food, as needed for severe back pain  Apply warm moist heat for 10 to 15 minutes, followed by back stretching exercises  Lower Back Exercises   WHAT YOU NEED TO KNOW:   What do I need to know about lower back exercises? Lower back exercises help heal and strengthen your back muscles to prevent another injury  Ask your healthcare provider if you need to see a physical therapist for more advanced exercises  · Do the exercises on a mat or firm surface  (not on a bed) to support your spine and prevent low back pain  · Move slowly and smoothly  Avoid fast or jerky motions  · Breathe normally  Do not hold your breath  · Stop if you feel pain  It is normal to feel some discomfort at first  Regular exercise will help decrease your discomfort over time  How do I perform lower back exercises safely? Your healthcare provider may recommend that you do back exercises 10 to 30 minutes each day  He may also recommend that you do exercises 1 to 3 times each day  Ask your healthcare provider which exercises are best for you and how often to do them  · Ankle pumps:  Lie on your back  Move your foot up (with your toes pointing toward your head)  Then, move your foot down (with your toes pointing away from you)  Repeat this exercise 10 times on each side  · Heel slides:  Lie on your back  Slowly bend one leg and then straighten it  Next, bend the other leg and then straighten it  Repeat 10 times on each side  · Pelvic tilt:  Lie on your back with your knees bent and feet flat on the floor  Place your arms in a relaxed position beside your body  Tighten the muscles of your abdomen and flatten your back against the floor  Hold for 5 seconds  Repeat 5 times  · Back stretch:  Lie on your back with your hands behind your head  Bend your knees and turn the lower half of your body to one side  Hold this position for 10 seconds   Repeat 3 times on each side  · Straight leg raises:  Lie on your back with one leg straight  Bend the other knee  Tighten your abdomen and then slowly lift the straight leg up about 6 to 12 inches off the floor  Hold for 1 to 5 seconds  Lower your leg slowly  Repeat 10 times on each leg  · Knee-to-chest:  Lie on your back with your knees bent and feet flat on the floor  Pull one of your knees toward your chest and hold it there for 5 seconds  Return your leg to the starting position  Lift the other knee toward your chest and hold for 5 seconds  Do this 5 times on each side  · Cat and camel:  Place your hands and knees on the floor  Arch your back upward toward the ceiling and lower your head  Round out your spine as much as you can  Hold for 5 seconds  Lift your head upward and push your chest downward toward the floor  Hold for 5 seconds  Do 3 sets or as directed  · Wall squats:  Stand with your back against a wall  Tighten the muscles of your abdomen  Slowly lower your body until your knees are bent at a 45 degree angle  Hold this position for 5 seconds  Slowly move back up to a standing position  Repeat 10 times  · Curl up:  Lie on your back with your knees bent and feet flat on the floor  Place your hands, palms down, underneath the curve in your lower back  Next, with your elbows on the floor, lift your shoulders and chest 2 to 3 inches  Keep your head in line with your shoulders  Hold this position for 5 seconds  When you can do this exercise without pain for 10 to 15 seconds, you may add a rotation  While your shoulders and chest are lifted off the ground, turn slightly to the left and hold  Repeat on the other side  · Bird dog:  Place your hands and knees on the floor  Keep your wrists directly below your shoulders and your knees directly below your hips  Pull your belly button in toward your spine  Do not flatten or arch your back   Tighten your abdominal muscles  Raise one arm straight out so that it is aligned with your head  Next, raise the leg opposite your arm  Hold this position for 15 seconds  Lower your arm and leg slowly and change sides  Do 5 sets  When should I seek immediate care? · You have severe pain that prevents you from moving  When should I contact my healthcare provider? · Your pain becomes worse  · You have new pain  · You have questions or concerns about your condition or care  CARE AGREEMENT:   You have the right to help plan your care  Learn about your health condition and how it may be treated  Discuss treatment options with your caregivers to decide what care you want to receive  You always have the right to refuse treatment  The above information is an  only  It is not intended as medical advice for individual conditions or treatments  Talk to your doctor, nurse or pharmacist before following any medical regimen to see if it is safe and effective for you  © 2017 [address] Information is for End User's use only and may not be sold, redistributed or otherwise used for commercial purposes  All illustrations and images included in CareNotes® are the copyrighted property of A [employer]  or [employer]

## 2020-09-02 NOTE — PROGRESS NOTES
Assessment/Plan:    Acute right-sided low back pain without sciatica  Discussed proper body mechanics especially at work  May apply warm moist heat followed by back stretching exercises  He will decide whether he will go to physical therapy  May continue with ibuprofen as needed  Instructed to stretch hamstrings as well  Chronic right hip pain  Intermittent, declined x-ray since no genital protection  External hemorrhoids  Discussed high fiber intake, adequate fluid intake  Acid reflux  Takes PPI prn  Elevated bilirubin  Repeat labs  Diagnoses and all orders for this visit:    Elevated bilirubin  -     CBC and differential  -     Comprehensive metabolic panel    External hemorrhoids  -     CBC and differential    Laboratory examination ordered as part of a routine general medical examination  -     CBC and differential  -     Comprehensive metabolic panel  -     TSH, 3rd generation with Free T4 reflex    Chronic right hip pain  -     Ambulatory referral to Physical Therapy; Future    Acute right-sided low back pain without sciatica  -     Ambulatory referral to Physical Therapy; Future    Gastroesophageal reflux disease, esophagitis presence not specified          Subjective:      Patient ID: Dari Kurtz is a 40 y o  male complains of low back pain  Back pain started about 2 days ago, woke up for work and experience right low back pain  He works third shift  He was unable to go to work yesterday  Pain mostly in the right lower back area  He also complains of intermittent right thigh pain  He has had chronic right hip pain  He did not want to do the x-ray because it did not have any genital protection  He reports pain behind his right thigh when he does stretching exercises  He applied a cream on his back which helped, also took high-dose ibuprofen which had helped with the pain  He plans to go back to work later today  He reports intermittent hemorrhoids    He did see a doctor for this in the past   He drinks a lot of water however has no fiber in his diet  He does not eat a lot of fruits or vegetables on a daily basis  He continues to go to physical therapy for his arm which she injured at work  His work is very physical       The following portions of the patient's history were reviewed and updated as appropriate: allergies, current medications, past medical history, past social history and problem list     Review of Systems   Constitutional: Negative for activity change and appetite change  Respiratory: Negative for cough and shortness of breath  Cardiovascular: Negative for chest pain  Gastrointestinal: Negative for abdominal pain, constipation and diarrhea  Genitourinary: Negative for dysuria  Musculoskeletal: Positive for arthralgias, back pain and gait problem  Neurological: Positive for weakness  Negative for numbness  Psychiatric/Behavioral: Negative for sleep disturbance  Objective:      /72   Pulse 86   Temp 97 7 °F (36 5 °C)   Ht 5' 4" (1 626 m)   Wt 81 6 kg (180 lb)   SpO2 98%   BMI 30 90 kg/m²          Physical Exam  Vitals signs and nursing note reviewed  Constitutional:       General: He is not in acute distress  Appearance: He is well-developed  HENT:      Head: Normocephalic  Eyes:      Pupils: Pupils are equal, round, and reactive to light  Neck:      Musculoskeletal: Normal range of motion  Pulmonary:      Effort: Pulmonary effort is normal  No respiratory distress  Musculoskeletal:      Thoracic back: He exhibits spasm  He exhibits no pain  Lumbar back: He exhibits decreased range of motion, pain and spasm  He exhibits no tenderness and no bony tenderness  Comments: Antalgic gait   Skin:     General: Skin is warm  Neurological:      Mental Status: He is alert and oriented to person, place, and time     Psychiatric:         Mood and Affect: Mood normal          Behavior: Behavior normal          BMI Counseling: Body mass index is 30 9 kg/m²  The BMI is above normal  Nutrition recommendations include 3-5 servings of fruits/vegetables daily  Exercise recommendations include exercising 3-5 times per week

## 2020-09-02 NOTE — LETTER
September 2, 2020     Patient: Paul Noriega   YOB: 1975   Date of Visit: 9/2/2020       To Whom it May Concern:    Paul Noriega is under my professional care  He was seen in my office on 9/2/2020  Please excuse him from work today, 9/2/20  He may return to work on 9/3/20  If you have any questions or concerns, please don't hesitate to call           Sincerely,          Vinh Oro MD        CC: No Recipients

## 2020-09-02 NOTE — ASSESSMENT & PLAN NOTE
Discussed proper body mechanics especially at work  May apply warm moist heat followed by back stretching exercises  He will decide whether he will go to physical therapy  May continue with ibuprofen as needed  Instructed to stretch hamstrings as well

## 2020-09-03 ENCOUNTER — OFFICE VISIT (OUTPATIENT)
Dept: OCCUPATIONAL THERAPY | Facility: CLINIC | Age: 45
End: 2020-09-03
Payer: COMMERCIAL

## 2020-09-03 DIAGNOSIS — M77.02 MEDIAL EPICONDYLITIS OF LEFT ELBOW: ICD-10-CM

## 2020-09-03 DIAGNOSIS — M75.22 BICEPS TENDINITIS OF LEFT UPPER EXTREMITY: Primary | ICD-10-CM

## 2020-09-03 PROCEDURE — 97140 MANUAL THERAPY 1/> REGIONS: CPT | Performed by: OCCUPATIONAL THERAPIST

## 2020-09-03 PROCEDURE — 97110 THERAPEUTIC EXERCISES: CPT | Performed by: OCCUPATIONAL THERAPIST

## 2020-09-03 NOTE — PROGRESS NOTES
Daily Note     Today's date: 9/3/2020  Patient name: Sigrid Rosas  : 1975  MRN: [id number]  Referring provider: iGna Mayer MD  Dx:   Encounter Diagnosis     ICD-10-CM    1  Biceps tendinitis of left upper extremity  M75 22    2  Medial epicondylitis of left elbow  M77 02                   Subjective: "I didn't feel the pulling like I usually do"      Objective: See treatment diary below      Assessment: Better tolerance today, unsure if hip pain is simply distracting or if improvements are genuine  Tolerated PREs well; some ulnar tension but this abated after 4-5 glides  Plan: Continue per plan of care        Precautions: Universal    HEP: Pec stretch, bicep stretch; eccentric WF, eccentric EF, doorway pec      Manuals 8/11 8/13 8/18 8/20 8/27 9/1 9/3 7/28 8/4 8/6   IASTM 10 10 10' 10 20 10 10 15 20' 20'   TPR    5 5        Cupping 5 moving, distal to CT 5 moving to ct       5' 5'   JADE 10 10 10' 10  5 5  10' 10'   KT 5 5      5     Neuro Re-Ed             scap stabilization - FF and AB with bolster             Scap stab- alphabet w/weight in supine         2#,1x 2#,1x   Int/ext rot             SA pro                                                    Ther Ex             Eccentric grasp  BPW 3x 10           Eccentric EF  4# 3x 10 4# 3x 10  5# 2x 10 4# 2x 10 5# 3x 10      Eccentric WF  3# 3x 10 3# 3x 10  5# 2x10 4# 2x10 5# 3x 10      Eccentric pronation    2# 3x 10   2# hammer 2x 10      Int/ext rot  rtb 3x 10 rtb 3x 10 rtb 3x 10  RTb 3x 10 RTB 3x 10  RTB 3x10 RTB 3x10   scap add  M, L 3x 10 B M, L 3x 10 B M, L 3x 10 B         Prone scapular Y's   3x10 B2x 10    2x 10 T, Y 2x 10                   Ther Activity                                       EDU Elbow block for night wear                                      Modalities             mhp 10 5 10' 5 5 5 5' 5 5' 5'

## 2020-09-08 ENCOUNTER — OFFICE VISIT (OUTPATIENT)
Dept: OCCUPATIONAL THERAPY | Facility: CLINIC | Age: 45
End: 2020-09-08
Payer: COMMERCIAL

## 2020-09-08 DIAGNOSIS — M75.22 BICEPS TENDINITIS OF LEFT UPPER EXTREMITY: Primary | ICD-10-CM

## 2020-09-08 DIAGNOSIS — M77.02 MEDIAL EPICONDYLITIS OF LEFT ELBOW: ICD-10-CM

## 2020-09-08 PROCEDURE — 97140 MANUAL THERAPY 1/> REGIONS: CPT | Performed by: OCCUPATIONAL THERAPIST

## 2020-09-08 PROCEDURE — 97110 THERAPEUTIC EXERCISES: CPT | Performed by: OCCUPATIONAL THERAPIST

## 2020-09-08 NOTE — PROGRESS NOTES
Daily Note      Today's date: 2020  Patient name: Irene Kam  : 1975  MRN: [id number]  Referring provider: Miya Cao MD  Dx:   Encounter Diagnosis     ICD-10-CM    1  Biceps tendinitis of left upper extremity  M75 22    2  Medial epicondylitis of left elbow  M77 02                   Subjective: "I didn't feel the pulling like I usually do"      Objective: See treatment diary below  [id number] unsupervised      Assessment:  Median and ulnar nerve tension during nerve gliding when at end range  Continued discomfort with resisted pronation, fatigue with wrist flexion exercises  Plan: Continue per plan of care        Precautions: Universal    HEP: Pec stretch, bicep stretch; eccentric WF, eccentric EF, doorway pec      Manuals 8/11 8/13 8/18 8/20 8/27 9/1 9/3 9/8 8/4 8/6   IASTM 10 10 10' 10 20 10 10 5' 20' 20'   TPR    5 5        Cupping 5 moving, distal to CT 5 moving to ct       5' 5'   JADE 10 10 10' 10  5 5 10' 10' 10'   KT 5 5           Neuro Re-Ed             scap stabilization - FF and AB with bolster             Scap stab- alphabet w/weight in supine         2#,1x 2#,1x   Int/ext rot             SA pro                                                    Ther Ex             Eccentric grasp  BPW 3x 10           Eccentric EF  4# 3x 10 4# 3x 10  5# 2x 10 4# 2x 10 5# 3x 10 5# 3x 10     Eccentric WF  3# 3x 10 3# 3x 10  5# 2x10 4# 2x10 5# 3x 10 5# 3x 10     Eccentric pronation    2# 3x 10   2# hammer 2x 10 2# hammer 2x 10     Int/ext rot  rtb 3x 10 rtb 3x 10 rtb 3x 10  RTb 3x 10 RTB 3x 10 RTB 3x 10 RTB 3x10 RTB 3x10   scap add  M, L 3x 10 B M, L 3x 10 B M, L 3x 10 B         Prone scapular Y's   3x10 B2x 10    2x 10 T, Y 2x 10                   Ther Activity                                       EDU Elbow block for night wear                                      Modalities             mhp 10 5 10' 5 5 5 5' 15' 5' 5'

## 2020-09-09 ENCOUNTER — OFFICE VISIT (OUTPATIENT)
Dept: OBGYN CLINIC | Facility: CLINIC | Age: 45
End: 2020-09-09
Payer: COMMERCIAL

## 2020-09-09 VITALS
BODY MASS INDEX: 30.56 KG/M2 | HEIGHT: 64 IN | DIASTOLIC BLOOD PRESSURE: 80 MMHG | WEIGHT: 179 LBS | SYSTOLIC BLOOD PRESSURE: 118 MMHG | HEART RATE: 64 BPM

## 2020-09-09 DIAGNOSIS — M75.22 BICEPS TENDINITIS OF LEFT UPPER EXTREMITY: Primary | ICD-10-CM

## 2020-09-09 DIAGNOSIS — M25.522 LEFT ELBOW PAIN: ICD-10-CM

## 2020-09-09 DIAGNOSIS — M77.02 MEDIAL EPICONDYLITIS OF LEFT ELBOW: ICD-10-CM

## 2020-09-09 PROCEDURE — 99213 OFFICE O/P EST LOW 20 MIN: CPT | Performed by: SURGERY

## 2020-09-09 RX ORDER — IBUPROFEN 800 MG/1
800 TABLET ORAL EVERY 8 HOURS PRN
Qty: 60 TABLET | Refills: 0 | Status: SHIPPED | OUTPATIENT
Start: 2020-09-09 | End: 2020-09-25

## 2020-09-09 NOTE — PROGRESS NOTES
ASSESSMENT/PLAN:      40 y o  male with left medial epicondylitis and left biceps tendinitis  A left medial epicondyle CSI was discussed today  This would calm down the inflammation surrounding the tendon  He declined at this time  Anabolic steroids will not help his issue and will likely cause tendon rupture overtime  A PRP injection may also be performed with the understanding this is not covered under insurance, Mayi Alvarado also declined  He may continue therapy once a week and perform a HEP, script was provided today  A refill of 800 MG of ibuprofen was prescribed and sent to his pharmacy electronically  Follow up as needed if symptoms worsen or fail to improve  The patient verbalized understanding of exam findings and treatment plan  We engaged in the shared decision-making process and treatment options were discussed at length with the patient  Surgical and conservative management discussed today along with risks and benefits  Diagnoses and all orders for this visit:    Biceps tendinitis of left upper extremity  -     Ambulatory referral to PT/OT hand therapy; Future    Medial epicondylitis of left elbow  -     Ambulatory referral to PT/OT hand therapy; Future    Left elbow pain  -     ibuprofen (MOTRIN) 800 mg tablet; Take 1 tablet (800 mg total) by mouth every 8 (eight) hours as needed for mild pain      Follow Up:  Return if symptoms worsen or fail to improve  To Do Next Visit:  Re-evaluation of current issue    ____________________________________________________________________________________________________________________________________________      CHIEF COMPLAINT:  Chief Complaint   Patient presents with    Left Elbow - Follow-up       SUBJECTIVE:  Tiana Burkett is a 40y o  year old  male who presents to the office today for a follow up regarding left elbow pain  Mayi Alvarado has been attending OT for medial epicondylitis and biceps tendinitis exercises  He has been attending formal PT   He notes that his medial elbow pain does improve while performing his therapy exercises  He notes that his symptoms do return  He is unsure of what to do from here  He does take Ibuprofen 800 MG for pain control  I have personally reviewed all the relevant PMH, PSH, SH, FH, Medications and allergies  PAST MEDICAL HISTORY:  Past Medical History:   Diagnosis Date    GERD (gastroesophageal reflux disease)     Umbilical hernia     last assessed 12/28/15       PAST SURGICAL HISTORY:  Past Surgical History:   Procedure Laterality Date    ANKLE SURGERY Right     closed treatment of trimalleolar ankle fracture, last assessed 12/28/15    HERNIA REPAIR      LASER ABLATION OF CONDYLOMAS N/A 4/1/2019    Procedure: EXCISION CONDYLOMA PERINEAL (PENILE/VAGINAL) WITH LASER CO2, excision of condylomata;   Surgeon: Lisa Nielsen MD;  [facility];  Service: Urology    MS REPAIR UMBILICAL ZNSD,4+M/A,HGLTP N/A 2/1/2016    Procedure: UMBILICAL HERNIA REPAIR ;  repaired with mesh Surgeon: Hong Moss DO;  Location[facility];  Service: General    WISDOM TOOTH EXTRACTION         FAMILY HISTORY:  Family History   Problem Relation Age of Onset    Alcohol abuse Neg Hx     Substance Abuse Neg Hx     Depression Neg Hx     Mental illness Neg Hx        SOCIAL HISTORY:  Social History     Tobacco Use    Smoking status: Current Every Day Smoker     Packs/day: 0 50     Types: Cigarettes    Smokeless tobacco: Never Used    Tobacco comment: current some day per [employer]   Substance Use Topics    Alcohol use: Yes     Frequency: 2-3 times a week     Drinks per session: 5 or 6     Comment: drinks of friday and saturday several drinks each night    Drug use: Yes     Types: Marijuana     Comment: marijuana use daily       MEDICATIONS:    Current Outpatient Medications:     ibuprofen (MOTRIN) 800 mg tablet, Take 1 tablet (800 mg total) by mouth every 8 (eight) hours as needed for mild pain, Disp: 60 tablet, Rfl: 0    RABEprazole (ACIPHEX) 20 MG tablet, Take 1 tablet (20 mg total) by mouth daily, Disp: 90 tablet, Rfl: 1    ALLERGIES:  No Known Allergies    REVIEW OF SYSTEMS:  Review of Systems   Constitutional: Negative for chills, fever and unexpected weight change  HENT: Negative for hearing loss, nosebleeds and sore throat  Eyes: Negative for pain, redness and visual disturbance  Respiratory: Negative for cough, shortness of breath and wheezing  Cardiovascular: Negative for chest pain, palpitations and leg swelling  Gastrointestinal: Negative for abdominal pain, nausea and vomiting  Endocrine: Negative for polydipsia and polyuria  Genitourinary: Negative for difficulty urinating and hematuria  Musculoskeletal: Negative for arthralgias, joint swelling and myalgias  Skin: Negative for rash and wound  Neurological: Negative for dizziness, numbness and headaches  Psychiatric/Behavioral: Negative for decreased concentration, dysphoric mood and suicidal ideas  The patient is not nervous/anxious          VITALS:  Vitals:    09/09/20 1625   BP: 118/80   Pulse: 64       LABS:  HgA1c: No results found for: HGBA1C  BMP:   Lab Results   Component Value Date    GLUCOSE 105 02/17/2017    CALCIUM 9 0 09/02/2020    K 4 3 09/02/2020    CO2 24 09/02/2020     09/02/2020    BUN 18 09/02/2020    CREATININE 0 67 09/02/2020       _____________________________________________________  PHYSICAL EXAMINATION:  General: well developed and well nourished, alert, oriented times 3 and appears comfortable  Psychiatric: Normal  HEENT: Normocephalic, Atraumatic Trachea Midline, No torticollis  Pulmonary: No audible wheezing or respiratory distress   Cardiovascular: No pitting edema, 2+ radial pulse   Skin: No masses, erythema, lacerations, fluctation, ulcerations  Neurovascular: Sensation Intact to the Median, Ulnar, Radial Nerve, Motor Intact to the Median, Ulnar, Radial Nerve and Pulses Intact  Musculoskeletal: Normal, except as noted in detailed exam and in HPI  MUSCULOSKELETAL EXAMINATION:    Left Elbow:    No swelling or deformity  Full range of motion with flexion, extension, supination and pronation  Positive tenderness to palpation over the Medial Epicondyle and biceps tendon  Pain with passive extension of the wrist with elbow fully extended  Pain with resisted wrist extension with elbow fully extended  Negative tinels over the ulnar nerve at the elbow      ___________________________________________________  STUDIES REVIEWED:  No new imaging to review           PROCEDURES PERFORMED:  Procedures  No Procedures performed today    _____________________________________________________      Rocky Beltre    I,:   Francisca Lewis am acting as a scribe while in the presence of the attending physician :        I,:   Elena Brar MD personally performed the services described in this documentation    as scribed in my presence :

## 2020-09-25 DIAGNOSIS — M25.522 LEFT ELBOW PAIN: ICD-10-CM

## 2020-09-25 DIAGNOSIS — K21.9 GASTROESOPHAGEAL REFLUX DISEASE, ESOPHAGITIS PRESENCE NOT SPECIFIED: ICD-10-CM

## 2020-09-25 RX ORDER — IBUPROFEN 800 MG/1
800 TABLET ORAL EVERY 8 HOURS PRN
Qty: 60 TABLET | Refills: 0 | Status: SHIPPED | OUTPATIENT
Start: 2020-09-25 | End: 2020-10-26

## 2020-09-25 RX ORDER — RABEPRAZOLE SODIUM 20 MG/1
TABLET, DELAYED RELEASE ORAL
Qty: 90 TABLET | Refills: 1 | Status: SHIPPED | OUTPATIENT
Start: 2020-09-25 | End: 2021-03-12

## 2020-10-19 ENCOUNTER — TELEMEDICINE (OUTPATIENT)
Dept: INTERNAL MEDICINE CLINIC | Facility: CLINIC | Age: 45
End: 2020-10-19
Payer: COMMERCIAL

## 2020-10-19 ENCOUNTER — TELEPHONE (OUTPATIENT)
Dept: INTERNAL MEDICINE CLINIC | Facility: CLINIC | Age: 45
End: 2020-10-19

## 2020-10-19 DIAGNOSIS — Z11.59 ENCOUNTER FOR SCREENING FOR OTHER VIRAL DISEASES: ICD-10-CM

## 2020-10-19 DIAGNOSIS — Z11.59 ENCOUNTER FOR SCREENING FOR OTHER VIRAL DISEASES: Primary | ICD-10-CM

## 2020-10-19 PROCEDURE — U0003 INFECTIOUS AGENT DETECTION BY NUCLEIC ACID (DNA OR RNA); SEVERE ACUTE RESPIRATORY SYNDROME CORONAVIRUS 2 (SARS-COV-2) (CORONAVIRUS DISEASE [COVID-19]), AMPLIFIED PROBE TECHNIQUE, MAKING USE OF HIGH THROUGHPUT TECHNOLOGIES AS DESCRIBED BY CMS-2020-01-R: HCPCS | Performed by: INTERNAL MEDICINE

## 2020-10-19 PROCEDURE — 99213 OFFICE O/P EST LOW 20 MIN: CPT | Performed by: INTERNAL MEDICINE

## 2020-10-19 PROCEDURE — 4004F PT TOBACCO SCREEN RCVD TLK: CPT | Performed by: INTERNAL MEDICINE

## 2020-10-20 ENCOUNTER — TELEPHONE (OUTPATIENT)
Dept: INTERNAL MEDICINE CLINIC | Facility: CLINIC | Age: 45
End: 2020-10-20

## 2020-10-20 LAB — SARS-COV-2 RNA SPEC QL NAA+PROBE: NOT DETECTED

## 2020-10-24 DIAGNOSIS — M25.522 LEFT ELBOW PAIN: ICD-10-CM

## 2020-10-26 RX ORDER — IBUPROFEN 800 MG/1
800 TABLET ORAL EVERY 8 HOURS PRN
Qty: 60 TABLET | Refills: 0 | Status: SHIPPED | OUTPATIENT
Start: 2020-10-26 | End: 2021-11-10 | Stop reason: ALTCHOICE

## 2020-11-05 ENCOUNTER — OFFICE VISIT (OUTPATIENT)
Dept: INTERNAL MEDICINE CLINIC | Facility: CLINIC | Age: 45
End: 2020-11-05
Payer: COMMERCIAL

## 2020-11-05 VITALS
BODY MASS INDEX: 30.87 KG/M2 | HEIGHT: 64 IN | RESPIRATION RATE: 18 BRPM | WEIGHT: 180.8 LBS | DIASTOLIC BLOOD PRESSURE: 72 MMHG | SYSTOLIC BLOOD PRESSURE: 122 MMHG | OXYGEN SATURATION: 98 % | HEART RATE: 75 BPM | TEMPERATURE: 97 F

## 2020-11-05 DIAGNOSIS — K21.9 GASTROESOPHAGEAL REFLUX DISEASE, UNSPECIFIED WHETHER ESOPHAGITIS PRESENT: ICD-10-CM

## 2020-11-05 DIAGNOSIS — Z00.00 HEALTH MAINTENANCE EXAMINATION: Primary | ICD-10-CM

## 2020-11-05 DIAGNOSIS — K64.4 EXTERNAL HEMORRHOIDS: ICD-10-CM

## 2020-11-05 DIAGNOSIS — M54.50 CHRONIC RIGHT-SIDED LOW BACK PAIN WITHOUT SCIATICA: ICD-10-CM

## 2020-11-05 DIAGNOSIS — G89.29 CHRONIC RIGHT-SIDED LOW BACK PAIN WITHOUT SCIATICA: ICD-10-CM

## 2020-11-05 PROBLEM — R17 ELEVATED BILIRUBIN: Status: RESOLVED | Noted: 2019-08-12 | Resolved: 2020-11-05

## 2020-11-05 PROBLEM — M25.50 MULTIPLE JOINT PAIN: Status: RESOLVED | Noted: 2018-05-30 | Resolved: 2020-11-05

## 2020-11-05 PROCEDURE — 99396 PREV VISIT EST AGE 40-64: CPT | Performed by: INTERNAL MEDICINE

## 2020-11-05 PROCEDURE — 4004F PT TOBACCO SCREEN RCVD TLK: CPT | Performed by: INTERNAL MEDICINE

## 2020-11-05 PROCEDURE — 3008F BODY MASS INDEX DOCD: CPT | Performed by: INTERNAL MEDICINE

## 2020-11-23 PROBLEM — K64.9 BLEEDING HEMORRHOIDS: Status: ACTIVE | Noted: 2020-11-23

## 2021-01-28 ENCOUNTER — OFFICE VISIT (OUTPATIENT)
Dept: INTERNAL MEDICINE CLINIC | Facility: CLINIC | Age: 46
End: 2021-01-28
Payer: COMMERCIAL

## 2021-01-28 ENCOUNTER — APPOINTMENT (OUTPATIENT)
Dept: LAB | Facility: CLINIC | Age: 46
End: 2021-01-28
Payer: COMMERCIAL

## 2021-01-28 VITALS
TEMPERATURE: 98.3 F | HEART RATE: 82 BPM | DIASTOLIC BLOOD PRESSURE: 84 MMHG | WEIGHT: 187 LBS | OXYGEN SATURATION: 98 % | BODY MASS INDEX: 31.92 KG/M2 | HEIGHT: 64 IN | SYSTOLIC BLOOD PRESSURE: 128 MMHG

## 2021-01-28 DIAGNOSIS — G25.81 RESTLESS LEGS SYNDROME: Primary | ICD-10-CM

## 2021-01-28 DIAGNOSIS — G47.09 OTHER INSOMNIA: ICD-10-CM

## 2021-01-28 LAB
BASOPHILS # BLD AUTO: 0.05 THOUSANDS/ΜL (ref 0–0.1)
BASOPHILS NFR BLD AUTO: 1 % (ref 0–1)
EOSINOPHIL # BLD AUTO: 0.13 THOUSAND/ΜL (ref 0–0.61)
EOSINOPHIL NFR BLD AUTO: 2 % (ref 0–6)
ERYTHROCYTE [DISTWIDTH] IN BLOOD BY AUTOMATED COUNT: 14.3 % (ref 11.6–15.1)
FERRITIN SERPL-MCNC: 3 NG/ML (ref 8–388)
HCT VFR BLD AUTO: 33.7 % (ref 36.5–49.3)
HGB BLD-MCNC: 10 G/DL (ref 12–17)
IMM GRANULOCYTES # BLD AUTO: 0.02 THOUSAND/UL (ref 0–0.2)
IMM GRANULOCYTES NFR BLD AUTO: 0 % (ref 0–2)
IRON SERPL-MCNC: 26 UG/DL (ref 65–175)
LYMPHOCYTES # BLD AUTO: 2.01 THOUSANDS/ΜL (ref 0.6–4.47)
LYMPHOCYTES NFR BLD AUTO: 30 % (ref 14–44)
MCH RBC QN AUTO: 21.9 PG (ref 26.8–34.3)
MCHC RBC AUTO-ENTMCNC: 29.7 G/DL (ref 31.4–37.4)
MCV RBC AUTO: 74 FL (ref 82–98)
MONOCYTES # BLD AUTO: 0.89 THOUSAND/ΜL (ref 0.17–1.22)
MONOCYTES NFR BLD AUTO: 13 % (ref 4–12)
NEUTROPHILS # BLD AUTO: 3.62 THOUSANDS/ΜL (ref 1.85–7.62)
NEUTS SEG NFR BLD AUTO: 54 % (ref 43–75)
NRBC BLD AUTO-RTO: 0 /100 WBCS
PLATELET # BLD AUTO: 414 THOUSANDS/UL (ref 149–390)
PMV BLD AUTO: 9.5 FL (ref 8.9–12.7)
RBC # BLD AUTO: 4.57 MILLION/UL (ref 3.88–5.62)
WBC # BLD AUTO: 6.72 THOUSAND/UL (ref 4.31–10.16)

## 2021-01-28 PROCEDURE — 36415 COLL VENOUS BLD VENIPUNCTURE: CPT | Performed by: INTERNAL MEDICINE

## 2021-01-28 PROCEDURE — 3725F SCREEN DEPRESSION PERFORMED: CPT | Performed by: INTERNAL MEDICINE

## 2021-01-28 PROCEDURE — 99213 OFFICE O/P EST LOW 20 MIN: CPT | Performed by: INTERNAL MEDICINE

## 2021-01-28 PROCEDURE — 4004F PT TOBACCO SCREEN RCVD TLK: CPT | Performed by: INTERNAL MEDICINE

## 2021-01-28 PROCEDURE — 85025 COMPLETE CBC W/AUTO DIFF WBC: CPT | Performed by: INTERNAL MEDICINE

## 2021-01-28 PROCEDURE — 82728 ASSAY OF FERRITIN: CPT | Performed by: INTERNAL MEDICINE

## 2021-01-28 PROCEDURE — 83540 ASSAY OF IRON: CPT | Performed by: INTERNAL MEDICINE

## 2021-01-28 PROCEDURE — 3008F BODY MASS INDEX DOCD: CPT | Performed by: INTERNAL MEDICINE

## 2021-01-28 RX ORDER — ROPINIROLE 0.5 MG/1
TABLET, FILM COATED ORAL
Qty: 30 TABLET | Refills: 1 | Status: SHIPPED | OUTPATIENT
Start: 2021-01-28 | End: 2021-02-19

## 2021-01-28 NOTE — H&P (VIEW-ONLY)
Assessment/Plan:    Restless legs syndrome  Symptoms worse recently, agrees to try medication again, previously on pramipexole  Trial of ropinirole  Check iron studies  May have component of PLMD, insomnia  Agrees to do sleep study  Acid reflux  Takes PPI prn only  Diagnoses and all orders for this visit:    Restless legs syndrome  -     CBC and differential  -     Ferritin  -     rOPINIRole (REQUIP) 0 5 mg tablet; Take 0 5 tab PO qHS x 3 days then 1 tab qHS  -     Diagnostic Sleep Study; Future  -     Iron    Other insomnia  -     Diagnostic Sleep Study; Future      Follow up as scheduled or as needed  Subjective:      Patient ID: Merian Councilman is a 39 y o  male who complains of RLS symptoms  He was diagnosed with restless legs several years ago  He thinks he may have taken medication, unsure which one and for how long  He did see neurology for this  It did not bother him in the past   However, recently he feels the twitching has worsened  He notices it at rest, bothersome when sleeping  It is now affecting his sleep  He tried 2 kinds of over the counter supplements which has not helped  He denies any pain, numbness or cramping  No issues with ambulation or balance  He reports his sleeps is "all messed up" since he has been working various hours, usually 2nd or 3rd shift  The following portions of the patient's history were reviewed and updated as appropriate: allergies, current medications, past medical history, past social history and problem list     Review of Systems   Constitutional: Negative for activity change and appetite change  Musculoskeletal: Negative for arthralgias and gait problem  Neurological: Negative for tremors, weakness and numbness  Psychiatric/Behavioral: Positive for sleep disturbance  The patient is not nervous/anxious            Objective:      /84   Pulse 82   Temp 98 3 °F (36 8 °C)   Ht 5' 4" (1 626 m)   Wt 84 8 kg (187 lb)   SpO2 98%   BMI 32 10 kg/m²          Physical Exam  Constitutional:       General: He is not in acute distress  Appearance: Normal appearance  He is not ill-appearing  HENT:      Head: Normocephalic and atraumatic  Eyes:      Pupils: Pupils are equal, round, and reactive to light  Pulmonary:      Effort: Pulmonary effort is normal  No respiratory distress  Musculoskeletal:         General: No swelling  Neurological:      General: No focal deficit present  Mental Status: He is alert and oriented to person, place, and time     Psychiatric:         Mood and Affect: Mood normal          Behavior: Behavior normal

## 2021-01-28 NOTE — PROGRESS NOTES
Assessment/Plan:    Restless legs syndrome  Symptoms worse recently, agrees to try medication again, previously on pramipexole  Trial of ropinirole  Check iron studies  May have component of PLMD, insomnia  Agrees to do sleep study  Acid reflux  Takes PPI prn only  Diagnoses and all orders for this visit:    Restless legs syndrome  -     CBC and differential  -     Ferritin  -     rOPINIRole (REQUIP) 0 5 mg tablet; Take 0 5 tab PO qHS x 3 days then 1 tab qHS  -     Diagnostic Sleep Study; Future  -     Iron    Other insomnia  -     Diagnostic Sleep Study; Future      Follow up as scheduled or as needed  Subjective:      Patient ID: Charu Rivero is a 39 y o  male who complains of RLS symptoms  He was diagnosed with restless legs several years ago  He thinks he may have taken medication, unsure which one and for how long  He did see neurology for this  It did not bother him in the past   However, recently he feels the twitching has worsened  He notices it at rest, bothersome when sleeping  It is now affecting his sleep  He tried 2 kinds of over the counter supplements which has not helped  He denies any pain, numbness or cramping  No issues with ambulation or balance  He reports his sleeps is "all messed up" since he has been working various hours, usually 2nd or 3rd shift  The following portions of the patient's history were reviewed and updated as appropriate: allergies, current medications, past medical history, past social history and problem list     Review of Systems   Constitutional: Negative for activity change and appetite change  Musculoskeletal: Negative for arthralgias and gait problem  Neurological: Negative for tremors, weakness and numbness  Psychiatric/Behavioral: Positive for sleep disturbance  The patient is not nervous/anxious            Objective:      /84   Pulse 82   Temp 98 3 °F (36 8 °C)   Ht 5' 4" (1 626 m)   Wt 84 8 kg (187 lb)   SpO2 98%   BMI 32 10 kg/m²          Physical Exam  Constitutional:       General: He is not in acute distress  Appearance: Normal appearance  He is not ill-appearing  HENT:      Head: Normocephalic and atraumatic  Eyes:      Pupils: Pupils are equal, round, and reactive to light  Pulmonary:      Effort: Pulmonary effort is normal  No respiratory distress  Musculoskeletal:         General: No swelling  Neurological:      General: No focal deficit present  Mental Status: He is alert and oriented to person, place, and time     Psychiatric:         Mood and Affect: Mood normal          Behavior: Behavior normal

## 2021-01-28 NOTE — ASSESSMENT & PLAN NOTE
Symptoms worse recently, agrees to try medication again, previously on pramipexole  Trial of ropinirole  Check iron studies  May have component of PLMD, insomnia  Agrees to do sleep study

## 2021-01-29 ENCOUNTER — TELEPHONE (OUTPATIENT)
Dept: OTHER | Facility: OTHER | Age: 46
End: 2021-01-29

## 2021-01-29 ENCOUNTER — TELEPHONE (OUTPATIENT)
Dept: INTERNAL MEDICINE CLINIC | Facility: CLINIC | Age: 46
End: 2021-01-29

## 2021-01-29 ENCOUNTER — DOCUMENTATION (OUTPATIENT)
Dept: FAMILY MEDICINE CLINIC | Facility: CLINIC | Age: 46
End: 2021-01-29

## 2021-01-29 DIAGNOSIS — D50.9 IRON DEFICIENCY ANEMIA, UNSPECIFIED IRON DEFICIENCY ANEMIA TYPE: ICD-10-CM

## 2021-01-29 DIAGNOSIS — D50.9 IRON DEFICIENCY ANEMIA, UNSPECIFIED IRON DEFICIENCY ANEMIA TYPE: Primary | ICD-10-CM

## 2021-01-29 RX ORDER — FERROUS SULFATE TAB EC 324 MG (65 MG FE EQUIVALENT) 324 (65 FE) MG
324 TABLET DELAYED RESPONSE ORAL
Qty: 60 TABLET | Refills: 0
Start: 2021-01-29 | End: 2021-01-29 | Stop reason: SDUPTHER

## 2021-01-29 RX ORDER — FERROUS SULFATE TAB EC 324 MG (65 MG FE EQUIVALENT) 324 (65 FE) MG
324 TABLET DELAYED RESPONSE ORAL
Qty: 60 TABLET | Refills: 0 | Status: SHIPPED | OUTPATIENT
Start: 2021-01-29 | End: 2021-03-01 | Stop reason: SDUPTHER

## 2021-01-29 NOTE — TELEPHONE ENCOUNTER
Patient notified  He wants to know what being anemic means? Is he going to be on the ferrous sulfate for life? He took 1/2 tab of the ropinirole last night and that did not help him at all

## 2021-01-29 NOTE — TELEPHONE ENCOUNTER
Patient is calling cause  is medication was not sent into the pharmacy from the office today medication in question is ferrous sulfate 324 (65 Fe) mg [  I told the patient if he doesn't hear back from the provider to call back

## 2021-01-29 NOTE — TELEPHONE ENCOUNTER
Lab results show that you are anemic  This may be a reason why your restless leg symptoms are worse  Start iron supplement, ferrous sulfate 325 mg daily for a week then increase to twice a day  Monitor for constipation and black stools  You may start the restless leg medication as discussed

## 2021-01-29 NOTE — TELEPHONE ENCOUNTER
Anemia means your hemoglobin is low  You can start eating foods high in iron, I can mail you a list   No, it does not mean you will need to take iron supplements forever  You only had one dose of the medication, give it time to work

## 2021-02-10 ENCOUNTER — ANESTHESIA EVENT (OUTPATIENT)
Dept: GASTROENTEROLOGY | Facility: AMBULARY SURGERY CENTER | Age: 46
End: 2021-02-10

## 2021-02-16 ENCOUNTER — TELEPHONE (OUTPATIENT)
Dept: INTERNAL MEDICINE CLINIC | Facility: CLINIC | Age: 46
End: 2021-02-16

## 2021-02-16 NOTE — TELEPHONE ENCOUNTER
Dr Gabo Hatfield used to give him his script for marijuana  He has had a medical marijuana card for four yrs  Since Dr Gabo Hatfield is no longer practicing he needs to find another Doc for his marijuana  Please advise

## 2021-02-17 ENCOUNTER — TELEPHONE (OUTPATIENT)
Dept: OTHER | Facility: OTHER | Age: 46
End: 2021-02-17

## 2021-02-19 DIAGNOSIS — G25.81 RESTLESS LEGS SYNDROME: ICD-10-CM

## 2021-02-19 RX ORDER — ROPINIROLE 0.5 MG/1
0.5 TABLET, FILM COATED ORAL
Qty: 90 TABLET | Refills: 0 | Status: SHIPPED | OUTPATIENT
Start: 2021-02-19 | End: 2021-05-17

## 2021-02-20 DIAGNOSIS — D50.9 IRON DEFICIENCY ANEMIA, UNSPECIFIED IRON DEFICIENCY ANEMIA TYPE: ICD-10-CM

## 2021-02-21 RX ORDER — FERROUS SULFATE TAB EC 324 MG (65 MG FE EQUIVALENT) 324 (65 FE) MG
324 TABLET DELAYED RESPONSE ORAL
Qty: 60 TABLET | Refills: 0 | OUTPATIENT
Start: 2021-02-21 | End: 2021-03-23

## 2021-02-24 ENCOUNTER — ANESTHESIA (OUTPATIENT)
Dept: GASTROENTEROLOGY | Facility: AMBULARY SURGERY CENTER | Age: 46
End: 2021-02-24

## 2021-02-24 ENCOUNTER — HOSPITAL ENCOUNTER (OUTPATIENT)
Dept: GASTROENTEROLOGY | Facility: AMBULARY SURGERY CENTER | Age: 46
Setting detail: OUTPATIENT SURGERY
Discharge: HOME | End: 2021-02-24
Attending: COLON & RECTAL SURGERY | Admitting: COLON & RECTAL SURGERY
Payer: COMMERCIAL

## 2021-02-24 VITALS — HEART RATE: 79 BPM

## 2021-02-24 VITALS
HEART RATE: 75 BPM | TEMPERATURE: 97.5 F | SYSTOLIC BLOOD PRESSURE: 102 MMHG | HEIGHT: 69 IN | OXYGEN SATURATION: 99 % | WEIGHT: 182 LBS | BODY MASS INDEX: 26.96 KG/M2 | DIASTOLIC BLOOD PRESSURE: 58 MMHG | RESPIRATION RATE: 18 BRPM

## 2021-02-24 DIAGNOSIS — Z12.11 COLON CANCER SCREENING: ICD-10-CM

## 2021-02-24 DIAGNOSIS — K64.4 EXTERNAL HEMORRHOIDS: ICD-10-CM

## 2021-02-24 PROCEDURE — 45380 COLONOSCOPY AND BIOPSY: CPT | Performed by: COLON & RECTAL SURGERY

## 2021-02-24 PROCEDURE — 88305 TISSUE EXAM BY PATHOLOGIST: CPT | Performed by: PATHOLOGY

## 2021-02-24 RX ORDER — SODIUM CHLORIDE, SODIUM LACTATE, POTASSIUM CHLORIDE, CALCIUM CHLORIDE 600; 310; 30; 20 MG/100ML; MG/100ML; MG/100ML; MG/100ML
125 INJECTION, SOLUTION INTRAVENOUS CONTINUOUS
Status: DISCONTINUED | OUTPATIENT
Start: 2021-02-24 | End: 2021-02-28 | Stop reason: HOSPADM

## 2021-02-24 RX ORDER — LIDOCAINE HYDROCHLORIDE 20 MG/ML
INJECTION, SOLUTION EPIDURAL; INFILTRATION; INTRACAUDAL; PERINEURAL AS NEEDED
Status: DISCONTINUED | OUTPATIENT
Start: 2021-02-24 | End: 2021-02-24

## 2021-02-24 RX ORDER — PROPOFOL 10 MG/ML
INJECTION, EMULSION INTRAVENOUS AS NEEDED
Status: DISCONTINUED | OUTPATIENT
Start: 2021-02-24 | End: 2021-02-24

## 2021-02-24 RX ORDER — PROPOFOL 10 MG/ML
INJECTION, EMULSION INTRAVENOUS CONTINUOUS PRN
Status: DISCONTINUED | OUTPATIENT
Start: 2021-02-24 | End: 2021-02-24

## 2021-02-24 RX ADMIN — SODIUM CHLORIDE, SODIUM LACTATE, POTASSIUM CHLORIDE, AND CALCIUM CHLORIDE: .6; .31; .03; .02 INJECTION, SOLUTION INTRAVENOUS at 10:49

## 2021-02-24 RX ADMIN — SODIUM CHLORIDE, SODIUM LACTATE, POTASSIUM CHLORIDE, AND CALCIUM CHLORIDE 125 ML/HR: .6; .31; .03; .02 INJECTION, SOLUTION INTRAVENOUS at 09:31

## 2021-02-24 RX ADMIN — LIDOCAINE HYDROCHLORIDE 50 MG: 20 INJECTION, SOLUTION EPIDURAL; INFILTRATION; INTRACAUDAL; PERINEURAL at 10:40

## 2021-02-24 RX ADMIN — PROPOFOL 100 MG: 10 INJECTION, EMULSION INTRAVENOUS at 10:40

## 2021-02-24 RX ADMIN — PROPOFOL 50 MG: 10 INJECTION, EMULSION INTRAVENOUS at 10:44

## 2021-02-24 RX ADMIN — PROPOFOL 150 MCG/KG/MIN: 10 INJECTION, EMULSION INTRAVENOUS at 10:40

## 2021-02-24 NOTE — ANESTHESIA POSTPROCEDURE EVALUATION
Post-Op Assessment Note    CV Status:  Stable  Pain Score: 0    Pain management: adequate     Mental Status:  Alert and awake   Hydration Status:  Euvolemic   PONV Controlled:  Controlled   Airway Patency:  Patent      Post Op Vitals Reviewed: Yes      Staff: CRNA         No complications documented      /60 (02/24/21 1100)    Temp      Pulse 73(sr) (02/24/21 1100)   Resp 20 (02/24/21 1100)    SpO2 99 % (02/24/21 1100)

## 2021-02-24 NOTE — ANESTHESIA PREPROCEDURE EVALUATION
Procedure:  COLONOSCOPY    Relevant Problems   GI/HEPATIC   (+) Acid reflux      MUSCULOSKELETAL   (+) Chronic right-sided low back pain without sciatica      NEURO/PSYCH   (+) Anxiety      Current smoker - 1 pack/wk    Physical Exam    Airway    Mallampati score: I  TM Distance: >3 FB  Neck ROM: full     Dental   Comment: Invisalign,     Cardiovascular      Pulmonary      Other Findings        Anesthesia Plan  ASA Score- 2     Anesthesia Type- IV sedation with anesthesia with ASA Monitors  Additional Monitors:   Airway Plan:           Plan Factors-Exercise tolerance (METS): >4 METS  Chart reviewed  Patient summary reviewed  Patient is a current smoker  Patient instructed to abstain from smoking on day of procedure  Patient did not smoke on day of surgery  Induction- intravenous  Postoperative Plan-     Informed Consent- Anesthetic plan and risks discussed with patient  I personally reviewed this patient with the CRNA  Discussed and agreed on the Anesthesia Plan with the CRNA  Elana Kelley

## 2021-02-24 NOTE — DISCHARGE INSTRUCTIONS
Colonoscopy   WHAT YOU NEED TO KNOW:   A colonoscopy is a procedure to examine the inside of your colon (intestine) with a scope  Polyps or tissue growths may have been removed during your colonoscopy  It is normal to feel bloated and to have some abdominal discomfort  You should be passing gas  If you have hemorrhoids or you had polyps removed, you may have a small amount of bleeding  DISCHARGE INSTRUCTIONS:   Call your doctor if:   · You have a large amount of bright red blood in your bowel movements  · Your abdomen is hard and firm and you have severe pain  · You have sudden trouble breathing  · You develop a rash or hives  · You have a fever within 24 hours of your procedure  · You have not had a bowel movement for 3 days after your procedure  · You have questions or concerns about your condition or care  After your colonoscopy:   · Do not lift, strain, or run  for 3 days  · Rest as much as possible  You have been given medicine to relax you  Do not  drive or make important decisions for at least 24 hours  Return to your normal activity as directed  · Relieve gas and discomfort from bloating  by lying on your left side with a heating pad on your abdomen  You may need to take short walks to help the gas move out  Eat small meals until bloating is relieved  If you had polyps removed: For 7 days after your procedure:  · Do not  take aspirin  · Do not  go on long car rides  Help prevent constipation:   · Eat a variety of healthy foods  Healthy foods include fruit, vegetables, whole-grain breads, low-fat dairy products, beans, lean meat, and fish  Ask if you need to be on a special diet  Your healthcare provider may recommend that you eat high-fiber foods such as cooked beans  Fiber helps you have regular bowel movements  · Drink liquids as directed  Adults should drink between 9 and 13 eight-ounce cups of liquid every day  Ask what amount is best for you   For most people, good liquids to drink are water, juice, and milk  · Exercise as directed  Talk to your healthcare provider about the best exercise plan for you  Exercise can help prevent constipation, decrease your blood pressure and improve your health  Follow up with your healthcare provider as directed:  Write down your questions so you remember to ask them during your visits  © Copyright [address] Information is for End User's use only and may not be sold, redistributed or otherwise used for commercial purposes  All illustrations and images included in CareNotes® are the copyrighted property of A D A M , Inc  or [address] [place], [place] [address]   The above information is an  only  It is not intended as medical advice for individual conditions or treatments  Talk to your doctor, nurse or pharmacist before following any medical regimen to see if it is safe and effective for you

## 2021-02-24 NOTE — INTERVAL H&P NOTE
H&P reviewed  After examining the patient I find no changes in the patients condition since the H&P had been written  Seen by Dr River Green in November for hemorrhoidal bleeding symptoms, patient requested any endoscopist given restricted schedule, we will proceed with colonoscopy today  Colonoscopy,discussed in a face-to-face, personal, informed consent process, the benefits, alternatives, risks including not limited to bleeding, missed lesion, perforation requiring emergent surgery discussed/understood     Vitals:    02/24/21 0928   BP: 126/84   Pulse: 75   Resp: 18   Temp: 97 5 °F (36 4 °C)   SpO2: 98%

## 2021-03-01 DIAGNOSIS — D50.9 IRON DEFICIENCY ANEMIA, UNSPECIFIED IRON DEFICIENCY ANEMIA TYPE: ICD-10-CM

## 2021-03-01 RX ORDER — FERROUS SULFATE TAB EC 324 MG (65 MG FE EQUIVALENT) 324 (65 FE) MG
324 TABLET DELAYED RESPONSE ORAL
Qty: 60 TABLET | Refills: 5 | Status: SHIPPED | OUTPATIENT
Start: 2021-03-01 | End: 2021-08-26

## 2021-03-04 ENCOUNTER — TELEPHONE (OUTPATIENT)
Dept: OBGYN CLINIC | Facility: HOSPITAL | Age: 46
End: 2021-03-04

## 2021-03-04 NOTE — TELEPHONE ENCOUNTER
Patient sees Dr Forrest Girard  Patient is calling to see if we received a medical records request for [employer] his medical marijuana company  I advised we did not as of yet  He asked for the fax # because he gave them one he found online        Provided [phone]

## 2021-03-08 PROBLEM — K64.8 INTERNAL HEMORRHOIDS: Status: ACTIVE | Noted: 2021-03-08

## 2021-03-11 DIAGNOSIS — K21.9 GASTROESOPHAGEAL REFLUX DISEASE: ICD-10-CM

## 2021-03-12 RX ORDER — RABEPRAZOLE SODIUM 20 MG/1
TABLET, DELAYED RELEASE ORAL
Qty: 90 TABLET | Refills: 1 | Status: SHIPPED | OUTPATIENT
Start: 2021-03-12 | End: 2021-08-26

## 2021-03-24 ENCOUNTER — HOSPITAL ENCOUNTER (OUTPATIENT)
Dept: SLEEP CENTER | Facility: CLINIC | Age: 46
Discharge: HOME | End: 2021-03-24
Payer: COMMERCIAL

## 2021-03-24 DIAGNOSIS — G47.09 OTHER INSOMNIA: ICD-10-CM

## 2021-03-24 DIAGNOSIS — G25.81 RESTLESS LEGS SYNDROME: ICD-10-CM

## 2021-03-24 PROCEDURE — 95810 POLYSOM 6/> YRS 4/> PARAM: CPT

## 2021-03-25 NOTE — PROGRESS NOTES
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  Caffeine use:NO    Alcohol:Nightshift workers after 7AM: Alcohol use:NO    Typical day for patient:YES       Study Documentation    Sleep Study Indications: BMI>30, RLS, Parasomnia    Sleep Study: Diagnostic   Snore: Moderate  Supplemental O2: no    O2 flow rate (L/min) range n/a  O2 flow rate (L/min) final n/a  Minimum SaO2 87%  Baseline SaO2 95%        Mode of Therapy:n/a    EKG abnormalities: no     EEG abnormalities: no    Sleep Study Recorded < 2 hours: N/A    Sleep Study Recorded > 2 hours but incomplete study: N/A    Sleep Study Recorded 6 hours but no sleep obtained: NO    Patient classification: employed       Post-Sleep Study    Medication used at bedtime or during sleep study:YES other prescription medications    Patient reports time it took to fall asleep:20 to 30 minutes    Patient reports waking up during study:1 to 2 times  Patient reports returning to sleep without difficulty  Patient reports sleeping 4 to 6 hours with dreaming  Patient reports sleep during study:typical    Patient rated sleepiness: Somewhat sleepy or tired    PAP treatment:no

## 2021-03-26 ENCOUNTER — TELEPHONE (OUTPATIENT)
Dept: SLEEP CENTER | Facility: CLINIC | Age: 46
End: 2021-03-26

## 2021-03-26 ENCOUNTER — TELEPHONE (OUTPATIENT)
Dept: INTERNAL MEDICINE CLINIC | Facility: CLINIC | Age: 46
End: 2021-03-26

## 2021-03-26 NOTE — TELEPHONE ENCOUNTER
Sleep study showed periodic limb movement, similar to restless legs  Are you taking your iron twice a day? Are you still taking the Requip at night? We can adjust the dose  Also, keep your appointment with the sleep specialist next month

## 2021-03-26 NOTE — TELEPHONE ENCOUNTER
Spoke with patient, advised sleep study reveals snoring, severe PLM, and sleep fragmentation  Advised can schedule consult with Dr Spaulding Dolores  Patient states he will call back in an hour

## 2021-04-26 ENCOUNTER — OFFICE VISIT (OUTPATIENT)
Dept: SLEEP CENTER | Facility: CLINIC | Age: 46
End: 2021-04-26
Payer: COMMERCIAL

## 2021-04-26 VITALS
BODY MASS INDEX: 27.99 KG/M2 | HEIGHT: 69 IN | DIASTOLIC BLOOD PRESSURE: 60 MMHG | SYSTOLIC BLOOD PRESSURE: 110 MMHG | WEIGHT: 189 LBS

## 2021-04-26 DIAGNOSIS — G47.61 PLMD (PERIODIC LIMB MOVEMENT DISORDER): ICD-10-CM

## 2021-04-26 DIAGNOSIS — E66.3 OVERWEIGHT (BMI 25.0-29.9): ICD-10-CM

## 2021-04-26 DIAGNOSIS — F12.90 MARIJUANA USE, CONTINUOUS: ICD-10-CM

## 2021-04-26 DIAGNOSIS — G47.8 UARS (UPPER AIRWAY RESISTANCE SYNDROME): ICD-10-CM

## 2021-04-26 DIAGNOSIS — M54.50 CHRONIC RIGHT-SIDED LOW BACK PAIN WITHOUT SCIATICA: ICD-10-CM

## 2021-04-26 DIAGNOSIS — R40.0 DAYTIME SLEEPINESS: ICD-10-CM

## 2021-04-26 DIAGNOSIS — E61.1 IRON DEFICIENCY: ICD-10-CM

## 2021-04-26 DIAGNOSIS — G89.29 CHRONIC RIGHT-SIDED LOW BACK PAIN WITHOUT SCIATICA: ICD-10-CM

## 2021-04-26 DIAGNOSIS — F41.9 ANXIETY: ICD-10-CM

## 2021-04-26 DIAGNOSIS — G25.81 RESTLESS LEGS SYNDROME: Primary | ICD-10-CM

## 2021-04-26 DIAGNOSIS — G47.26 SHIFT WORK SLEEP DISORDER: ICD-10-CM

## 2021-04-26 DIAGNOSIS — K64.9 BLEEDING HEMORRHOIDS: ICD-10-CM

## 2021-04-26 DIAGNOSIS — K21.9 GASTROESOPHAGEAL REFLUX DISEASE, UNSPECIFIED WHETHER ESOPHAGITIS PRESENT: ICD-10-CM

## 2021-04-26 DIAGNOSIS — G47.09 OTHER INSOMNIA: ICD-10-CM

## 2021-04-26 PROCEDURE — 4004F PT TOBACCO SCREEN RCVD TLK: CPT | Performed by: INTERNAL MEDICINE

## 2021-04-26 PROCEDURE — 3008F BODY MASS INDEX DOCD: CPT | Performed by: INTERNAL MEDICINE

## 2021-04-26 PROCEDURE — 99204 OFFICE O/P NEW MOD 45 MIN: CPT | Performed by: INTERNAL MEDICINE
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
## 2021-04-26 NOTE — PATIENT INSTRUCTIONS

## 2021-05-17 DIAGNOSIS — G25.81 RESTLESS LEGS SYNDROME: ICD-10-CM

## 2021-05-17 RX ORDER — ROPINIROLE 0.5 MG/1
0.5 TABLET, FILM COATED ORAL
Qty: 90 TABLET | Refills: 0 | Status: SHIPPED | OUTPATIENT
Start: 2021-05-17 | End: 2021-08-10

## 2021-08-10 DIAGNOSIS — G25.81 RESTLESS LEGS SYNDROME: ICD-10-CM

## 2021-08-10 RX ORDER — ROPINIROLE 0.5 MG/1
TABLET, FILM COATED ORAL
Qty: 90 TABLET | Refills: 0 | Status: SHIPPED | OUTPATIENT
Start: 2021-08-10 | End: 2021-08-26

## 2021-10-13 ENCOUNTER — CLINICAL SUPPORT (OUTPATIENT)
Dept: INTERNAL MEDICINE CLINIC | Facility: CLINIC | Age: 46
End: 2021-10-13
Payer: COMMERCIAL

## 2021-10-13 DIAGNOSIS — Z23 NEED FOR INFLUENZA VACCINATION: Primary | ICD-10-CM

## 2021-10-13 PROCEDURE — U0005 INFEC AGEN DETEC AMPLI PROBE: HCPCS | Performed by: INTERNAL MEDICINE

## 2021-10-13 PROCEDURE — U0003 INFECTIOUS AGENT DETECTION BY NUCLEIC ACID (DNA OR RNA); SEVERE ACUTE RESPIRATORY SYNDROME CORONAVIRUS 2 (SARS-COV-2) (CORONAVIRUS DISEASE [COVID-19]), AMPLIFIED PROBE TECHNIQUE, MAKING USE OF HIGH THROUGHPUT TECHNOLOGIES AS DESCRIBED BY CMS-2020-01-R: HCPCS | Performed by: INTERNAL MEDICINE

## 2021-10-13 PROCEDURE — 99211 OFF/OP EST MAY X REQ PHY/QHP: CPT

## 2021-10-14 LAB — SARS-COV-2 RNA RESP QL NAA+PROBE: NEGATIVE

## 2021-11-10 ENCOUNTER — TELEPHONE (OUTPATIENT)
Dept: INTERNAL MEDICINE CLINIC | Facility: CLINIC | Age: 46
End: 2021-11-10

## 2021-11-10 ENCOUNTER — OFFICE VISIT (OUTPATIENT)
Dept: INTERNAL MEDICINE CLINIC | Facility: CLINIC | Age: 46
End: 2021-11-10
Payer: COMMERCIAL

## 2021-11-10 VITALS
SYSTOLIC BLOOD PRESSURE: 118 MMHG | WEIGHT: 181 LBS | HEART RATE: 73 BPM | BODY MASS INDEX: 26.81 KG/M2 | RESPIRATION RATE: 16 BRPM | HEIGHT: 69 IN | TEMPERATURE: 98.8 F | OXYGEN SATURATION: 97 % | DIASTOLIC BLOOD PRESSURE: 74 MMHG

## 2021-11-10 DIAGNOSIS — G25.81 RESTLESS LEGS SYNDROME: ICD-10-CM

## 2021-11-10 DIAGNOSIS — G89.29 CHRONIC RIGHT-SIDED LOW BACK PAIN WITHOUT SCIATICA: ICD-10-CM

## 2021-11-10 DIAGNOSIS — G47.61 PERIODIC LIMB MOVEMENT DISORDER (PLMD): ICD-10-CM

## 2021-11-10 DIAGNOSIS — K21.9 GASTROESOPHAGEAL REFLUX DISEASE, UNSPECIFIED WHETHER ESOPHAGITIS PRESENT: ICD-10-CM

## 2021-11-10 DIAGNOSIS — Z00.00 HEALTH MAINTENANCE EXAMINATION: Primary | ICD-10-CM

## 2021-11-10 DIAGNOSIS — J34.2 DEVIATED SEPTUM: ICD-10-CM

## 2021-11-10 DIAGNOSIS — M25.522 LEFT ELBOW PAIN: ICD-10-CM

## 2021-11-10 DIAGNOSIS — Z00.00 LABORATORY EXAMINATION ORDERED AS PART OF A ROUTINE GENERAL MEDICAL EXAMINATION: ICD-10-CM

## 2021-11-10 DIAGNOSIS — K64.4 EXTERNAL HEMORRHOIDS: ICD-10-CM

## 2021-11-10 DIAGNOSIS — M54.50 CHRONIC RIGHT-SIDED LOW BACK PAIN WITHOUT SCIATICA: ICD-10-CM

## 2021-11-10 DIAGNOSIS — D50.8 IRON DEFICIENCY ANEMIA SECONDARY TO INADEQUATE DIETARY IRON INTAKE: ICD-10-CM

## 2021-11-10 PROCEDURE — 4004F PT TOBACCO SCREEN RCVD TLK: CPT | Performed by: INTERNAL MEDICINE

## 2021-11-10 PROCEDURE — 3008F BODY MASS INDEX DOCD: CPT | Performed by: INTERNAL MEDICINE

## 2021-11-10 PROCEDURE — 99396 PREV VISIT EST AGE 40-64: CPT | Performed by: INTERNAL MEDICINE

## 2021-11-10 RX ORDER — IBUPROFEN 800 MG/1
800 TABLET ORAL EVERY 8 HOURS PRN
Qty: 60 TABLET | Refills: 0
Start: 2021-11-10

## 2021-11-11 ENCOUNTER — APPOINTMENT (OUTPATIENT)
Dept: LAB | Facility: CLINIC | Age: 46
End: 2021-11-11
Payer: COMMERCIAL

## 2021-11-11 LAB
BASOPHILS # BLD AUTO: 0.03 THOUSANDS/ΜL (ref 0–0.1)
BASOPHILS NFR BLD AUTO: 1 % (ref 0–1)
CHOLEST SERPL-MCNC: 177 MG/DL (ref 50–200)
EOSINOPHIL # BLD AUTO: 0.06 THOUSAND/ΜL (ref 0–0.61)
EOSINOPHIL NFR BLD AUTO: 1 % (ref 0–6)
ERYTHROCYTE [DISTWIDTH] IN BLOOD BY AUTOMATED COUNT: 14 % (ref 11.6–15.1)
FERRITIN SERPL-MCNC: 14 NG/ML (ref 8–388)
HCT VFR BLD AUTO: 43.5 % (ref 36.5–49.3)
HDLC SERPL-MCNC: 59 MG/DL
HGB BLD-MCNC: 13.9 G/DL (ref 12–17)
IMM GRANULOCYTES # BLD AUTO: 0.03 THOUSAND/UL (ref 0–0.2)
IMM GRANULOCYTES NFR BLD AUTO: 1 % (ref 0–2)
IRON SERPL-MCNC: 130 UG/DL (ref 65–175)
LDLC SERPL CALC-MCNC: 89 MG/DL (ref 0–100)
LYMPHOCYTES # BLD AUTO: 1.53 THOUSANDS/ΜL (ref 0.6–4.47)
LYMPHOCYTES NFR BLD AUTO: 24 % (ref 14–44)
MCH RBC QN AUTO: 27.2 PG (ref 26.8–34.3)
MCHC RBC AUTO-ENTMCNC: 32 G/DL (ref 31.4–37.4)
MCV RBC AUTO: 85 FL (ref 82–98)
MONOCYTES # BLD AUTO: 0.73 THOUSAND/ΜL (ref 0.17–1.22)
MONOCYTES NFR BLD AUTO: 11 % (ref 4–12)
NEUTROPHILS # BLD AUTO: 4.12 THOUSANDS/ΜL (ref 1.85–7.62)
NEUTS SEG NFR BLD AUTO: 62 % (ref 43–75)
NONHDLC SERPL-MCNC: 118 MG/DL
NRBC BLD AUTO-RTO: 0 /100 WBCS
PLATELET # BLD AUTO: 320 THOUSANDS/UL (ref 149–390)
PMV BLD AUTO: 9 FL (ref 8.9–12.7)
RBC # BLD AUTO: 5.11 MILLION/UL (ref 3.88–5.62)
TIBC SERPL-MCNC: 529 UG/DL (ref 250–450)
TRIGL SERPL-MCNC: 145 MG/DL
TSH SERPL DL<=0.05 MIU/L-ACNC: 0.86 UIU/ML (ref 0.36–3.74)
WBC # BLD AUTO: 6.5 THOUSAND/UL (ref 4.31–10.16)

## 2021-11-11 PROCEDURE — 83550 IRON BINDING TEST: CPT | Performed by: INTERNAL MEDICINE

## 2021-11-11 PROCEDURE — 85025 COMPLETE CBC W/AUTO DIFF WBC: CPT | Performed by: INTERNAL MEDICINE

## 2021-11-11 PROCEDURE — 84443 ASSAY THYROID STIM HORMONE: CPT | Performed by: INTERNAL MEDICINE

## 2021-11-11 PROCEDURE — 82728 ASSAY OF FERRITIN: CPT | Performed by: INTERNAL MEDICINE

## 2021-11-11 PROCEDURE — 80061 LIPID PANEL: CPT | Performed by: INTERNAL MEDICINE

## 2021-11-11 PROCEDURE — 83540 ASSAY OF IRON: CPT | Performed by: INTERNAL MEDICINE

## 2021-11-11 PROCEDURE — 36415 COLL VENOUS BLD VENIPUNCTURE: CPT | Performed by: INTERNAL MEDICINE

## 2021-11-12 ENCOUNTER — TELEPHONE (OUTPATIENT)
Dept: INTERNAL MEDICINE CLINIC | Facility: CLINIC | Age: 46
End: 2021-11-12

## 2021-12-27 ENCOUNTER — TELEPHONE (OUTPATIENT)
Dept: INTERNAL MEDICINE CLINIC | Facility: CLINIC | Age: 46
End: 2021-12-27

## 2021-12-27 DIAGNOSIS — B34.9 VIRAL INFECTION, UNSPECIFIED: Primary | ICD-10-CM

## 2021-12-28 ENCOUNTER — TELEPHONE (OUTPATIENT)
Dept: INTERNAL MEDICINE CLINIC | Facility: CLINIC | Age: 46
End: 2021-12-28

## 2021-12-28 ENCOUNTER — TELEMEDICINE (OUTPATIENT)
Dept: INTERNAL MEDICINE CLINIC | Facility: CLINIC | Age: 46
End: 2021-12-28
Payer: COMMERCIAL

## 2021-12-28 DIAGNOSIS — B34.9 VIRAL INFECTION, UNSPECIFIED: Primary | ICD-10-CM

## 2021-12-28 PROCEDURE — 4004F PT TOBACCO SCREEN RCVD TLK: CPT | Performed by: INTERNAL MEDICINE

## 2021-12-28 PROCEDURE — 99213 OFFICE O/P EST LOW 20 MIN: CPT | Performed by: INTERNAL MEDICINE

## 2021-12-28 PROCEDURE — 87636 SARSCOV2 & INF A&B AMP PRB: CPT | Performed by: INTERNAL MEDICINE

## 2022-02-19 DIAGNOSIS — D50.9 IRON DEFICIENCY ANEMIA, UNSPECIFIED IRON DEFICIENCY ANEMIA TYPE: ICD-10-CM

## 2022-02-19 DIAGNOSIS — K21.9 GASTROESOPHAGEAL REFLUX DISEASE: ICD-10-CM

## 2022-02-21 RX ORDER — FERROUS SULFATE TAB EC 324 MG (65 MG FE EQUIVALENT) 324 (65 FE) MG
324 TABLET DELAYED RESPONSE ORAL
Qty: 180 TABLET | Refills: 1 | Status: SHIPPED | OUTPATIENT
Start: 2022-02-21 | End: 2022-03-23

## 2022-02-21 RX ORDER — RABEPRAZOLE SODIUM 20 MG/1
TABLET, DELAYED RELEASE ORAL
Qty: 90 TABLET | Refills: 1 | Status: SHIPPED | OUTPATIENT
Start: 2022-02-21 | End: 2022-06-23

## 2022-04-20 ENCOUNTER — APPOINTMENT (OUTPATIENT)
Dept: RADIOLOGY | Facility: AMBULARY SURGERY CENTER | Age: 47
End: 2022-04-20
Attending: ORTHOPAEDIC SURGERY
Payer: COMMERCIAL

## 2022-04-20 ENCOUNTER — OFFICE VISIT (OUTPATIENT)
Dept: OBGYN CLINIC | Facility: CLINIC | Age: 47
End: 2022-04-20
Payer: COMMERCIAL

## 2022-04-20 VITALS
DIASTOLIC BLOOD PRESSURE: 79 MMHG | SYSTOLIC BLOOD PRESSURE: 117 MMHG | HEART RATE: 71 BPM | HEIGHT: 69 IN | BODY MASS INDEX: 27.25 KG/M2 | WEIGHT: 184 LBS

## 2022-04-20 DIAGNOSIS — M25.562 LEFT KNEE PAIN, UNSPECIFIED CHRONICITY: ICD-10-CM

## 2022-04-20 DIAGNOSIS — M25.562 LEFT KNEE PAIN, UNSPECIFIED CHRONICITY: Primary | ICD-10-CM

## 2022-04-20 DIAGNOSIS — S83.242A TEAR OF MEDIAL MENISCUS OF LEFT KNEE, CURRENT, UNSPECIFIED TEAR TYPE, INITIAL ENCOUNTER: ICD-10-CM

## 2022-04-20 PROCEDURE — 73562 X-RAY EXAM OF KNEE 3: CPT

## 2022-04-20 PROCEDURE — 3008F BODY MASS INDEX DOCD: CPT | Performed by: ORTHOPAEDIC SURGERY

## 2022-04-20 PROCEDURE — 99214 OFFICE O/P EST MOD 30 MIN: CPT | Performed by: ORTHOPAEDIC SURGERY

## 2022-04-20 PROCEDURE — 4004F PT TOBACCO SCREEN RCVD TLK: CPT | Performed by: ORTHOPAEDIC SURGERY
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
## 2022-04-21 DIAGNOSIS — G25.81 RESTLESS LEGS SYNDROME: ICD-10-CM

## 2022-04-21 RX ORDER — ROPINIROLE 0.5 MG/1
TABLET, FILM COATED ORAL
Qty: 90 TABLET | Refills: 1 | Status: SHIPPED | OUTPATIENT
Start: 2022-04-21

## 2022-06-23 DIAGNOSIS — K21.9 GASTROESOPHAGEAL REFLUX DISEASE: ICD-10-CM

## 2022-06-23 RX ORDER — RABEPRAZOLE SODIUM 20 MG/1
TABLET, DELAYED RELEASE ORAL
Qty: 90 TABLET | Refills: 1 | Status: SHIPPED | OUTPATIENT
Start: 2022-06-23 | End: 2022-06-27 | Stop reason: SDUPTHER

## 2022-06-27 DIAGNOSIS — K21.9 GASTROESOPHAGEAL REFLUX DISEASE: ICD-10-CM

## 2022-06-27 RX ORDER — RABEPRAZOLE SODIUM 20 MG/1
20 TABLET, DELAYED RELEASE ORAL DAILY
Qty: 90 TABLET | Refills: 1 | Status: SHIPPED | OUTPATIENT
Start: 2022-06-27

## 2022-10-03 DIAGNOSIS — K21.9 GASTROESOPHAGEAL REFLUX DISEASE: ICD-10-CM

## 2022-10-03 RX ORDER — RABEPRAZOLE SODIUM 20 MG/1
20 TABLET, DELAYED RELEASE ORAL DAILY
Qty: 90 TABLET | Refills: 1 | Status: SHIPPED | OUTPATIENT
Start: 2022-10-03

## 2022-11-16 ENCOUNTER — OFFICE VISIT (OUTPATIENT)
Dept: INTERNAL MEDICINE CLINIC | Facility: CLINIC | Age: 47
End: 2022-11-16

## 2022-11-16 VITALS
HEIGHT: 69 IN | TEMPERATURE: 97.3 F | BODY MASS INDEX: 27.55 KG/M2 | WEIGHT: 186 LBS | OXYGEN SATURATION: 98 % | DIASTOLIC BLOOD PRESSURE: 70 MMHG | HEART RATE: 78 BPM | SYSTOLIC BLOOD PRESSURE: 110 MMHG

## 2022-11-16 DIAGNOSIS — E66.3 OVERWEIGHT (BMI 25.0-29.9): ICD-10-CM

## 2022-11-16 DIAGNOSIS — D22.9 MULTIPLE NEVI: ICD-10-CM

## 2022-11-16 DIAGNOSIS — D50.8 IRON DEFICIENCY ANEMIA SECONDARY TO INADEQUATE DIETARY IRON INTAKE: ICD-10-CM

## 2022-11-16 DIAGNOSIS — G47.8 UARS (UPPER AIRWAY RESISTANCE SYNDROME): ICD-10-CM

## 2022-11-16 DIAGNOSIS — K21.9 GASTROESOPHAGEAL REFLUX DISEASE, UNSPECIFIED WHETHER ESOPHAGITIS PRESENT: ICD-10-CM

## 2022-11-16 DIAGNOSIS — Z00.00 LABORATORY EXAMINATION ORDERED AS PART OF A ROUTINE GENERAL MEDICAL EXAMINATION: ICD-10-CM

## 2022-11-16 DIAGNOSIS — Z00.00 HEALTH MAINTENANCE EXAMINATION: Primary | ICD-10-CM

## 2022-11-16 DIAGNOSIS — G25.81 RESTLESS LEGS SYNDROME: ICD-10-CM

## 2022-11-16 PROBLEM — U07.1 COVID-19 VIRUS INFECTION: Status: ACTIVE | Noted: 2022-11-16

## 2022-11-16 PROBLEM — U07.1 COVID-19 VIRUS INFECTION: Status: RESOLVED | Noted: 2022-11-16 | Resolved: 2022-11-16

## 2022-11-16 NOTE — PROGRESS NOTES
Assessment/Plan:    UARS (upper airway resistance syndrome)  Seeing ENT  Planning for surgery, also interested in cosmetic surgery  Iron deficiency anemia secondary to inadequate dietary iron intake  Taking iron daily  Restless legs syndrome  Taking ropinirole qHS  GERD (gastroesophageal reflux disease)  Taking PPI daily  Chronic right-sided low back pain without sciatica  Stable  Overweight (BMI 25 0-29  9)  Stable weight  Internal hemorrhoids  Follow up with colorectal        Diagnoses and all orders for this visit:    Health maintenance examination  Comments:  Declined vaccinations  Rwcommend eye exam     UARS (upper airway resistance syndrome)    Iron deficiency anemia secondary to inadequate dietary iron intake  -     CBC and differential  -     Iron Panel (Includes Ferritin, Iron Sat%, Iron, and TIBC); Future    Gastroesophageal reflux disease, unspecified whether esophagitis present  -     TSH, 3rd generation with Free T4 reflex  -     Vitamin B12    Laboratory examination ordered as part of a routine general medical examination  -     CBC and differential  -     Comprehensive metabolic panel  -     Lipid panel  -     TSH, 3rd generation with Free T4 reflex  -     Vitamin B12  -     Iron Panel (Includes Ferritin, Iron Sat%, Iron, and TIBC); Future    Multiple nevi  -     Ambulatory Referral to Dermatology; Future    Restless legs syndrome    Overweight (BMI 25 0-29  9)    Follow up in 1 year or as needed  Subjective:      Patient ID: Edgar Dee is a 52 y o  male here for a physical     He feels well, no new complaints  He continues to experience frequent nasal congestion  He wanted a bump on his nose to be repaired to but this is cosmetic and so insurance will not cover it  He has not rescheduled his surgery yet  He reports no recent bleeding hemorrhoids  He had wanted it done and the operating room  He has not seen the surgeon recently        He continues to take his iron and Requip daily  He reports no issues with sleep  He admits binge drinking in the weekends  The following portions of the patient's history were reviewed and updated as appropriate: allergies, current medications, past medical history, past social history and problem list     Review of Systems   Constitutional: Negative for appetite change and fatigue  HENT: Positive for congestion  Negative for hearing loss and postnasal drip  Eyes: Positive for visual disturbance  Respiratory: Negative for cough, chest tightness and shortness of breath  Cardiovascular: Negative for chest pain, palpitations and leg swelling  Gastrointestinal: Negative for abdominal pain and constipation  Genitourinary: Negative for dysuria, frequency and urgency  Musculoskeletal: Negative for arthralgias  Skin: Negative for rash and wound  Neurological: Negative for dizziness, numbness and headaches  Psychiatric/Behavioral: Negative for sleep disturbance  The patient is not nervous/anxious  Objective:      /70 (BP Location: Left arm, Patient Position: Sitting, Cuff Size: Adult)   Pulse 78   Temp (!) 97 3 °F (36 3 °C)   Ht 5' 9" (1 753 m)   Wt 84 4 kg (186 lb)   SpO2 98%   BMI 27 47 kg/m²          Physical Exam  Vitals and nursing note reviewed  Constitutional:       Appearance: He is well-developed  HENT:      Head: Normocephalic and atraumatic  Right Ear: Tympanic membrane, ear canal and external ear normal       Left Ear: Tympanic membrane, ear canal and external ear normal    Eyes:      Conjunctiva/sclera: Conjunctivae normal       Pupils: Pupils are equal, round, and reactive to light  Cardiovascular:      Rate and Rhythm: Normal rate and regular rhythm  Heart sounds: Normal heart sounds  Pulmonary:      Effort: Pulmonary effort is normal       Breath sounds: No wheezing or rhonchi  Abdominal:      General: Bowel sounds are normal       Palpations: Abdomen is soft  Musculoskeletal:         General: No swelling  Cervical back: Neck supple  Right lower leg: No edema  Left lower leg: No edema  Skin:     General: Skin is warm  Findings: No rash  Neurological:      General: No focal deficit present  Mental Status: He is alert and oriented to person, place, and time  Psychiatric:         Mood and Affect: Mood and affect normal          Behavior: Behavior normal            BMI Counseling: Body mass index is 27 47 kg/m²  The BMI is above normal  Nutrition recommendations include 3-5 servings of fruits/vegetables daily

## 2022-11-19 DIAGNOSIS — G25.81 RESTLESS LEGS SYNDROME: ICD-10-CM

## 2022-11-21 RX ORDER — ROPINIROLE 0.5 MG/1
TABLET, FILM COATED ORAL
Qty: 90 TABLET | Refills: 1 | Status: SHIPPED | OUTPATIENT
Start: 2022-11-21

## 2022-12-12 ENCOUNTER — APPOINTMENT (OUTPATIENT)
Dept: RADIOLOGY | Facility: AMBULARY SURGERY CENTER | Age: 47
End: 2022-12-12
Attending: ORTHOPAEDIC SURGERY

## 2022-12-12 ENCOUNTER — TELEPHONE (OUTPATIENT)
Dept: INTERNAL MEDICINE CLINIC | Facility: CLINIC | Age: 47
End: 2022-12-12

## 2022-12-12 ENCOUNTER — OFFICE VISIT (OUTPATIENT)
Dept: OBGYN CLINIC | Facility: CLINIC | Age: 47
End: 2022-12-12

## 2022-12-12 ENCOUNTER — TELEPHONE (OUTPATIENT)
Dept: OBGYN CLINIC | Facility: HOSPITAL | Age: 47
End: 2022-12-12

## 2022-12-12 VITALS
HEART RATE: 76 BPM | HEIGHT: 69 IN | WEIGHT: 184 LBS | DIASTOLIC BLOOD PRESSURE: 82 MMHG | BODY MASS INDEX: 27.25 KG/M2 | SYSTOLIC BLOOD PRESSURE: 117 MMHG

## 2022-12-12 DIAGNOSIS — M25.511 ACUTE PAIN OF RIGHT SHOULDER: ICD-10-CM

## 2022-12-12 DIAGNOSIS — M75.01 ADHESIVE CAPSULITIS OF RIGHT SHOULDER: Primary | ICD-10-CM

## 2022-12-12 DIAGNOSIS — M75.21 BICEPS TENDONITIS ON RIGHT: ICD-10-CM

## 2022-12-12 RX ORDER — METHYLPREDNISOLONE 4 MG/1
TABLET ORAL
Qty: 1 EACH | Refills: 0 | Status: SHIPPED | OUTPATIENT
Start: 2022-12-12

## 2022-12-12 RX ORDER — ROPIVACAINE HYDROCHLORIDE 5 MG/ML
10 INJECTION, SOLUTION EPIDURAL; INFILTRATION; PERINEURAL
Status: COMPLETED | OUTPATIENT
Start: 2022-12-12 | End: 2022-12-12

## 2022-12-12 RX ORDER — TRIAMCINOLONE ACETONIDE 40 MG/ML
40 INJECTION, SUSPENSION INTRA-ARTICULAR; INTRAMUSCULAR
Status: COMPLETED | OUTPATIENT
Start: 2022-12-12 | End: 2022-12-12

## 2022-12-12 RX ADMIN — ROPIVACAINE HYDROCHLORIDE 10 ML: 5 INJECTION, SOLUTION EPIDURAL; INFILTRATION; PERINEURAL at 11:01

## 2022-12-12 RX ADMIN — TRIAMCINOLONE ACETONIDE 40 MG: 40 INJECTION, SUSPENSION INTRA-ARTICULAR; INTRAMUSCULAR at 11:01

## 2022-12-12 NOTE — TELEPHONE ENCOUNTER
Any immediate pain relief would have been from the anesthetic which lasts only a few hours, if any  If he had no reduction in pain then this may mean the area of pain in the shoulder may be different than what was injected, however he still needs to wait up to a week to see if the injection today helps

## 2022-12-12 NOTE — TELEPHONE ENCOUNTER
Caller: Patient    Doctor: Dr Shilpa Mar    Reason for call: Patient calling that he had an injection in his shoulder around 11AM and his pain remains unchanged  He was wondering if it requires more time for the injection to work    Patient asking to speak to clinical team     Call back#: [phone]

## 2022-12-12 NOTE — TELEPHONE ENCOUNTER
Caller: Shima Atkins    Doctor: Rasheed Scripture    Reason for call: patient is calling in reference to the pain in his shoulder  Patient states the pain is still excruciating and he was advised that he would have relief within an hour  Pain is severe    Please advise    Call back#: [phone]

## 2022-12-12 NOTE — TELEPHONE ENCOUNTER
If he does not want to come in for an appt to be evaluated, I can refer him to Ortho  I can be a torn tendon or just a strain

## 2022-12-12 NOTE — LETTER
December 12, 2022     Patient: Geovanni Kumar  YOB: 1975  Date of Visit: 12/12/2022      To Whom it May Concern:    Dari Kurtz is under my professional care  Meme Harris was seen in my office on 12/12/2022  Meme Harris may return to work on 12/13/2022  If you have any questions or concerns, please don't hesitate to call           Sincerely,          Louisa Muro DO        CC: No Recipients

## 2022-12-12 NOTE — TELEPHONE ENCOUNTER
Pt has extreme pain in his right bicep that radiates up into his shoulder  Pt states pain is 9/10  Started on Saturday  No known injury  Pt states he hasn't done any heavy lifting out of the ordinary  Taking 800 mg of Ibuprofen and Nyquil to sleep  Offered pt appt today, but declined  Please advise

## 2022-12-12 NOTE — TELEPHONE ENCOUNTER
Caller: Melly Nurse - patient    Doctor: Gareth Robertson    Reason for call: Patient is still in agonizing pain, he wanted the Dr to know that, if he doesn't feel better tomorrow he will call back      Call back#: [phone]
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
## 2022-12-13 DIAGNOSIS — M75.01 ADHESIVE CAPSULITIS OF RIGHT SHOULDER: Primary | ICD-10-CM

## 2022-12-13 NOTE — TELEPHONE ENCOUNTER
Caller: Alfonso Mendoza - patient     Doctor: Bishop Jeffers     Reason for call: Patient is still in agonizing pain of 9, he wanted the Dr to know that he is taking the medication Medrol Dosepack  He is not able to use the arm very well  Patient is asking is he can have another work note to keep him out of work until Thursday?   He will  the note if the office can call when the note is ready      Call back#: [phone]

## 2022-12-13 NOTE — TELEPHONE ENCOUNTER
Patient provided above information and verbalized  understanding  I transferred him to Central Scheduling to schedule MRI  Patient will call back with date and time for authorization of MRI

## 2022-12-19 ENCOUNTER — OFFICE VISIT (OUTPATIENT)
Dept: OBGYN CLINIC | Facility: CLINIC | Age: 47
End: 2022-12-19

## 2022-12-19 VITALS
HEIGHT: 69 IN | DIASTOLIC BLOOD PRESSURE: 75 MMHG | BODY MASS INDEX: 27.25 KG/M2 | HEART RATE: 76 BPM | WEIGHT: 184 LBS | SYSTOLIC BLOOD PRESSURE: 112 MMHG

## 2022-12-19 DIAGNOSIS — K21.9 GASTROESOPHAGEAL REFLUX DISEASE: ICD-10-CM

## 2022-12-19 DIAGNOSIS — M75.21 BICEPS TENDONITIS ON RIGHT: Primary | ICD-10-CM

## 2022-12-19 DIAGNOSIS — M75.01 ADHESIVE CAPSULITIS OF RIGHT SHOULDER: ICD-10-CM

## 2022-12-19 RX ORDER — RABEPRAZOLE SODIUM 20 MG/1
20 TABLET, DELAYED RELEASE ORAL DAILY
Qty: 90 TABLET | Refills: 1 | Status: SHIPPED | OUTPATIENT
Start: 2022-12-19
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
## 2022-12-23 ENCOUNTER — HOSPITAL ENCOUNTER (OUTPATIENT)
Dept: RADIOLOGY | Facility: HOSPITAL | Age: 47
Discharge: HOME | End: 2022-12-23
Attending: ORTHOPAEDIC SURGERY

## 2022-12-23 DIAGNOSIS — M75.01 ADHESIVE CAPSULITIS OF RIGHT SHOULDER: ICD-10-CM

## 2022-12-30 ENCOUNTER — CONSULT (OUTPATIENT)
Dept: OBGYN CLINIC | Facility: CLINIC | Age: 47
End: 2022-12-30

## 2022-12-30 VITALS
HEART RATE: 65 BPM | WEIGHT: 184 LBS | BODY MASS INDEX: 27.25 KG/M2 | DIASTOLIC BLOOD PRESSURE: 90 MMHG | SYSTOLIC BLOOD PRESSURE: 149 MMHG | HEIGHT: 69 IN

## 2022-12-30 DIAGNOSIS — M75.21 BICEPS TENDONITIS ON RIGHT: Primary | ICD-10-CM

## 2022-12-30 DIAGNOSIS — M75.81 ROTATOR CUFF TENDINITIS, RIGHT: ICD-10-CM

## 2022-12-30 DIAGNOSIS — S43.431A SUPERIOR GLENOID LABRUM LESION OF RIGHT SHOULDER, INITIAL ENCOUNTER: ICD-10-CM
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
## 2023-02-07 ENCOUNTER — TELEPHONE (OUTPATIENT)
Dept: INTERNAL MEDICINE CLINIC | Facility: CLINIC | Age: 48
End: 2023-02-07

## 2023-02-07 NOTE — TELEPHONE ENCOUNTER
Any eye pain or discharge? Any blurring of vision? Is the redness getting better or worse? Are you still having the headaches?
Dennis and sent mychart
I saw the picture you sent, thank you  It does look like a popped blood vessel  It should start to pool at the bottom by tomorrow  Since you have no pain, vision problems, discharge etc  Nothing to do, it will go away on its own in the the next few days  You can go to your eye doctor if you want, to make sure everything is ok 
It might be a small blood vessel that burst  You can send me a picture via [email or web address]  You can continue taking Tylenol or ibuprofen as needed for the headache 
No pain or discharge   No blurry vision   No change I color since 230 this morning   Yes, still having headache
Patient went to bed at 1900 and woke up at 0230 to go to work  He woke up with a headache and his right eye red on the out white closer to ear  He took his heartburn medications, restless leg medication, and 400mg ibu before bed  He went to work, it did not get worse or better  Please advise 
Pt notified
Spoke with patient  Will send photo through [address] [place] [address] [phone] 
(4) rarely moist

## 2023-02-10 ENCOUNTER — HOSPITAL ENCOUNTER (EMERGENCY)
Facility: HOSPITAL | Age: 48
Discharge: HOME | End: 2023-02-10
Attending: EMERGENCY MEDICINE

## 2023-02-10 VITALS
TEMPERATURE: 98.9 F | DIASTOLIC BLOOD PRESSURE: 89 MMHG | OXYGEN SATURATION: 97 % | SYSTOLIC BLOOD PRESSURE: 136 MMHG | HEART RATE: 85 BPM | RESPIRATION RATE: 16 BRPM

## 2023-02-10 DIAGNOSIS — K52.9 ENTERITIS: Primary | ICD-10-CM

## 2023-02-10 LAB
ALBUMIN SERPL BCP-MCNC: 4.9 G/DL (ref 3.5–5)
ALP SERPL-CCNC: 67 U/L (ref 34–104)
ALT SERPL W P-5'-P-CCNC: 42 U/L (ref 7–52)
ANION GAP SERPL CALCULATED.3IONS-SCNC: 10 MMOL/L (ref 4–13)
AST SERPL W P-5'-P-CCNC: 26 U/L (ref 13–39)
BASOPHILS # BLD AUTO: 0.02 THOUSANDS/ÂΜL (ref 0–0.1)
BASOPHILS NFR BLD AUTO: 0 % (ref 0–1)
BILIRUB SERPL-MCNC: 1.16 MG/DL (ref 0.2–1)
BUN SERPL-MCNC: 14 MG/DL (ref 5–25)
CALCIUM SERPL-MCNC: 9.6 MG/DL (ref 8.4–10.2)
CHLORIDE SERPL-SCNC: 102 MMOL/L (ref 96–108)
CO2 SERPL-SCNC: 25 MMOL/L (ref 21–32)
CREAT SERPL-MCNC: 0.91 MG/DL (ref 0.6–1.3)
EOSINOPHIL # BLD AUTO: 0.03 THOUSAND/ÂΜL (ref 0–0.61)
EOSINOPHIL NFR BLD AUTO: 0 % (ref 0–6)
ERYTHROCYTE [DISTWIDTH] IN BLOOD BY AUTOMATED COUNT: 14.4 % (ref 11.6–15.1)
GFR SERPL CREATININE-BSD FRML MDRD: 100 ML/MIN/1.73SQ M
GLUCOSE SERPL-MCNC: 101 MG/DL (ref 65–140)
HCT VFR BLD AUTO: 47.3 % (ref 36.5–49.3)
HGB BLD-MCNC: 15.7 G/DL (ref 12–17)
HOLD SPECIMEN: NORMAL
IMM GRANULOCYTES # BLD AUTO: 0.04 THOUSAND/UL (ref 0–0.2)
IMM GRANULOCYTES NFR BLD AUTO: 1 % (ref 0–2)
LIPASE SERPL-CCNC: 20 U/L (ref 11–82)
LYMPHOCYTES # BLD AUTO: 0.84 THOUSANDS/ÂΜL (ref 0.6–4.47)
LYMPHOCYTES NFR BLD AUTO: 11 % (ref 14–44)
MCH RBC QN AUTO: 27.7 PG (ref 26.8–34.3)
MCHC RBC AUTO-ENTMCNC: 33.2 G/DL (ref 31.4–37.4)
MCV RBC AUTO: 84 FL (ref 82–98)
MONOCYTES # BLD AUTO: 0.76 THOUSAND/ÂΜL (ref 0.17–1.22)
MONOCYTES NFR BLD AUTO: 10 % (ref 4–12)
NEUTROPHILS # BLD AUTO: 5.82 THOUSANDS/ÂΜL (ref 1.85–7.62)
NEUTS SEG NFR BLD AUTO: 78 % (ref 43–75)
NRBC BLD AUTO-RTO: 0 /100 WBCS
PLATELET # BLD AUTO: 310 THOUSANDS/UL (ref 149–390)
PMV BLD AUTO: 8.7 FL (ref 8.9–12.7)
POTASSIUM SERPL-SCNC: 3.8 MMOL/L (ref 3.5–5.3)
PROT SERPL-MCNC: 8.2 G/DL (ref 6.4–8.4)
RBC # BLD AUTO: 5.66 MILLION/UL (ref 3.88–5.62)
SODIUM SERPL-SCNC: 137 MMOL/L (ref 135–147)
WBC # BLD AUTO: 7.51 THOUSAND/UL (ref 4.31–10.16)

## 2023-02-10 RX ADMIN — SODIUM CHLORIDE 1000 ML: 0.9 INJECTION, SOLUTION INTRAVENOUS at 13:11

## 2023-02-10 RX ADMIN — SODIUM CHLORIDE 1000 ML: 0.9 INJECTION, SOLUTION INTRAVENOUS at 13:40
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
## 2023-02-21 ENCOUNTER — TELEPHONE (OUTPATIENT)
Dept: INTERNAL MEDICINE CLINIC | Facility: CLINIC | Age: 48
End: 2023-02-21

## 2023-02-21 NOTE — TELEPHONE ENCOUNTER
I ordered routine blood work at your visit last November, did you have this done? Fasting? You had blood work done at the ER recently, is that what you were referring to?

## 2023-02-28 DIAGNOSIS — D50.9 IRON DEFICIENCY ANEMIA, UNSPECIFIED IRON DEFICIENCY ANEMIA TYPE: ICD-10-CM

## 2023-03-01 RX ORDER — FERROUS SULFATE TAB EC 324 MG (65 MG FE EQUIVALENT) 324 (65 FE) MG
324 TABLET DELAYED RESPONSE ORAL
Qty: 180 TABLET | Refills: 1 | Status: SHIPPED | OUTPATIENT
Start: 2023-03-01 | End: 2023-03-31

## 2023-05-17 DIAGNOSIS — G25.81 RESTLESS LEGS SYNDROME: ICD-10-CM

## 2023-05-17 RX ORDER — ROPINIROLE 0.5 MG/1
TABLET, FILM COATED ORAL
Qty: 90 TABLET | Refills: 1 | Status: SHIPPED | OUTPATIENT
Start: 2023-05-17

## 2023-06-15 DIAGNOSIS — K21.9 GASTROESOPHAGEAL REFLUX DISEASE: ICD-10-CM

## 2023-06-15 RX ORDER — RABEPRAZOLE SODIUM 20 MG/1
TABLET, DELAYED RELEASE ORAL
Qty: 90 TABLET | Refills: 1 | Status: SHIPPED | OUTPATIENT
Start: 2023-06-15

## 2023-08-23 DIAGNOSIS — D50.9 IRON DEFICIENCY ANEMIA, UNSPECIFIED IRON DEFICIENCY ANEMIA TYPE: ICD-10-CM

## 2023-08-23 RX ORDER — FERROUS SULFATE TAB EC 324 MG (65 MG FE EQUIVALENT) 324 (65 FE) MG
324 TABLET DELAYED RESPONSE ORAL
Qty: 180 TABLET | Refills: 0 | Status: SHIPPED | OUTPATIENT
Start: 2023-08-23

## 2023-09-19 DIAGNOSIS — K21.9 GASTROESOPHAGEAL REFLUX DISEASE: ICD-10-CM

## 2023-09-19 NOTE — TELEPHONE ENCOUNTER
Patient is upset that he received a text message from the pharmacy stating that he only has a 30 day refill on his Rabeprozole medicine and he said he's supposed to get a 90 day prescription so he would like to have the prescription updated today because he is completely out.

## 2023-09-20 RX ORDER — RABEPRAZOLE SODIUM 20 MG/1
20 TABLET, DELAYED RELEASE ORAL DAILY
Qty: 90 TABLET | Refills: 1 | Status: SHIPPED | OUTPATIENT
Start: 2023-09-20

## 2023-10-30 ENCOUNTER — TELEPHONE (OUTPATIENT)
Age: 48
End: 2023-10-30

## 2023-10-30 NOTE — TELEPHONE ENCOUNTER
Please call patient. You can increase the Requip to 2 tablets (1 mg) for the next week or so. Are you taking your iron tablet daily? If not, at least every other day. Please do labs before your schedule physical next month. Your iron might be low again causing more restless leg symptoms.

## 2023-10-30 NOTE — TELEPHONE ENCOUNTER
Pt called in stating that the medication Ropinirole (Requip) 0.5 mg sometimes work for him but more times it hasn't been working for him. Wondering what would Dr. Neyda Sanderson recommended for him to do and would like a call back. Please advise thank you.

## 2023-11-16 ENCOUNTER — APPOINTMENT (OUTPATIENT)
Dept: LAB | Facility: CLINIC | Age: 48
End: 2023-11-16
Payer: COMMERCIAL

## 2023-11-16 DIAGNOSIS — Z00.00 LABORATORY EXAMINATION ORDERED AS PART OF A ROUTINE GENERAL MEDICAL EXAMINATION: ICD-10-CM

## 2023-11-16 DIAGNOSIS — D50.8 IRON DEFICIENCY ANEMIA SECONDARY TO INADEQUATE DIETARY IRON INTAKE: ICD-10-CM

## 2023-11-16 LAB
ALBUMIN SERPL BCP-MCNC: 4.9 G/DL (ref 3.5–5)
ALP SERPL-CCNC: 65 U/L (ref 34–104)
ALT SERPL W P-5'-P-CCNC: 30 U/L (ref 7–52)
ANION GAP SERPL CALCULATED.3IONS-SCNC: 6 MMOL/L
AST SERPL W P-5'-P-CCNC: 23 U/L (ref 13–39)
BASOPHILS # BLD AUTO: 0.03 THOUSANDS/ÂΜL (ref 0–0.1)
BASOPHILS NFR BLD AUTO: 0 % (ref 0–1)
BILIRUB SERPL-MCNC: 0.69 MG/DL (ref 0.2–1)
BUN SERPL-MCNC: 16 MG/DL (ref 5–25)
CALCIUM SERPL-MCNC: 9.8 MG/DL (ref 8.4–10.2)
CHLORIDE SERPL-SCNC: 103 MMOL/L (ref 96–108)
CHOLEST SERPL-MCNC: 170 MG/DL
CO2 SERPL-SCNC: 28 MMOL/L (ref 21–32)
CREAT SERPL-MCNC: 0.77 MG/DL (ref 0.6–1.3)
EOSINOPHIL # BLD AUTO: 0.11 THOUSAND/ÂΜL (ref 0–0.61)
EOSINOPHIL NFR BLD AUTO: 2 % (ref 0–6)
ERYTHROCYTE [DISTWIDTH] IN BLOOD BY AUTOMATED COUNT: 15 % (ref 11.6–15.1)
FERRITIN SERPL-MCNC: 5 NG/ML (ref 24–336)
GFR SERPL CREATININE-BSD FRML MDRD: 107 ML/MIN/1.73SQ M
GLUCOSE P FAST SERPL-MCNC: 92 MG/DL (ref 65–99)
HCT VFR BLD AUTO: 36.9 % (ref 36.5–49.3)
HDLC SERPL-MCNC: 54 MG/DL
HGB BLD-MCNC: 11.5 G/DL (ref 12–17)
IMM GRANULOCYTES # BLD AUTO: 0.03 THOUSAND/UL (ref 0–0.2)
IMM GRANULOCYTES NFR BLD AUTO: 0 % (ref 0–2)
IRON SATN MFR SERPL: 4 % (ref 15–50)
IRON SERPL-MCNC: 28 UG/DL (ref 50–212)
LDLC SERPL CALC-MCNC: 80 MG/DL (ref 0–100)
LYMPHOCYTES # BLD AUTO: 1.67 THOUSANDS/ÂΜL (ref 0.6–4.47)
LYMPHOCYTES NFR BLD AUTO: 23 % (ref 14–44)
MCH RBC QN AUTO: 25.2 PG (ref 26.8–34.3)
MCHC RBC AUTO-ENTMCNC: 31.2 G/DL (ref 31.4–37.4)
MCV RBC AUTO: 81 FL (ref 82–98)
MONOCYTES # BLD AUTO: 0.73 THOUSAND/ÂΜL (ref 0.17–1.22)
MONOCYTES NFR BLD AUTO: 10 % (ref 4–12)
NEUTROPHILS # BLD AUTO: 4.79 THOUSANDS/ÂΜL (ref 1.85–7.62)
NEUTS SEG NFR BLD AUTO: 65 % (ref 43–75)
NONHDLC SERPL-MCNC: 116 MG/DL
NRBC BLD AUTO-RTO: 0 /100 WBCS
PLATELET # BLD AUTO: 373 THOUSANDS/UL (ref 149–390)
PMV BLD AUTO: 9.3 FL (ref 8.9–12.7)
POTASSIUM SERPL-SCNC: 4.7 MMOL/L (ref 3.5–5.3)
PROT SERPL-MCNC: 7.6 G/DL (ref 6.4–8.4)
RBC # BLD AUTO: 4.57 MILLION/UL (ref 3.88–5.62)
SODIUM SERPL-SCNC: 137 MMOL/L (ref 135–147)
TIBC SERPL-MCNC: 796 UG/DL (ref 250–450)
TRIGL SERPL-MCNC: 179 MG/DL
TSH SERPL DL<=0.05 MIU/L-ACNC: 0.84 UIU/ML (ref 0.45–4.5)
UIBC SERPL-MCNC: 768 UG/DL (ref 155–355)
VIT B12 SERPL-MCNC: 444 PG/ML (ref 180–914)
WBC # BLD AUTO: 7.36 THOUSAND/UL (ref 4.31–10.16)

## 2023-11-16 PROCEDURE — 83540 ASSAY OF IRON: CPT

## 2023-11-16 PROCEDURE — 82728 ASSAY OF FERRITIN: CPT

## 2023-11-16 PROCEDURE — 36415 COLL VENOUS BLD VENIPUNCTURE: CPT

## 2023-11-16 PROCEDURE — 83550 IRON BINDING TEST: CPT

## 2023-11-22 ENCOUNTER — OFFICE VISIT (OUTPATIENT)
Dept: INTERNAL MEDICINE CLINIC | Facility: CLINIC | Age: 48
End: 2023-11-22
Payer: COMMERCIAL

## 2023-11-22 VITALS
BODY MASS INDEX: 27.11 KG/M2 | DIASTOLIC BLOOD PRESSURE: 72 MMHG | HEIGHT: 69 IN | SYSTOLIC BLOOD PRESSURE: 118 MMHG | OXYGEN SATURATION: 99 % | HEART RATE: 64 BPM | WEIGHT: 183 LBS | TEMPERATURE: 97.8 F

## 2023-11-22 DIAGNOSIS — E78.2 MIXED HYPERLIPIDEMIA: ICD-10-CM

## 2023-11-22 DIAGNOSIS — Z00.00 HEALTH MAINTENANCE EXAMINATION: Primary | ICD-10-CM

## 2023-11-22 DIAGNOSIS — K21.9 GASTROESOPHAGEAL REFLUX DISEASE, UNSPECIFIED WHETHER ESOPHAGITIS PRESENT: ICD-10-CM

## 2023-11-22 DIAGNOSIS — E66.3 OVERWEIGHT (BMI 25.0-29.9): ICD-10-CM

## 2023-11-22 DIAGNOSIS — G25.81 RESTLESS LEGS SYNDROME: ICD-10-CM

## 2023-11-22 DIAGNOSIS — G89.29 CHRONIC RIGHT SHOULDER PAIN: ICD-10-CM

## 2023-11-22 DIAGNOSIS — K64.8 INTERNAL HEMORRHOIDS: ICD-10-CM

## 2023-11-22 DIAGNOSIS — M25.511 CHRONIC RIGHT SHOULDER PAIN: ICD-10-CM

## 2023-11-22 DIAGNOSIS — D50.8 IRON DEFICIENCY ANEMIA SECONDARY TO INADEQUATE DIETARY IRON INTAKE: ICD-10-CM

## 2023-11-22 DIAGNOSIS — K64.9 BLEEDING HEMORRHOIDS: ICD-10-CM

## 2023-11-22 PROBLEM — E53.8 VITAMIN B12 DEFICIENCY: Status: ACTIVE | Noted: 2023-11-22

## 2023-11-22 PROCEDURE — 99396 PREV VISIT EST AGE 40-64: CPT | Performed by: INTERNAL MEDICINE

## 2023-11-22 RX ORDER — ROPINIROLE 0.5 MG/1
0.5 TABLET, FILM COATED ORAL
Qty: 90 TABLET | Refills: 1 | Status: SHIPPED | OUTPATIENT
Start: 2023-11-22

## 2023-11-22 NOTE — PROGRESS NOTES
Assessment/Plan:    Chronic right shoulder pain  No recent symptoms. Saw Ortho. Reviewed MRI: adhesive capsulitis, impingement and tear. Iron deficiency anemia secondary to inadequate dietary iron intake  He increase iron to bid on his own. Discussed high fiber diet. Restless legs syndrome  Stable, on Requip. Overweight (BMI 25.0-29. 9)  Stable weight. GERD (gastroesophageal reflux disease)  On daily PPI. Vitamin B12 deficiency  May start daily B12. Mixed hyperlipidemia  TG slightly elevated. Reviewed low fat diet. Bleeding hemorrhoids  Ongoing bleeding hemorrhoids. Encouraged to see colo rectal again. He is interested in surgery, as long as it is at the OR and not in the office. Diagnoses and all orders for this visit:    Health maintenance examination  Comments:  Declines flu vaccine. Normal eye exam.    Restless legs syndrome  -     rOPINIRole (REQUIP) 0.5 mg tablet; Take 1 tablet (0.5 mg total) by mouth daily at bedtime    Internal hemorrhoids  -     Ambulatory Referral to Colorectal Surgery; Future    Iron deficiency anemia secondary to inadequate dietary iron intake  -     CBC and differential; Future  -     Iron Panel (Includes Ferritin, Iron Sat%, Iron, and TIBC); Future    Bleeding hemorrhoids    Gastroesophageal reflux disease, unspecified whether esophagitis present    Chronic right shoulder pain    Overweight (BMI 25.0-29. 9)    Mixed hyperlipidemia      Follow up in 1 year or as needed. Subjective:      Patient ID: Buck Oviedo is a 50 y.o. male here for a physical.    He complains of ongoing hemorrhoidal bleed. He reports the past few months, he would bleed 90% of the time after each bowel movement. He reports he would have a lot of blood sometimes taking several times to wipe off. He would move his bowels sometimes up to 3 times a day and would bleed. He is very frustrated about this.   He did not call sooner since he was frustrated with his last visit with the colorectal surgeon. He wanted to have the procedure done but not in the office, wanted it to be at the OR. He is very nervous about this. He started taking iron twice a day since he saw his blood work. His iron and hemoglobin was low. He has been eating more red meat to help his iron. He is concerned about his cholesterol. His sleep would vary. He would sometimes take NyQuil or Tylenol PM to help him have a good night sleep. He would usually sleep at least 6 solid hours a day. His schedule varies at work and this always affects his sleep. The following portions of the patient's history were reviewed and updated as appropriate: allergies, current medications, past medical history, past social history, and problem list.    Review of Systems   Constitutional:  Negative for appetite change and fatigue. HENT:  Negative for congestion, hearing loss and postnasal drip. Eyes: Negative. Respiratory:  Negative for cough, chest tightness and shortness of breath. Cardiovascular:  Negative for chest pain, palpitations and leg swelling. Gastrointestinal:  Positive for blood in stool and rectal pain. Negative for abdominal pain and constipation. Genitourinary:  Negative for dysuria, frequency and urgency. Musculoskeletal:  Negative for arthralgias. Skin:  Negative for rash and wound. Neurological:  Negative for dizziness, numbness and headaches. Hematological:  Negative for adenopathy. Does not bruise/bleed easily. Psychiatric/Behavioral:  Negative for sleep disturbance. The patient is not nervous/anxious. Objective:      /72   Pulse 64   Temp 97.8 °F (36.6 °C)   Ht 5' 9" (1.753 m)   Wt 83 kg (183 lb)   SpO2 99%   BMI 27.02 kg/m²          Physical Exam  Vitals and nursing note reviewed. Constitutional:       Appearance: He is well-developed. HENT:      Head: Normocephalic and atraumatic.       Right Ear: Tympanic membrane, ear canal and external ear normal. Left Ear: Tympanic membrane, ear canal and external ear normal.      Mouth/Throat:      Mouth: Mucous membranes are moist.   Eyes:      Conjunctiva/sclera: Conjunctivae normal.      Pupils: Pupils are equal, round, and reactive to light. Cardiovascular:      Rate and Rhythm: Normal rate and regular rhythm. Heart sounds: Normal heart sounds. Pulmonary:      Effort: Pulmonary effort is normal.      Breath sounds: No wheezing or rhonchi. Abdominal:      General: Bowel sounds are normal.      Palpations: Abdomen is soft. Musculoskeletal:         General: No swelling. Cervical back: Neck supple. Right lower leg: No edema. Left lower leg: No edema. Skin:     General: Skin is warm. Findings: No rash. Neurological:      General: No focal deficit present. Mental Status: He is alert and oriented to person, place, and time. Psychiatric:         Mood and Affect: Mood and affect normal.         Behavior: Behavior normal.             Labs & imaging results reviewed with patient. BMI Counseling: Body mass index is 27.02 kg/m². The BMI is above normal. Nutrition recommendations include 3-5 servings of fruits/vegetables daily, reducing fast food intake, decreasing soda and/or juice intake, and moderation in carbohydrate intake. Exercise recommendations include moderate aerobic physical activity for 150 minutes/week and strength training exercises.

## 2023-11-22 NOTE — ASSESSMENT & PLAN NOTE
Ongoing bleeding hemorrhoids. Encouraged to see colo rectal again. He is interested in surgery, as long as it is at the OR and not in the office.
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
## 2024-01-08 ENCOUNTER — OFFICE VISIT (OUTPATIENT)
Age: 49
End: 2024-01-08
Payer: COMMERCIAL

## 2024-01-08 VITALS — WEIGHT: 184 LBS | HEIGHT: 69 IN | BODY MASS INDEX: 27.25 KG/M2

## 2024-01-08 DIAGNOSIS — K64.8 INTERNAL HEMORRHOIDS: ICD-10-CM

## 2024-01-08 PROCEDURE — 99204 OFFICE O/P NEW MOD 45 MIN: CPT | Performed by: COLON & RECTAL SURGERY

## 2024-01-08 NOTE — PATIENT INSTRUCTIONS
ASSESSMENT:    Israel presents today in consultation, he had been seen prior by Dr. Gaviria and discussed possible transanal hemorrhoidal dearterialization, I performed his colonoscopy nearly 3 years ago, he has not followed up since that time, continues with iron deficiency anemia, iron supplementation, bright red blood per rectum, this is the same complaint as 3 years prior, colonoscopy is current from that time.  Deferred exam due to his poor tolerance of prior exams.  He requests operative intervention as prior discussed, we discussed examination under anesthesia, possible hemorrhoidectomy, possible transanal hemorrhoidal dearterialization/THD, he is not interested in study protocol for standard versus Exparel local anesthetic.  Discussed anorectal surgery and in a face to face, personal informed consent the alternatives,benefits risks including not limited to bleeding, infection, risks of anesthesia, damage to sphincter/incontinence, pain of hemorrhoidal surgeries, recurrence and need for repeat operation, they understood these risks, signed informed consent, and wish to proceed.      PLAN:  Exam under anesthesia/THD possible hemorrhoidectomy to be scheduled on an [place] same-day surgery date  CC:  Dr. Castro

## 2024-01-11 ENCOUNTER — PREP FOR PROCEDURE (OUTPATIENT)
Age: 49
End: 2024-01-11

## 2024-01-11 ENCOUNTER — TELEPHONE (OUTPATIENT)
Age: 49
End: 2024-01-11

## 2024-01-11 DIAGNOSIS — K62.5 RECTAL BLEEDING: Primary | ICD-10-CM

## 2024-01-11 NOTE — TELEPHONE ENCOUNTER
Patient scheduled for surgery  3/5/24  at [facility] OR. Instructions and PAT's gone over with and mailed to the patient.

## 2024-01-11 NOTE — LETTER
Israel Stone  [address]  [place] [address]        1/11/2024    Dear Israel Stone,    Your surgery is scheduled for: March 5, 2024   The hospital will call the evening prior to your surgery with your expected arrival time.   Location:[facility] [address], [place] [address]    CHECK LIST PRIOR TO OUTPATIENT SURGERY  It is your responsibility to obtain any/all referrals needed for your surgery if required by your insurance. Our office will contact you to discuss your insurance coverage for this procedure.     Special instructions required if you are taking any blood thinners. Please verify with the prescribing physician. Examples include Coumadin, Plavix, Xarelto, Eliquis, Pradaxa, etc.     Please check with your family physician if you are taking the following medications: Aspirin or any Aspirin containing medication, Gingko biloba, Ginseng, Feverfew, and/or Willard’s Wort. We suggest stopping these for 3 days.     The night before and the day of your surgery, wash from your neck to groin with chlorhexidine soap. This soap is available at most retail pharmacies under such brand names as Hibiclens, Endure or Aplicare.     Pre-admission testing Required:   NO X      NOTHING TO EAT OR DRINK AFTER MIDNIGHT PRIOR TO SURGERY.     Take ONE FLEET ENEMA one hour prior to leaving for the hospital.     Please do not hesitate to call our office with any questions regarding your surgery.                                  Post-Operative Care Information   Hemorrhoidectomy, EUA, Fissurectomy, Fistulotomy, Sphincterotomy      Resume high fiber diet. Fiber supplementation with Metamucil or Konsyl also recommended daily.       Your first bowel movement may take up to 3 days after surgery. THIS IS OFTEN PAINFUL.      While taking narcotic pain medication, you should remain on an over-the-counter stool softener such as Colace (one tablet twice daily). For constipation Miralax can be  taken up to three times daily.     Pain is to be expected after hemorrhoid surgery. Ibuprofen (400? 600MG) every 6?8 hours is an excellent alternative in addition or as a substitute to your narcotic pain medication.     Rectal bleeding or drainage is normal after surgery.       Nausea is a common complaint post op. This can be associated with the narcotic pain medications, anesthesia as well as with severe constipation.     Driving may be resumed when all off all narcotic pain medications and you can turn or twist your body without hesitation.    If you are unable to urinate within 8 hours after surgery, please call the office at [phone]    Frequent warm tub soaks or warm showers are often soothing, we suggest 3 to 4 times daily.    After bowel movements, you may wash with a hand shower or warm tub soak.  No soap is okay.     No strenuous activity (i.e., Running, jogging, swimming, or weightlifting) for up to one week after surgery.     When will I receive follow-up care?  You should be scheduled for a post-op appointment within 6-8 weeks after surgery, unless otherwise instructed on your discharge summary. If you do not have an existing appointment at the time of discharge, please call our office at [phone].    Call the office at [phone] immediately if you have a fever, chills, excessive sweating, difficulty urinating, or excessive/profuse bleeding with clots.

## 2024-02-20 ENCOUNTER — ANESTHESIA EVENT (OUTPATIENT)
Dept: PERIOP | Facility: AMBULARY SURGERY CENTER | Age: 49
End: 2024-02-20
Payer: COMMERCIAL

## 2024-02-27 ENCOUNTER — TELEPHONE (OUTPATIENT)
Age: 49
End: 2024-02-27

## 2024-02-27 NOTE — PRE-PROCEDURE INSTRUCTIONS
Pre-Surgery Instructions:   Medication Instructions    B Complex-C-E-Zn (Zinc-Vites) TABS Stop taking 7 days prior to surgery.    ferrous sulfate 324 (65 Fe) mg Hold day of surgery.    ibuprofen (MOTRIN) 800 mg tablet Stop taking 7 days prior to surgery.    RABEprazole (ACIPHEX) 20 MG tablet Take day of surgery.    rOPINIRole (REQUIP) 0.5 mg tablet Stop taking 7 days prior to surgery.   Medication instructions for day surgery reviewed. Please use only a sip of water to take your instructed medications. Avoid all over the counter vitamins, supplements and NSAIDS for one week prior to surgery per anesthesia guidelines. Tylenol is ok to take as needed.     You will receive a call one business day prior to surgery with an arrival time and hospital directions. If your surgery is scheduled on a Monday, the hospital will be calling you on the Friday prior to your surgery. If you have not heard from anyone by 8pm, please call the hospital supervisor through the hospital  at [phone]. ([place] [phone] or [place] [phone]).    Do not eat or drink anything after midnight the night before your surgery, including candy, mints, lifesavers, or chewing gum. Do not drink alcohol 24hrs before your surgery. Try not to smoke at least 24hrs before your surgery.       Follow the pre surgery showering instructions as listed in the “My Surgical Experience Booklet” or otherwise provided by your surgeon's office. Do not use a blade to shave the surgical area 1 week before surgery. It is okay to use a clean electric clippers up to 24 hours before surgery. Do not apply any lotions, creams, including makeup, cologne, deodorant, or perfumes after showering on the day of your surgery. Do not use dry shampoo, hair spray, hair gel, or any type of hair products.     No contact lenses, eye make-up, or artificial eyelashes. Remove nail polish, including gel polish, and any artificial, gel, or acrylic nails if possible. Remove  all jewelry including rings and body piercing jewelry.     Wear causal clothing that is easy to take on and off. Consider your type of surgery.    Keep any valuables, jewelry, piercings at home. Please bring any specially ordered equipment (sling, braces) if indicated.    Arrange for a responsible person to drive you to and from the hospital on the day of your surgery. Please confirm the visitor policy for the day of your procedure when you receive your phone call with an arrival time.     Call the surgeon's office with any new illnesses, exposures, or additional questions prior to surgery.    Please reference your “My Surgical Experience Booklet” for additional information to prepare for your upcoming surgery.  Pt. Verbalized an understanding of all instructions reviewed and offers no concerns at this time.Pt. Verbalized an understanding of all instructions reviewed and offers no concerns at this time.

## 2024-02-27 NOTE — TELEPHONE ENCOUNTER
the patient called he had questions regarding his upcoming surgery.  We discussed what he should expect as far as pain and his recover.  Patient was advised to increase his fiber intake a few days prior to surgery.  Patient advised that patients that have a bowel movement with in the first 24 hour post surgery have less pain throughout recovery process.  Patient was also advise to do warm tub soaks and wash with a hand shower after bowel movements.  He should also try to avoid alcohol 24-48 hours after surgery.

## 2024-02-28 ENCOUNTER — OFFICE VISIT (OUTPATIENT)
Dept: OBGYN CLINIC | Facility: CLINIC | Age: 49
End: 2024-02-28
Payer: COMMERCIAL

## 2024-02-28 ENCOUNTER — APPOINTMENT (OUTPATIENT)
Dept: RADIOLOGY | Facility: AMBULARY SURGERY CENTER | Age: 49
End: 2024-02-28
Attending: ORTHOPAEDIC SURGERY
Payer: COMMERCIAL

## 2024-02-28 VITALS — HEIGHT: 69 IN | BODY MASS INDEX: 27.25 KG/M2 | WEIGHT: 184 LBS

## 2024-02-28 DIAGNOSIS — M23.92 INTERNAL DERANGEMENT OF LEFT KNEE: Primary | ICD-10-CM

## 2024-02-28 DIAGNOSIS — M25.562 LEFT KNEE PAIN, UNSPECIFIED CHRONICITY: ICD-10-CM

## 2024-02-28 PROCEDURE — 73562 X-RAY EXAM OF KNEE 3: CPT

## 2024-02-28 PROCEDURE — 99213 OFFICE O/P EST LOW 20 MIN: CPT | Performed by: ORTHOPAEDIC SURGERY
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
## 2024-03-04 NOTE — TELEPHONE ENCOUNTER
Spoke to patient regarding pain following procedure. Patient would like least invasive procedure as possible. He asked if he would be able to have procedure in the office. I explained that since he did not tolerate an exam in the office it would be unlikely that he would tolerate hemorrhoid therapy in the office. We are also unaware how extensive his hemorrhoids are due to lack of exam so it is possible the procedure will not be extensive. I encouraged him to express concerns with Dr. Fisher prior to procedure tomorrow.

## 2024-03-04 NOTE — TELEPHONE ENCOUNTER
Patients GI provider:       Number to return call: ( [phone]    Reason for call:   Pt called with questions about surgery.       Scheduled procedure/appointment date if applicable: Apt/procedure

## 2024-03-05 ENCOUNTER — HOSPITAL ENCOUNTER (OUTPATIENT)
Facility: AMBULARY SURGERY CENTER | Age: 49
Setting detail: OUTPATIENT SURGERY
Discharge: HOME | End: 2024-03-05
Attending: COLON & RECTAL SURGERY | Admitting: COLON & RECTAL SURGERY
Payer: COMMERCIAL

## 2024-03-05 ENCOUNTER — ANESTHESIA (OUTPATIENT)
Dept: PERIOP | Facility: AMBULARY SURGERY CENTER | Age: 49
End: 2024-03-05
Payer: COMMERCIAL

## 2024-03-05 VITALS
WEIGHT: 180 LBS | RESPIRATION RATE: 18 BRPM | OXYGEN SATURATION: 100 % | HEART RATE: 76 BPM | DIASTOLIC BLOOD PRESSURE: 64 MMHG | BODY MASS INDEX: 26.66 KG/M2 | HEIGHT: 69 IN | TEMPERATURE: 97.2 F | SYSTOLIC BLOOD PRESSURE: 131 MMHG

## 2024-03-05 DIAGNOSIS — K64.9 BLEEDING HEMORRHOIDS: Primary | ICD-10-CM

## 2024-03-05 PROBLEM — IMO0001 SMOKING: Status: ACTIVE | Noted: 2024-03-05

## 2024-03-05 PROBLEM — F17.200 SMOKING: Status: ACTIVE | Noted: 2024-03-05

## 2024-03-05 RX ORDER — FENTANYL CITRATE/PF 50 MCG/ML
50 SYRINGE (ML) INJECTION
Status: DISCONTINUED | OUTPATIENT
Start: 2024-03-05 | End: 2024-03-05 | Stop reason: HOSPADM

## 2024-03-05 RX ORDER — PROPOFOL 10 MG/ML
INJECTION, EMULSION INTRAVENOUS AS NEEDED
Status: DISCONTINUED | OUTPATIENT
Start: 2024-03-05 | End: 2024-03-05

## 2024-03-05 RX ORDER — HYDROCODONE BITARTRATE AND ACETAMINOPHEN 5; 325 MG/1; MG/1
1 TABLET ORAL EVERY 6 HOURS PRN
Qty: 15 TABLET | Refills: 0 | Status: SHIPPED | OUTPATIENT
Start: 2024-03-05 | End: 2024-03-15

## 2024-03-05 RX ORDER — MIDAZOLAM HYDROCHLORIDE 2 MG/2ML
INJECTION, SOLUTION INTRAMUSCULAR; INTRAVENOUS AS NEEDED
Status: DISCONTINUED | OUTPATIENT
Start: 2024-03-05 | End: 2024-03-05

## 2024-03-05 RX ORDER — KETAMINE HYDROCHLORIDE 50 MG/ML
INJECTION, SOLUTION INTRAMUSCULAR; INTRAVENOUS AS NEEDED
Status: DISCONTINUED | OUTPATIENT
Start: 2024-03-05 | End: 2024-03-05

## 2024-03-05 RX ORDER — PROMETHAZINE HYDROCHLORIDE 25 MG/ML
25 INJECTION, SOLUTION INTRAMUSCULAR; INTRAVENOUS ONCE AS NEEDED
Status: DISCONTINUED | OUTPATIENT
Start: 2024-03-05 | End: 2024-03-05 | Stop reason: HOSPADM

## 2024-03-05 RX ORDER — GINSENG 100 MG
CAPSULE ORAL AS NEEDED
Status: DISCONTINUED | OUTPATIENT
Start: 2024-03-05 | End: 2024-03-05 | Stop reason: HOSPADM

## 2024-03-05 RX ORDER — MAGNESIUM HYDROXIDE 1200 MG/15ML
LIQUID ORAL AS NEEDED
Status: DISCONTINUED | OUTPATIENT
Start: 2024-03-05 | End: 2024-03-05 | Stop reason: HOSPADM

## 2024-03-05 RX ORDER — ONDANSETRON 2 MG/ML
INJECTION INTRAMUSCULAR; INTRAVENOUS AS NEEDED
Status: DISCONTINUED | OUTPATIENT
Start: 2024-03-05 | End: 2024-03-05

## 2024-03-05 RX ORDER — BUPIVACAINE HYDROCHLORIDE 2.5 MG/ML
INJECTION, SOLUTION EPIDURAL; INFILTRATION; INTRACAUDAL AS NEEDED
Status: DISCONTINUED | OUTPATIENT
Start: 2024-03-05 | End: 2024-03-05 | Stop reason: HOSPADM

## 2024-03-05 RX ORDER — SODIUM CHLORIDE, SODIUM LACTATE, POTASSIUM CHLORIDE, CALCIUM CHLORIDE 600; 310; 30; 20 MG/100ML; MG/100ML; MG/100ML; MG/100ML
INJECTION, SOLUTION INTRAVENOUS CONTINUOUS PRN
Status: DISCONTINUED | OUTPATIENT
Start: 2024-03-05 | End: 2024-03-05

## 2024-03-05 RX ORDER — LIDOCAINE HYDROCHLORIDE 10 MG/ML
INJECTION, SOLUTION EPIDURAL; INFILTRATION; INTRACAUDAL; PERINEURAL AS NEEDED
Status: DISCONTINUED | OUTPATIENT
Start: 2024-03-05 | End: 2024-03-05 | Stop reason: HOSPADM

## 2024-03-05 RX ORDER — FENTANYL CITRATE 50 UG/ML
INJECTION, SOLUTION INTRAMUSCULAR; INTRAVENOUS AS NEEDED
Status: DISCONTINUED | OUTPATIENT
Start: 2024-03-05 | End: 2024-03-05

## 2024-03-05 RX ORDER — PROPOFOL 10 MG/ML
INJECTION, EMULSION INTRAVENOUS CONTINUOUS PRN
Status: DISCONTINUED | OUTPATIENT
Start: 2024-03-05 | End: 2024-03-05

## 2024-03-05 RX ORDER — HYDROCODONE BITARTRATE AND ACETAMINOPHEN 5; 325 MG/1; MG/1
1 TABLET ORAL EVERY 6 HOURS PRN
Status: DISCONTINUED | OUTPATIENT
Start: 2024-03-05 | End: 2024-03-05 | Stop reason: HOSPADM

## 2024-03-05 RX ORDER — ONDANSETRON 2 MG/ML
4 INJECTION INTRAMUSCULAR; INTRAVENOUS ONCE AS NEEDED
Status: DISCONTINUED | OUTPATIENT
Start: 2024-03-05 | End: 2024-03-05 | Stop reason: HOSPADM

## 2024-03-05 RX ORDER — HYDROMORPHONE HCL/PF 1 MG/ML
0.5 SYRINGE (ML) INJECTION
Status: DISCONTINUED | OUTPATIENT
Start: 2024-03-05 | End: 2024-03-05 | Stop reason: HOSPADM

## 2024-03-05 RX ADMIN — KETAMINE HYDROCHLORIDE 10 MG: 50 INJECTION INTRAMUSCULAR; INTRAVENOUS at 15:15

## 2024-03-05 RX ADMIN — FENTANYL CITRATE 25 MCG: 50 INJECTION INTRAMUSCULAR; INTRAVENOUS at 14:44

## 2024-03-05 RX ADMIN — FENTANYL CITRATE 25 MCG: 50 INJECTION INTRAMUSCULAR; INTRAVENOUS at 14:54

## 2024-03-05 RX ADMIN — ONDANSETRON 4 MG: 2 INJECTION INTRAMUSCULAR; INTRAVENOUS at 14:50

## 2024-03-05 RX ADMIN — SODIUM CHLORIDE, SODIUM LACTATE, POTASSIUM CHLORIDE, AND CALCIUM CHLORIDE: .6; .31; .03; .02 INJECTION, SOLUTION INTRAVENOUS at 15:18

## 2024-03-05 RX ADMIN — PROPOFOL 20 MG: 10 INJECTION, EMULSION INTRAVENOUS at 15:00

## 2024-03-05 RX ADMIN — SODIUM CHLORIDE, SODIUM LACTATE, POTASSIUM CHLORIDE, AND CALCIUM CHLORIDE: .6; .31; .03; .02 INJECTION, SOLUTION INTRAVENOUS at 14:22

## 2024-03-05 RX ADMIN — FENTANYL CITRATE 25 MCG: 50 INJECTION INTRAMUSCULAR; INTRAVENOUS at 15:00

## 2024-03-05 RX ADMIN — FENTANYL CITRATE 50 MCG: 50 INJECTION INTRAMUSCULAR; INTRAVENOUS at 15:55

## 2024-03-05 RX ADMIN — HYDROMORPHONE HYDROCHLORIDE 0.5 MG: 1 INJECTION, SOLUTION INTRAMUSCULAR; INTRAVENOUS; SUBCUTANEOUS at 16:08

## 2024-03-05 RX ADMIN — KETAMINE HYDROCHLORIDE 10 MG: 50 INJECTION INTRAMUSCULAR; INTRAVENOUS at 14:55

## 2024-03-05 RX ADMIN — KETAMINE HYDROCHLORIDE 30 MG: 50 INJECTION INTRAMUSCULAR; INTRAVENOUS at 14:44

## 2024-03-05 RX ADMIN — MIDAZOLAM 2 MG: 1 INJECTION INTRAMUSCULAR; INTRAVENOUS at 14:44

## 2024-03-05 RX ADMIN — HYDROMORPHONE HYDROCHLORIDE 0.5 MG: 1 INJECTION, SOLUTION INTRAMUSCULAR; INTRAVENOUS; SUBCUTANEOUS at 16:17

## 2024-03-05 RX ADMIN — PROPOFOL 80 MCG/KG/MIN: 10 INJECTION, EMULSION INTRAVENOUS at 14:44

## 2024-03-05 RX ADMIN — FENTANYL CITRATE 25 MCG: 50 INJECTION INTRAMUSCULAR; INTRAVENOUS at 14:49

## 2024-03-05 RX ADMIN — HYDROCODONE BITARTRATE AND ACETAMINOPHEN 1 TABLET: 5; 325 TABLET ORAL at 17:09

## 2024-03-05 RX ADMIN — HYDROMORPHONE HYDROCHLORIDE 0.5 MG: 1 INJECTION, SOLUTION INTRAMUSCULAR; INTRAVENOUS; SUBCUTANEOUS at 16:01

## 2024-03-05 RX ADMIN — FENTANYL CITRATE 50 MCG: 50 INJECTION INTRAMUSCULAR; INTRAVENOUS at 15:50

## 2024-03-05 NOTE — ANESTHESIA POSTPROCEDURE EVALUATION
Post-Op Assessment Note    CV Status:  Stable    Pain management: adequate       Mental Status:  Lethargic and sleepy   Hydration Status:  Stable   PONV Controlled:  None   Airway Patency:  Patent     Post Op Vitals Reviewed: Yes    No anethesia notable event occurred.    Staff: CRNA, Anesthesiologist             BP      Temp      Pulse     Resp      SpO2

## 2024-03-05 NOTE — DISCHARGE INSTR - AVS FIRST PAGE
May shower/tub bathe this evening.  There may be some bleeding/drainage, normal , may use dry gauze.  Followup in office 6-8weeks Dr. Gaudencio Fisher  Vicodin as prescribed for pain not treated by ibuprofen, do not mix with Tylenol.  High fiber diet with metamucil or konsyl 1 tbsp 1-2x/day, increased hydration noncaffeinated beverages  May use Miralax as needed if no bowel movements in next 24-48hours  Call if any concerns [phone]

## 2024-03-05 NOTE — PROGRESS NOTES
Had an additional extended conversation with Israel, approximately 15 minutes, prior to coming back to OR, repeat discussed that the only thing I can guarantee from this operation is that there will definitely be pain, sometimes urinary difficulties requiring ER visit and Zamorano catheter as well as impaction.    We also discussed that on top of the prior inability to examine him in the office that we have to use our judgment on exam under anesthesia, he is asking for the least invasive operation, I reviewed the THD, transanal hemorrhoidal dearterialization in lengthy detail as the less invasive than traditional dissection hemorrhoidectomy however this will continue to be a painful operation no matter how it is done.    He is hoping by the end of the week he can be sitting, he states preferably at a bar, we did discuss that he understood that pain medications and alcohol cannot be mixed, also that if he does engage in routine alcohol use has stated in his social history the he may be more difficult from a pain control standpoint.  We also discussed that his iron deficiency anemia while likely from this condition given that colonoscopy has been performed prior again we cannot guarantee that this will be the complete solution, indeed his bleeding may even be much improved but not 100% gone.  I offered him cancellation however he definitely wishes to proceed as discussed and was brought to OR.

## 2024-03-05 NOTE — INTERVAL H&P NOTE
Reviewed prior visit with him, he had been seen prior by Dr. Gaviria and discussed possible transanal hemorrhoidal dearterialization, I performed his colonoscopy nearly 3 years ago, he has not followed up since that time, continues with iron deficiency anemia, iron supplementation, bright red blood per rectum, this is the same complaint as 3 years prior, colonoscopy is current from that time.  Deferred exam due to his poor tolerance of prior exams.  He requests operative intervention as prior discussed, we discussed examination under anesthesia, possible hemorrhoidectomy, possible transanal hemorrhoidal dearterialization/THD, he is not interested in study protocol for standard versus Exparel local anesthetic.  Discussed anorectal surgery and in a face to face, personal informed consent the alternatives,benefits risks including not limited to bleeding, infection, risks of anesthesia, damage to sphincter/incontinence, pain of hemorrhoidal surgeries, recurrence and need for repeat operation, they understood these risks, signed informed consent, and wish to proceed.     H&P reviewed. After examining the patient I find no changes in the patients condition since the H&P had been written.    Vitals:    03/05/24 1241   BP: 122/80   Pulse: 66   Resp: 16   Temp: (!) 97.3 °F (36.3 °C)   SpO2: 100%

## 2024-03-05 NOTE — ANESTHESIA PREPROCEDURE EVALUATION
Procedure:  EXAM UNDER ANESTHESIA (EUA) (Anus)  HEMORRHOIDALPEXY (THD) (Anus)  HEMORRHOIDECTOMY EXCISION (Anus)    Relevant Problems   ANESTHESIA (within normal limits)      CARDIO   (+) Bleeding hemorrhoids   (+) Mixed hyperlipidemia   (-) Angina at rest   (-) Angina of effort   (-) Chest pain   (-) AMADOR (dyspnea on exertion)      ENDO   (-) Diabetes mellitus type 1 (HCC)   (-) Diabetes mellitus, type 2 (HCC)      GI/HEPATIC   (+) Bleeding hemorrhoids   (+) GERD (gastroesophageal reflux disease)   (-) Chronic liver disease      /RENAL   (-) Chronic kidney disease      HEMATOLOGY   (+) Iron deficiency anemia secondary to inadequate dietary iron intake      MUSCULOSKELETAL   (+) Chronic right-sided low back pain without sciatica      NEURO/PSYCH   (+) Anxiety   (+) Chronic right shoulder pain   (+) Chronic right-sided low back pain without sciatica   (-) CVA (cerebral vascular accident) (HCC)   (-) Seizures (HCC)      PULMONARY   (+) Smoking   (-) Asthma   (-) Chronic obstructive pulmonary disease (HCC)   (-) Sleep apnea        Physical Exam    Airway    Mallampati score: I  TM Distance: >3 FB  Neck ROM: full     Dental   No notable dental hx     Cardiovascular      Pulmonary      Other Findings        Anesthesia Plan  ASA Score- 2     Anesthesia Type- IV sedation with anesthesia with ASA Monitors.         Additional Monitors:     Airway Plan:            Plan Factors-Exercise tolerance (METS): >4 METS.    Chart reviewed.    Patient summary reviewed.                  Induction- intravenous.    Postoperative Plan-     Informed Consent- Anesthetic plan and risks discussed with patient.  I personally reviewed this patient with the CRNA. Discussed and agreed on the Anesthesia Plan with the CRNA..

## 2024-03-05 NOTE — OP NOTE
OPERATIVE REPORT  PATIENT NAME: Israel Stone    :  1975  MRN: [id number]  Pt Location: [facility] OR ROOM 04    SURGERY DATE: 3/5/2024    Surgeons and Role:     * Gaudencio Fisher MD - Primary    Preop Diagnosis:  Rectal bleeding [K62.5]    Post-Op Diagnosis Codes:     * Rectal bleeding [K62.5]    Procedure(s):  EXAM UNDER ANESTHESIA (EUA)  Anoscopy  HEMORRHOIDALPEXY (THD) x 6 positions    Specimen(s):  * No specimens in log *    Estimated Blood Loss:   Minimal    Drains:  * No LDAs found *    Anesthesia Type:   IV Sedation with Anesthesia    Operative Indications:  Rectal bleeding [K62.5]    Complications:   None    Findings:  -Anoscopy showed grade 2 internal hemorrhoids circumferential, there was no single obvious engorged area suggesting this was a problem of the whole canal.  -THD/hemorrhoidalpexy x6 positions as standard    Procedure and Technique:  Israel presents today for hemorrhoid surgery.  Reviewed prior visit with him, he had been seen prior by Dr. Gaviria and discussed possible transanal hemorrhoidal dearterialization, I performed his colonoscopy nearly 3 years ago, he has not followed up since that time, continues with iron deficiency anemia, iron supplementation, bright red blood per rectum, this is the same complaint as 3 years prior, colonoscopy is current from that time.  Deferred exam due to his poor tolerance of prior exams.  He requests operative intervention as prior discussed, we discussed examination under anesthesia, possible hemorrhoidectomy, possible transanal hemorrhoidal dearterialization/THD, he is not interested in study protocol for standard versus Exparel local anesthetic.    Had an additional extended conversation with Israel, approximately 15 minutes, prior to coming back to OR, repeat discussed that the only thing I can guarantee from this operation is that there will definitely be pain, sometimes urinary difficulties requiring ER visit and Zamorano catheter as well as  impaction.    We also discussed that on top of the prior inability to examine him in the office that we have to use our judgment on exam under anesthesia, he is asking for the least invasive operation, I reviewed the THD, transanal hemorrhoidal dearterialization in lengthy detail as the less invasive than traditional dissection hemorrhoidectomy however this will continue to be a painful operation no matter how it is done.    He is hoping by the end of the week he can be sitting, he states preferably at a bar, we did discuss that he understood that pain medications and alcohol cannot be mixed, also that if he does engage in routine alcohol use has stated in his social history the he may be more difficult from a pain control standpoint.  We also discussed that his iron deficiency anemia while likely from this condition given that colonoscopy has been performed prior again we cannot guarantee that this will be the complete solution, indeed his bleeding may even be much improved but not 100% gone.  I offered him cancellation however he definitely wishes to proceed as discussed and was brought to OR.  Discussed standard anorectal surgery and in a face to face, personal informed consent the alternatives,benefits risks including not limited to bleeding, infection, risks of anesthesia, damage to sphincter/incontinence, pain of hemorrhoidal surgeries, recurrence and need for repeat operation, they understood these risks, signed informed consent, and wish to proceed.    He was brought to the operating room where sedation anesthesia was induced without event. Venodynes were on and running throughout the procedure. He was placed in the prone jackknife position with attention to bony extremities, joints, and genitalia. Arms were placed in the swimmer's view to prevent tension on the shoulders. Buttocks were taped apart and he was Betadine painted. Then he was sterile draped.    After timeout was taken per protocol procedure  began with pudendal and regional nerve block with lidocaine and Marcaine. Digital rectal examination revealed no masses palpated. Anoscopy showed grade 2 internal hemorrhoids circumferential, there was no single obvious engorged area suggesting this was a problem of the whole canal.    The THD device was then placed with Doppler/anoscope. In each of the 6 arterial positions the arterial signal was found and an 0-Vicryl UR 6 suture was used through the operating anoscope to place a figure-of-eight suture in the appropriate slot of the device for consistent depth. Once this was ligated with good decrement of arterial signal the suture was then used to run a mucopexy down towards but avoiding the distal aspect of the dentate line. This was then again tied down to effect hemorrhoidopexy. Once this was complete in all 6 positions repeat anoscopy showed visible decrease in hemorrhoidal engorgement. Hemostasis was assured. 4 x 4's and mesh underwear were placed. All sponge, needle, instruments were correct. Patient was extubated and brought to the recovery room in stable condition.    I was present/scrubbed for the entire procedure.    Patient Disposition:  PACU         SIGNATURE: Gaudencio Fisher MD  DATE: March 5, 2024  TIME: 3:38 PM

## 2024-03-12 ENCOUNTER — HOSPITAL ENCOUNTER (OUTPATIENT)
Dept: MRI IMAGING | Facility: HOSPITAL | Age: 49
Discharge: HOME | End: 2024-03-12
Attending: ORTHOPAEDIC SURGERY
Payer: COMMERCIAL

## 2024-03-12 DIAGNOSIS — M23.92 INTERNAL DERANGEMENT OF LEFT KNEE: ICD-10-CM

## 2024-03-12 DIAGNOSIS — M25.562 LEFT KNEE PAIN, UNSPECIFIED CHRONICITY: ICD-10-CM

## 2024-03-12 PROCEDURE — 73721 MRI JNT OF LWR EXTRE W/O DYE: CPT

## 2024-03-26 ENCOUNTER — OFFICE VISIT (OUTPATIENT)
Dept: OBGYN CLINIC | Facility: CLINIC | Age: 49
End: 2024-03-26
Payer: COMMERCIAL

## 2024-03-26 ENCOUNTER — APPOINTMENT (OUTPATIENT)
Dept: RADIOLOGY | Facility: AMBULARY SURGERY CENTER | Age: 49
End: 2024-03-26
Attending: ORTHOPAEDIC SURGERY
Payer: COMMERCIAL

## 2024-03-26 VITALS — HEIGHT: 69 IN | BODY MASS INDEX: 26.81 KG/M2 | WEIGHT: 181 LBS

## 2024-03-26 DIAGNOSIS — Z01.89 ENCOUNTER FOR LOWER EXTREMITY COMPARISON IMAGING STUDY: ICD-10-CM

## 2024-03-26 DIAGNOSIS — M19.171 POST-TRAUMATIC ARTHRITIS OF RIGHT ANKLE: Primary | ICD-10-CM

## 2024-03-26 DIAGNOSIS — M25.571 PAIN IN RIGHT ANKLE AND JOINTS OF RIGHT FOOT: ICD-10-CM

## 2024-03-26 PROCEDURE — 73600 X-RAY EXAM OF ANKLE: CPT

## 2024-03-26 PROCEDURE — 73610 X-RAY EXAM OF ANKLE: CPT

## 2024-03-26 PROCEDURE — 99214 OFFICE O/P EST MOD 30 MIN: CPT | Performed by: ORTHOPAEDIC SURGERY

## 2024-03-26 NOTE — PATIENT INSTRUCTIONS
Today, We recommended a brace/prosthesis for you. . [facility] certified pedorthotists make orthotics but not braces or prosthesis.  Below are the companies in the area that make these, Please call ahead for an appointment and to ensure the company accepts your insurance. Make sure to bring the prescription given to you in the office today to the company for the brace/prosthesis.    [facility]  [address] [place] [address]  [facility] Phone: [phone]  [place] Fax: [phone]    [address]  [address]  [place] [address]  (By Appointment Only)  Directions  [address]  [address]  [place] [address]  Directions  [place] Phone: [phone]  [place] Fax: [phone]    [facility] Location  [address]  [facility] [address]  [phone] (fax)    [place] Location  [address], [address]  [place] [address]  [phone] (fax)    [place] Location  [address]  [address]  [place] [address] [phone] (fax)    [facility] Location  [address], [address]  [facility] [address]  [phone] (fax)    [place] Location  [address]  [place] [address]  [phone] (fax)    [employer], [employer], [employer] are good brands but I recommend going to a dedicate shoe store (not [employer] or [employer].) At these types of stores, they have experts that can fit you for shoes appropriate for your foot problem. Shoe choice is essential to solving/improving most types of foot pain.  Even after a surgery, good shoes are necessary to keep the foot as comfortable as possible.    [employer]  [address] [place] [address] [place] [address] [phone]    [address], [place] [address]  [phone]    [employer]  [address] [place] [address]  [address]  [phone]    [employer]   [address]  [phone]    [employer]  [address], [place] [address]  [phone]    [employer]  [address] [facility] [facility] [phone]    [facility]   [address] [place] [address]  [phone]    [employer]  [address] [place], [place]  [phone]    [employer] [address], [address]  [phone]    [facility] [address], [place] [address] [phone]      Wear brace for 1 hour per day and increase by 1 hour per day over 2 weeks until you are wearing your brace all day while walking and active. So 2 weeks  after you receive your brace you should be wearing your brace full time. We want to see you back 4 weeks after you have been wearing your brace full time. You can call the office sooner to schedule and injection if the brace is not helping your pain as much as you had hoped.
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
## 2024-04-02 ENCOUNTER — OFFICE VISIT (OUTPATIENT)
Age: 49
End: 2024-04-02

## 2024-04-02 VITALS — HEIGHT: 69 IN | BODY MASS INDEX: 26.81 KG/M2 | WEIGHT: 181 LBS

## 2024-04-02 DIAGNOSIS — K64.9 BLEEDING HEMORRHOIDS: ICD-10-CM

## 2024-04-02 DIAGNOSIS — K64.4 EXTERNAL HEMORRHOIDS: Primary | ICD-10-CM

## 2024-04-02 PROCEDURE — 99024 POSTOP FOLLOW-UP VISIT: CPT | Performed by: COLON & RECTAL SURGERY

## 2024-04-02 NOTE — PATIENT INSTRUCTIONS
ASSESSMENT:    Israel returns today, he is doing quite nicely 1 month from examination under anesthesia, transanal hemorrhoidal dearterialization/mucopexy 3/5/2024.  His bleeding has ceased, he is pleasantly surprised that he had very good pain control and that he is feeling better energy at this time.  External examination shows no abnormality.  Did discuss if he does not continue with increasing hydration and high-fiber diet that this may recur.    PLAN:  High fiber diet/increased hydration, 20-30grams fiber per day, increased fruits/vegetables/psyllium(metamucil or konsyl 1 tbsp 1-2x/day)  Recall colonoscopy/health maintenance 2026  CC:  Ellen Castro MD

## 2024-04-22 ENCOUNTER — TELEPHONE (OUTPATIENT)
Age: 49
End: 2024-04-22

## 2024-04-22 NOTE — TELEPHONE ENCOUNTER
Your restless legs is probably due to your low iron levels.  Are you taking your iron at least every other day? If not, please do so.  I ordered repeat blood work to be done last month, please do this so I know where your iron levels are.    We can increase the Requip to 2 mg every night for now.

## 2024-04-22 NOTE — TELEPHONE ENCOUNTER
Pt called in stating his restless leg syndrome is not getting better, he actually thinks its getting worse. He states as previously discussed with Dr. Castro he double up on his dose of Requip, however its not helping. It is impacting his every day life. Looking for Dr. Castro' recommendation as to what he should do. Unsure if a different medication needs to be prescribed or a different dose.     Please advise, thank you

## 2024-04-23 DIAGNOSIS — G25.81 RESTLESS LEGS SYNDROME: ICD-10-CM

## 2024-04-23 RX ORDER — ROPINIROLE 2 MG/1
2 TABLET, FILM COATED ORAL
Qty: 90 TABLET | Refills: 0 | Status: SHIPPED | OUTPATIENT
Start: 2024-04-23

## 2024-04-23 NOTE — TELEPHONE ENCOUNTER
Spoke with patient   States he got the bleeding from his rectum fixed and now he has no bleeding anywhere, his feet dont get cold anymore.  He stopped taking the iron because he fixed the bleeding problem.    Patient wants to know if 2 mg is going to be too much , he is nervous he will not wake up for work - he states he has doubled up and has been taking 1 mg for about 5  months ( which is double his dose )

## 2024-04-23 NOTE — TELEPHONE ENCOUNTER
Restless leg symptoms commonly caused by low iron.  Even if you do not have the hemorrhoids anymore, your iron is still low causing your leg symptoms.    Up to you if you want to increase the dose of Requip.  Recommend to restart iron every other day please.

## 2024-05-03 ENCOUNTER — HOSPITAL ENCOUNTER (EMERGENCY)
Facility: HOSPITAL | Age: 49
Discharge: HOME | End: 2024-05-03
Attending: EMERGENCY MEDICINE | Admitting: EMERGENCY MEDICINE
Payer: COMMERCIAL

## 2024-05-03 VITALS
RESPIRATION RATE: 18 BRPM | TEMPERATURE: 97.7 F | SYSTOLIC BLOOD PRESSURE: 135 MMHG | OXYGEN SATURATION: 96 % | DIASTOLIC BLOOD PRESSURE: 79 MMHG | HEART RATE: 72 BPM

## 2024-05-03 DIAGNOSIS — R11.0 NAUSEA: ICD-10-CM

## 2024-05-03 DIAGNOSIS — R19.7 DIARRHEA: Primary | ICD-10-CM

## 2024-05-03 LAB
ANION GAP SERPL CALCULATED.3IONS-SCNC: 8 MMOL/L (ref 4–13)
BASOPHILS # BLD AUTO: 0.04 THOUSANDS/ÂΜL (ref 0–0.1)
BASOPHILS NFR BLD AUTO: 1 % (ref 0–1)
BUN SERPL-MCNC: 18 MG/DL (ref 5–25)
CALCIUM SERPL-MCNC: 8.8 MG/DL (ref 8.4–10.2)
CHLORIDE SERPL-SCNC: 105 MMOL/L (ref 96–108)
CK SERPL-CCNC: 230 U/L (ref 39–308)
CO2 SERPL-SCNC: 24 MMOL/L (ref 21–32)
CREAT SERPL-MCNC: 0.7 MG/DL (ref 0.6–1.3)
EOSINOPHIL # BLD AUTO: 0.25 THOUSAND/ÂΜL (ref 0–0.61)
EOSINOPHIL NFR BLD AUTO: 3 % (ref 0–6)
ERYTHROCYTE [DISTWIDTH] IN BLOOD BY AUTOMATED COUNT: 16.1 % (ref 11.6–15.1)
GFR SERPL CREATININE-BSD FRML MDRD: 111 ML/MIN/1.73SQ M
GLUCOSE SERPL-MCNC: 103 MG/DL (ref 65–140)
HCT VFR BLD AUTO: 34.3 % (ref 36.5–49.3)
HGB BLD-MCNC: 10.1 G/DL (ref 12–17)
IMM GRANULOCYTES # BLD AUTO: 0.03 THOUSAND/UL (ref 0–0.2)
IMM GRANULOCYTES NFR BLD AUTO: 0 % (ref 0–2)
LYMPHOCYTES # BLD AUTO: 1.35 THOUSANDS/ÂΜL (ref 0.6–4.47)
LYMPHOCYTES NFR BLD AUTO: 17 % (ref 14–44)
MAGNESIUM SERPL-MCNC: 2 MG/DL (ref 1.9–2.7)
MCH RBC QN AUTO: 20.8 PG (ref 26.8–34.3)
MCHC RBC AUTO-ENTMCNC: 29.4 G/DL (ref 31.4–37.4)
MCV RBC AUTO: 71 FL (ref 82–98)
MONOCYTES # BLD AUTO: 0.9 THOUSAND/ÂΜL (ref 0.17–1.22)
MONOCYTES NFR BLD AUTO: 11 % (ref 4–12)
NEUTROPHILS # BLD AUTO: 5.31 THOUSANDS/ÂΜL (ref 1.85–7.62)
NEUTS SEG NFR BLD AUTO: 68 % (ref 43–75)
NRBC BLD AUTO-RTO: 0 /100 WBCS
PLATELET # BLD AUTO: 358 THOUSANDS/UL (ref 149–390)
PMV BLD AUTO: 9.3 FL (ref 8.9–12.7)
POTASSIUM SERPL-SCNC: 3.8 MMOL/L (ref 3.5–5.3)
RBC # BLD AUTO: 4.86 MILLION/UL (ref 3.88–5.62)
SODIUM SERPL-SCNC: 137 MMOL/L (ref 135–147)
WBC # BLD AUTO: 7.88 THOUSAND/UL (ref 4.31–10.16)

## 2024-05-03 PROCEDURE — 96361 HYDRATE IV INFUSION ADD-ON: CPT

## 2024-05-03 PROCEDURE — 82550 ASSAY OF CK (CPK): CPT

## 2024-05-03 PROCEDURE — 83735 ASSAY OF MAGNESIUM: CPT

## 2024-05-03 PROCEDURE — 85025 COMPLETE CBC W/AUTO DIFF WBC: CPT

## 2024-05-03 PROCEDURE — 80048 BASIC METABOLIC PNL TOTAL CA: CPT

## 2024-05-03 PROCEDURE — 96360 HYDRATION IV INFUSION INIT: CPT

## 2024-05-03 PROCEDURE — 36415 COLL VENOUS BLD VENIPUNCTURE: CPT

## 2024-05-03 PROCEDURE — 99284 EMERGENCY DEPT VISIT MOD MDM: CPT

## 2024-05-03 PROCEDURE — 99284 EMERGENCY DEPT VISIT MOD MDM: CPT | Performed by: EMERGENCY MEDICINE

## 2024-05-03 RX ORDER — ONDANSETRON 4 MG/1
4 TABLET, ORALLY DISINTEGRATING ORAL EVERY 6 HOURS PRN
Qty: 20 TABLET | Refills: 0 | Status: SHIPPED | OUTPATIENT
Start: 2024-05-03

## 2024-05-03 RX ADMIN — SODIUM CHLORIDE 1000 ML: 0.9 INJECTION, SOLUTION INTRAVENOUS at 08:25

## 2024-05-03 NOTE — Clinical Note
Israel Stone was seen and treated in our emergency department on 5/3/2024.                Diagnosis:     Israel  may return to work on return date, is off the rest of the shift today.    He may return on this date: 05/04/2024         If you have any questions or concerns, please don't hesitate to call.      Sherry Miner MD    ______________________________           _______________          _______________  Hospital Representative                              Date                                Time

## 2024-05-03 NOTE — DISCHARGE INSTRUCTIONS
Stay well hydrated by drinking plenty of water or electrolyte solution such as Pedialyte or Gatorade.     Avoid spicy food for the next day or 2. Eat small bland meals until diarrhea resolves.
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
## 2024-05-05 NOTE — ED ATTENDING ATTESTATION
5/3/2024  INolan DO, saw and evaluated the patient. I have discussed the patient with the resident/non-physician practitioner and agree with the resident's/non-physician practitioner's findings, Plan of Care, and MDM as documented in the resident's/non-physician practitioner's note, except where noted. All available labs and Radiology studies were reviewed.  I was present for key portions of any procedure(s) performed by the resident/non-physician practitioner and I was immediately available to provide assistance.       At this point I agree with the current assessment done in the Emergency Department.  I have conducted an independent evaluation of this patient a history and physical is as follows:    49 yo M in the ED for eval of diarrhea and muscle cramps intermittently for the past few days. 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    PE:  The patient is well appearing, non-toxic, in NAD. Head: normocephalic, atraumatic. HEENT: mucous membranes moist.  Lungs: CTA b/l, no resp distress. Heart: RRR. No M/R/G. Abdomen: NT, ND, no R/R/G. Neuro: CN2-12 intact, GCS 15. Normal strength and sensation, normal speech and gait. Cap refill < 2 sec, skin warm and dry. No rashes or lesions.    ED eval: labs incl. Cbc, bmp, CK, mag all normal. Symptoms improved with IV fluids. Stable for discharge home. Work note provided. Dx: mild dehydration due to heat exposure, sweating, diarrhea. No electrolyte derangements or rhabdomyolysis.    ED Course         Critical Care Time  Procedures

## 2024-06-11 DIAGNOSIS — K21.9 GASTROESOPHAGEAL REFLUX DISEASE: ICD-10-CM

## 2024-06-11 DIAGNOSIS — G25.81 RESTLESS LEGS SYNDROME: ICD-10-CM

## 2024-06-12 RX ORDER — RABEPRAZOLE SODIUM 20 MG/1
20 TABLET, DELAYED RELEASE ORAL DAILY
Qty: 90 TABLET | Refills: 0 | Status: SHIPPED | OUTPATIENT
Start: 2024-06-12

## 2024-06-12 RX ORDER — ROPINIROLE 2 MG/1
2 TABLET, FILM COATED ORAL
Qty: 90 TABLET | Refills: 1 | Status: SHIPPED | OUTPATIENT
Start: 2024-06-12

## 2024-07-08 ENCOUNTER — TELEPHONE (OUTPATIENT)
Age: 49
End: 2024-07-08

## 2024-07-08 DIAGNOSIS — D50.9 IRON DEFICIENCY ANEMIA, UNSPECIFIED IRON DEFICIENCY ANEMIA TYPE: ICD-10-CM

## 2024-07-08 DIAGNOSIS — K21.9 GASTROESOPHAGEAL REFLUX DISEASE: ICD-10-CM

## 2024-07-08 RX ORDER — FERROUS SULFATE 324(65)MG
324 TABLET, DELAYED RELEASE (ENTERIC COATED) ORAL
Qty: 180 TABLET | Refills: 1 | Status: SHIPPED | OUTPATIENT
Start: 2024-07-08

## 2024-07-08 RX ORDER — OMEPRAZOLE 40 MG/1
40 CAPSULE, DELAYED RELEASE ORAL DAILY
Qty: 90 CAPSULE | Refills: 0 | Status: SHIPPED | OUTPATIENT
Start: 2024-07-08

## 2024-07-08 NOTE — TELEPHONE ENCOUNTER
Patient called in with a question regarding his Rabeprazole 20 mg . Patient stated he would take one pill and a few hours later have to take another. Patient stated it does work for him but feels like he needs more.  He also mentioned it is only sometimes he feels like he needs more relief as it depends on what he is eating and drinking. Patient asked if there was a higher mg of this medication he could try.  Please advise, thank you

## 2024-07-08 NOTE — TELEPHONE ENCOUNTER
We can change rabeprazole to something else, with a higher dose. Sent to the pharmacy.  You need to limit coffee, carbonated drinks (soda) and acidic foods: tomatoes, oranges, pineapple, onions, spicy foods etc. Also limit milk and airy.

## 2024-07-08 NOTE — TELEPHONE ENCOUNTER
Requested Prescriptions     Pending Prescriptions Disp Refills    ferrous sulfate 324 (65 Fe) mg 180 tablet 0     Sig: Take 1 tablet (324 mg total) by mouth 2 (two) times a day before meals

## 2024-09-03 DIAGNOSIS — K21.9 GASTROESOPHAGEAL REFLUX DISEASE: ICD-10-CM

## 2024-09-04 RX ORDER — OMEPRAZOLE 40 MG/1
40 CAPSULE, DELAYED RELEASE ORAL DAILY
Qty: 90 CAPSULE | Refills: 1 | Status: SHIPPED | OUTPATIENT
Start: 2024-09-04 | End: 2024-09-09 | Stop reason: ALTCHOICE

## 2024-10-31 ENCOUNTER — OFFICE VISIT (OUTPATIENT)
Dept: INTERNAL MEDICINE CLINIC | Facility: CLINIC | Age: 49
End: 2024-10-31
Payer: COMMERCIAL

## 2024-10-31 VITALS
WEIGHT: 188 LBS | DIASTOLIC BLOOD PRESSURE: 88 MMHG | TEMPERATURE: 96.9 F | BODY MASS INDEX: 27.85 KG/M2 | OXYGEN SATURATION: 97 % | SYSTOLIC BLOOD PRESSURE: 138 MMHG | HEART RATE: 100 BPM | HEIGHT: 69 IN

## 2024-10-31 DIAGNOSIS — R19.7 DIARRHEA, UNSPECIFIED TYPE: ICD-10-CM

## 2024-10-31 DIAGNOSIS — J02.9 SORE THROAT: Primary | ICD-10-CM

## 2024-10-31 LAB
S PYO AG THROAT QL: NEGATIVE
SARS-COV-2 AG UPPER RESP QL IA: NEGATIVE
VALID CONTROL: NORMAL

## 2024-10-31 PROCEDURE — 87811 SARS-COV-2 COVID19 W/OPTIC: CPT | Performed by: INTERNAL MEDICINE

## 2024-10-31 PROCEDURE — 99213 OFFICE O/P EST LOW 20 MIN: CPT | Performed by: INTERNAL MEDICINE

## 2024-10-31 PROCEDURE — 87880 STREP A ASSAY W/OPTIC: CPT | Performed by: INTERNAL MEDICINE

## 2024-10-31 RX ORDER — CHLORHEXIDINE GLUCONATE ORAL RINSE 1.2 MG/ML
SOLUTION DENTAL
COMMUNITY
Start: 2024-10-09

## 2024-10-31 RX ORDER — LIDOCAINE HYDROCHLORIDE 20 MG/ML
15 SOLUTION OROPHARYNGEAL 4 TIMES DAILY PRN
Qty: 100 ML | Refills: 0 | Status: SHIPPED | OUTPATIENT
Start: 2024-10-31

## 2024-10-31 NOTE — PROGRESS NOTES
"Ambulatory Visit  Name: Israel Stone      : 1975      MRN: [id number]  Encounter Provider: Ellen Castro MD  Encounter Date: 10/31/2024   Encounter department: [facility] INTERNAL MEDICINE    Assessment & Plan  Sore throat  Rapid strep negative. May take Cepacol lozenges, requested for medication to use prn.  Orders:    POCT rapid ANTIGEN strepA    POCT Rapid Covid Ag    Lidocaine Viscous HCl (XYLOCAINE) 2 % mucosal solution; Swish and spit 15 mL 4 (four) times a day as needed for mouth pain or discomfort    Diarrhea, unspecified type  COVID negative. Reminded to stay hydrated, may drink Gatorade prn. Limit Imodium use.    Orders:    POCT Rapid Covid Ag     Out of work note provided.    History of Present Illness     He started feeling unwell early Monday morning, right before he went to work. He felt a bit tired, no specific symptoms.  The next day, while at work, he reports loose stools twice. He had some urgency, no abdominal pain, nausea or vomiting.  Yesterday, he woke up with a sore throat and did not go to work. He reports having loose stools 3 times. He took some Imodium earlier today.    He reports that his girlfriend has been sick with a cough and cold symptoms, no diarrhea.  She was given antibiotics for it.  Both of them did not check for COVID.        Review of Systems   Constitutional:  Positive for fatigue.   HENT:  Positive for sore throat.    Respiratory:  Positive for cough. Negative for shortness of breath and wheezing.    Cardiovascular:  Negative for chest pain.   Gastrointestinal:  Positive for diarrhea. Negative for nausea and vomiting.   Musculoskeletal:  Positive for arthralgias and myalgias.   Neurological:  Positive for headaches.           Objective     /88   Pulse 100   Temp (!) 96.9 °F (36.1 °C)   Ht 5' 9\" (1.753 m)   Wt 85.3 kg (188 lb)   SpO2 97%   BMI 27.76 kg/m²     Physical Exam  Vitals reviewed.   Constitutional:       Appearance: Normal " appearance.   HENT:      Head: Normocephalic and atraumatic.      Right Ear: Tympanic membrane, ear canal and external ear normal.      Left Ear: Tympanic membrane, ear canal and external ear normal.      Nose: Rhinorrhea present. No congestion.      Mouth/Throat:      Mouth: Mucous membranes are moist.      Pharynx: Posterior oropharyngeal erythema present. No oropharyngeal exudate.   Eyes:      Pupils: Pupils are equal, round, and reactive to light.   Cardiovascular:      Rate and Rhythm: Normal rate and regular rhythm.   Pulmonary:      Effort: Pulmonary effort is normal. No respiratory distress.      Breath sounds: Normal breath sounds.   Abdominal:      Palpations: Abdomen is soft.   Neurological:      General: No focal deficit present.      Mental Status: He is alert and oriented to person, place, and time.   Psychiatric:         Mood and Affect: Mood normal.         Behavior: Behavior normal.

## 2024-11-14 ENCOUNTER — TELEPHONE (OUTPATIENT)
Age: 49
End: 2024-11-14

## 2024-11-14 ENCOUNTER — APPOINTMENT (OUTPATIENT)
Dept: LAB | Facility: CLINIC | Age: 49
End: 2024-11-14
Payer: COMMERCIAL

## 2024-11-14 DIAGNOSIS — Z11.3 SCREEN FOR STD (SEXUALLY TRANSMITTED DISEASE): Primary | ICD-10-CM

## 2024-11-14 DIAGNOSIS — Z11.3 SCREEN FOR STD (SEXUALLY TRANSMITTED DISEASE): ICD-10-CM

## 2024-11-14 DIAGNOSIS — D50.8 IRON DEFICIENCY ANEMIA SECONDARY TO INADEQUATE DIETARY IRON INTAKE: ICD-10-CM

## 2024-11-14 LAB
BASOPHILS # BLD AUTO: 0.03 THOUSANDS/ÂΜL (ref 0–0.1)
BASOPHILS NFR BLD AUTO: 0 % (ref 0–1)
EOSINOPHIL # BLD AUTO: 0.14 THOUSAND/ÂΜL (ref 0–0.61)
EOSINOPHIL NFR BLD AUTO: 2 % (ref 0–6)
ERYTHROCYTE [DISTWIDTH] IN BLOOD BY AUTOMATED COUNT: 15.4 % (ref 11.6–15.1)
FERRITIN SERPL-MCNC: 45 NG/ML (ref 24–336)
HCT VFR BLD AUTO: 43.7 % (ref 36.5–49.3)
HGB BLD-MCNC: 14.2 G/DL (ref 12–17)
IMM GRANULOCYTES # BLD AUTO: 0.08 THOUSAND/UL (ref 0–0.2)
IMM GRANULOCYTES NFR BLD AUTO: 1 % (ref 0–2)
IRON SATN MFR SERPL: 12 % (ref 15–50)
IRON SERPL-MCNC: 51 UG/DL (ref 50–212)
LYMPHOCYTES # BLD AUTO: 1.5 THOUSANDS/ÂΜL (ref 0.6–4.47)
LYMPHOCYTES NFR BLD AUTO: 18 % (ref 14–44)
MCH RBC QN AUTO: 27.4 PG (ref 26.8–34.3)
MCHC RBC AUTO-ENTMCNC: 32.5 G/DL (ref 31.4–37.4)
MCV RBC AUTO: 84 FL (ref 82–98)
MONOCYTES # BLD AUTO: 0.91 THOUSAND/ÂΜL (ref 0.17–1.22)
MONOCYTES NFR BLD AUTO: 11 % (ref 4–12)
NEUTROPHILS # BLD AUTO: 5.87 THOUSANDS/ÂΜL (ref 1.85–7.62)
NEUTS SEG NFR BLD AUTO: 68 % (ref 43–75)
NRBC BLD AUTO-RTO: 0 /100 WBCS
PLATELET # BLD AUTO: 335 THOUSANDS/UL (ref 149–390)
PMV BLD AUTO: 9 FL (ref 8.9–12.7)
RBC # BLD AUTO: 5.18 MILLION/UL (ref 3.88–5.62)
TIBC SERPL-MCNC: 440 UG/DL (ref 250–450)
UIBC SERPL-MCNC: 389 UG/DL (ref 155–355)
WBC # BLD AUTO: 8.53 THOUSAND/UL (ref 4.31–10.16)

## 2024-11-14 PROCEDURE — 87340 HEPATITIS B SURFACE AG IA: CPT

## 2024-11-14 PROCEDURE — 87491 CHLMYD TRACH DNA AMP PROBE: CPT

## 2024-11-14 PROCEDURE — 85025 COMPLETE CBC W/AUTO DIFF WBC: CPT

## 2024-11-14 PROCEDURE — 83550 IRON BINDING TEST: CPT

## 2024-11-14 PROCEDURE — 87591 N.GONORRHOEAE DNA AMP PROB: CPT

## 2024-11-14 PROCEDURE — 36415 COLL VENOUS BLD VENIPUNCTURE: CPT

## 2024-11-14 PROCEDURE — 83540 ASSAY OF IRON: CPT

## 2024-11-14 PROCEDURE — 82728 ASSAY OF FERRITIN: CPT

## 2024-11-14 PROCEDURE — 87389 HIV-1 AG W/HIV-1&-2 AB AG IA: CPT

## 2024-11-14 PROCEDURE — 86803 HEPATITIS C AB TEST: CPT

## 2024-11-15 LAB
C TRACH DNA SPEC QL NAA+PROBE: NEGATIVE
HBV SURFACE AG SER QL: NORMAL
HCV AB SER QL: NORMAL
HIV 1+2 AB+HIV1 P24 AG SERPL QL IA: NORMAL
HIV 2 AB SERPL QL IA: NORMAL
HIV1 AB SERPL QL IA: NORMAL
HIV1 P24 AG SERPL QL IA: NORMAL
N GONORRHOEA DNA SPEC QL NAA+PROBE: NEGATIVE

## 2024-11-18 NOTE — TELEPHONE ENCOUNTER
Called patient- patient does not have any penile discharge and sores. He had relations with someone that tested positive for an STD and wants to be proactive. He will go to the lab now and get those done now.  
Patient called and is concerned he may have an STD. Patient would like to know if he could please get an order to get an STD test done at the lab today. Please call patient back asap.  
Patient was notified.   
Please call patient.  Ask if having any penile discharge or sores.    Labs ordered, give urine and blood.  
Please let him know STD testing was normal.    His anemia has improved, continue taking iron daily.  
no

## 2024-12-05 ENCOUNTER — TELEPHONE (OUTPATIENT)
Age: 49
End: 2024-12-05

## 2024-12-05 DIAGNOSIS — K21.9 GASTROESOPHAGEAL REFLUX DISEASE: ICD-10-CM

## 2024-12-05 NOTE — TELEPHONE ENCOUNTER
Patient called to let Dr. Dao know he prefers to use omeprazole and not the Rabeprazole.    He stated when he is due for a refill he will call for omeprezole.  He is not due yet but just wanted Dr. Castro to be aware.    LIBERTAD

## 2024-12-06 ENCOUNTER — TELEPHONE (OUTPATIENT)
Age: 49
End: 2024-12-06

## 2024-12-06 RX ORDER — OMEPRAZOLE 40 MG/1
40 CAPSULE, DELAYED RELEASE ORAL DAILY
Start: 2024-12-06

## 2024-12-06 NOTE — TELEPHONE ENCOUNTER
Patient called in stating he needs a work note for yesterday and today because he has been sick for a little while now with stuffy nose, head congestion, ears feel full, mucus, coughing. Patient also requesting medication and appointment to be seen. Offered two appointments for this AM and patient declined stating he needs something around 11:30 or after even if its a virtual. Spoke with clerical who advised to send a message back for providers to review. Please advise. Thank you.

## 2024-12-30 DIAGNOSIS — G25.81 RESTLESS LEGS SYNDROME: ICD-10-CM

## 2024-12-30 DIAGNOSIS — K21.9 GASTROESOPHAGEAL REFLUX DISEASE: ICD-10-CM

## 2024-12-30 RX ORDER — ROPINIROLE 2 MG/1
2 TABLET, FILM COATED ORAL
Qty: 90 TABLET | Refills: 1 | Status: SHIPPED | OUTPATIENT
Start: 2024-12-30

## 2024-12-30 RX ORDER — OMEPRAZOLE 40 MG/1
40 CAPSULE, DELAYED RELEASE ORAL DAILY
Qty: 30 CAPSULE | Refills: 0 | Status: SHIPPED | OUTPATIENT
Start: 2024-12-30

## 2024-12-30 NOTE — TELEPHONE ENCOUNTER
Patient states he is out of heartburn medication completely. He is asking for both meds to be sent to pharmacy today please.

## 2025-02-18 DIAGNOSIS — D50.9 IRON DEFICIENCY ANEMIA, UNSPECIFIED IRON DEFICIENCY ANEMIA TYPE: ICD-10-CM

## 2025-02-19 RX ORDER — FERROUS SULFATE 324(65)MG
324 TABLET, DELAYED RELEASE (ENTERIC COATED) ORAL
Qty: 60 TABLET | Refills: 5 | Status: SHIPPED | OUTPATIENT
Start: 2025-02-19

## 2025-06-19 ENCOUNTER — APPOINTMENT (EMERGENCY)
Dept: RADIOLOGY | Facility: HOSPITAL | Age: 50
End: 2025-06-19
Payer: COMMERCIAL

## 2025-06-19 ENCOUNTER — HOSPITAL ENCOUNTER (EMERGENCY)
Facility: HOSPITAL | Age: 50
Discharge: HOME | End: 2025-06-19
Attending: EMERGENCY MEDICINE
Payer: COMMERCIAL

## 2025-06-19 VITALS
TEMPERATURE: 98.3 F | RESPIRATION RATE: 18 BRPM | DIASTOLIC BLOOD PRESSURE: 87 MMHG | OXYGEN SATURATION: 96 % | HEART RATE: 82 BPM | SYSTOLIC BLOOD PRESSURE: 142 MMHG

## 2025-06-19 DIAGNOSIS — M54.9 MUSCULOSKELETAL BACK PAIN: Primary | ICD-10-CM

## 2025-06-19 PROCEDURE — 71046 X-RAY EXAM CHEST 2 VIEWS: CPT

## 2025-06-19 PROCEDURE — 96372 THER/PROPH/DIAG INJ SC/IM: CPT

## 2025-06-19 PROCEDURE — 99283 EMERGENCY DEPT VISIT LOW MDM: CPT

## 2025-06-19 PROCEDURE — 99285 EMERGENCY DEPT VISIT HI MDM: CPT | Performed by: EMERGENCY MEDICINE

## 2025-06-19 RX ORDER — KETOROLAC TROMETHAMINE 30 MG/ML
15 INJECTION, SOLUTION INTRAMUSCULAR; INTRAVENOUS ONCE
Status: COMPLETED | OUTPATIENT
Start: 2025-06-19 | End: 2025-06-19

## 2025-06-19 RX ORDER — LIDOCAINE 50 MG/G
1 PATCH TOPICAL ONCE
Status: DISCONTINUED | OUTPATIENT
Start: 2025-06-19 | End: 2025-06-19 | Stop reason: HOSPADM

## 2025-06-19 RX ORDER — ACETAMINOPHEN 325 MG/1
650 TABLET ORAL ONCE
Status: COMPLETED | OUTPATIENT
Start: 2025-06-19 | End: 2025-06-19

## 2025-06-19 RX ORDER — LIDOCAINE 50 MG/G
1 PATCH TOPICAL DAILY
Qty: 7 PATCH | Refills: 0 | Status: SHIPPED | OUTPATIENT
Start: 2025-06-19 | End: 2025-06-26

## 2025-06-19 RX ADMIN — LIDOCAINE 1 PATCH: 50 PATCH CUTANEOUS at 13:47

## 2025-06-19 RX ADMIN — KETOROLAC TROMETHAMINE 15 MG: 30 INJECTION, SOLUTION INTRAMUSCULAR; INTRAVENOUS at 13:48

## 2025-06-19 RX ADMIN — ACETAMINOPHEN 650 MG: 325 TABLET ORAL at 13:46

## 2025-06-19 NOTE — Clinical Note
Israel Stone was seen and treated in our emergency department on 6/19/2025.                Diagnosis:     Israel  may return to work on return date, is off the rest of the shift today.    He may return on this date: 06/24/2025    If patient is feeling well by 6/23/2025 he is okay to return to work if not return to work 6/24/2025 with activity as tolerated.     If you have any questions or concerns, please don't hesitate to call.      Prabhakar Davenport, DO    ______________________________           _______________          _______________  Hospital Representative                              Date                                Time

## 2025-06-19 NOTE — DISCHARGE INSTRUCTIONS
Please restrain from strenuous activity until symptoms improve.  Apply lidocaine patch to affected area once daily, use ibuprofen 600 mg 3 times daily with meals as needed for pain.
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
## 2025-06-19 NOTE — ED ATTENDING ATTESTATION
6/19/2025  IPrabhakar DO, saw and evaluated the patient. I have discussed the patient with the resident/non-physician practitioner and agree with the resident's/non-physician practitioner's findings, Plan of Care, and MDM as documented in the resident's/non-physician practitioner's note, except where noted. All available labs and Radiology studies were reviewed.  I was present for key portions of any procedure(s) performed by the resident/non-physician practitioner and I was immediately available to provide assistance.       At this point I agree with the current assessment done in the Emergency Department.  I have conducted an independent evaluation of this patient a history and physical is as follows:    49-year-old male presenting to the emergency department with left upper back pain that has been ongoing for the last several days.  States he works at [employer] and lifts heavy boxes/packages and has a lot of repetitive motion, also works out.  Pain is worse with movement and cough or sneezing.  He does not have a cold or URI.  He denies chest pain or shortness of breath.  No trauma, no numbness or tingling or weakness.  No nausea or vomiting.  Has been taking ibuprofen 400 mg with relief however pain comes back, has not rested.    Upon physical examination, patient overall appears well, not in acute distress, heart regular rate and rhythm, lungs are auscultation bilaterally, has point tenderness over the intercostal muscles/musculature chest lateral to the left scapula.  No overlying skin rash.    Per my independent termination of chest x-ray, no acute cardiopulmonary processes    Exam is consistent with musculoskeletal pain.  Provided prescription for lidocaine patches, advise NSAIDs, rest.  Discussed PCP follow-up, return precautions were discussed as well.    ED Course         Critical Care Time  Procedures

## 2025-07-05 DIAGNOSIS — G25.81 RESTLESS LEGS SYNDROME: ICD-10-CM

## 2025-07-07 RX ORDER — ROPINIROLE 2 MG/1
2 TABLET, FILM COATED ORAL
Qty: 90 TABLET | Refills: 1 | OUTPATIENT
Start: 2025-07-07

## 2025-07-20 DIAGNOSIS — G25.81 RESTLESS LEGS SYNDROME: ICD-10-CM

## 2025-07-22 RX ORDER — ROPINIROLE 2 MG/1
2 TABLET, FILM COATED ORAL
Qty: 90 TABLET | Refills: 1 | OUTPATIENT
Start: 2025-07-22

## (undated) DEVICE — LIGHT HANDLE COVER SLEEVE DISP BLUE STELLAR

## (undated) DEVICE — SPONGE STICK WITH PVP-I: Brand: KENDALL

## (undated) DEVICE — PLUMEPEN PRO 10FT

## (undated) DEVICE — LUBRICANT JELLY SURGILUBE TUBE 4OZ FLIP TOP

## (undated) DEVICE — SUT CHROMIC 2-0 SH 27 IN G123H

## (undated) DEVICE — 3000CC GUARDIAN II: Brand: GUARDIAN

## (undated) DEVICE — INTENDED FOR TISSUE SEPARATION, AND OTHER PROCEDURES THAT REQUIRE A SHARP SURGICAL BLADE TO PUNCTURE OR CUT.: Brand: BARD-PARKER SAFETY BLADES SIZE 15, STERILE

## (undated) DEVICE — STERILE LUBRICATING JELLY, TUBE: Brand: HR LUBRICATING JELLY

## (undated) DEVICE — SYRINGE 10ML LL

## (undated) DEVICE — VESSEL LOOPS X-RAY DETECTABLE: Brand: DEROYAL

## (undated) DEVICE — ALL PURPOSE SPONGES,NONWOVEN, 4 PLY: Brand: CURITY

## (undated) DEVICE — BETHLEHEM UNIVERSAL MINOR GEN: Brand: CARDINAL HEALTH

## (undated) DEVICE — MEDI-VAC YANK SUCT HNDL W/TPRD BULBOUS TIP: Brand: CARDINAL HEALTH

## (undated) DEVICE — SCD SEQUENTIAL COMPRESSION COMFORT SLEEVE MEDIUM KNEE LENGTH: Brand: KENDALL SCD

## (undated) DEVICE — DECANTER: Brand: UNBRANDED

## (undated) DEVICE — TUBING SUCTION 5MM X 12 FT

## (undated) DEVICE — NEEDLE 25G X 1 1/2

## (undated) DEVICE — 3M™ DURAPORE™ SURGICAL TAPE 1538-3, 3 INCH X 10 YARD (7,5CM X 9,1M), 4 ROLLS/BOX: Brand: 3M™ DURAPORE™

## (undated) DEVICE — GLOVE INDICATOR PI UNDERGLOVE SZ 8 BLUE

## (undated) DEVICE — FIBER LASER ENT ELEVATE ELITE

## (undated) DEVICE — GLOVE INDICATOR PI UNDERGLOVE SZ 8.5 BLUE

## (undated) DEVICE — THD KIT

## (undated) DEVICE — PAD GROUNDING ADULT

## (undated) DEVICE — IV CATH INTROCAN 18G X 1 1/4 SAFETY

## (undated) DEVICE — BASIC SINGLE BASIN 2-LF: Brand: MEDLINE INDUSTRIES, INC.

## (undated) DEVICE — SUT CHROMIC 3-0 SH 27 IN G122H

## (undated) DEVICE — DISPOSABLE BRIEF/UNDERWEAR

## (undated) DEVICE — SYRINGE 10ML LL CONTROL TOP

## (undated) DEVICE — NEEDLE 25GA X 1 IN SAFETY GLIDE

## (undated) DEVICE — GLOVE SRG BIOGEL 8

## (undated) DEVICE — VIAL DECANTER

## (undated) DEVICE — GLOVE SRG BIOGEL ECLIPSE 7.5

## (undated) DEVICE — BETHLEHEM UNIVERSAL OUTPATIENT: Brand: CARDINAL HEALTH

## (undated) DEVICE — ELECTRODE NEEDLE MOD E-Z CLEAN 2.75IN 7CM -0013M

## (undated) DEVICE — SYRINGE 20ML LL

## (undated) DEVICE — GAUZE SPONGES,16 PLY: Brand: CURITY